# Patient Record
Sex: MALE | Race: ASIAN | NOT HISPANIC OR LATINO | Employment: OTHER | URBAN - METROPOLITAN AREA
[De-identification: names, ages, dates, MRNs, and addresses within clinical notes are randomized per-mention and may not be internally consistent; named-entity substitution may affect disease eponyms.]

---

## 2017-01-05 ENCOUNTER — TELEPHONE (OUTPATIENT)
Dept: CARDIOLOGY | Facility: MEDICAL CENTER | Age: 67
End: 2017-01-05

## 2017-01-05 NOTE — TELEPHONE ENCOUNTER
----- Message from Malinda Lewis sent at 1/5/2017  8:42 AM PST -----  Regarding: patient needs samples of Eliquis  CR/Zoe      Patient only has 2 doses of Eliquis left. He can't get refill because he doesn't have his new insurance card yet. He wants to know if he can  samples. He can be reached at [phone]

## 2017-01-06 ENCOUNTER — OFFICE VISIT (OUTPATIENT)
Dept: URGENT CARE | Facility: PHYSICIAN GROUP | Age: 67
End: 2017-01-06
Payer: COMMERCIAL

## 2017-01-06 VITALS
TEMPERATURE: 98.9 F | RESPIRATION RATE: 16 BRPM | HEIGHT: 71 IN | HEART RATE: 74 BPM | SYSTOLIC BLOOD PRESSURE: 130 MMHG | BODY MASS INDEX: 30.1 KG/M2 | WEIGHT: 215 LBS | OXYGEN SATURATION: 93 % | DIASTOLIC BLOOD PRESSURE: 84 MMHG

## 2017-01-06 DIAGNOSIS — S93.401A SPRAIN OF RIGHT ANKLE, UNSPECIFIED LIGAMENT, INITIAL ENCOUNTER: ICD-10-CM

## 2017-01-06 DIAGNOSIS — M72.2 PLANTAR FASCIITIS OF RIGHT FOOT: Primary | ICD-10-CM

## 2017-01-06 PROCEDURE — 99214 OFFICE O/P EST MOD 30 MIN: CPT | Performed by: PHYSICIAN ASSISTANT

## 2017-01-06 RX ORDER — METHYLPREDNISOLONE 4 MG/1
4 TABLET ORAL DAILY
Qty: 1 KIT | Refills: 0 | Status: SHIPPED | OUTPATIENT
Start: 2017-01-06 | End: 2019-03-16

## 2017-01-06 ASSESSMENT — ENCOUNTER SYMPTOMS
GASTROINTESTINAL NEGATIVE: 1
MYALGIAS: 1
CARDIOVASCULAR NEGATIVE: 1
EYES NEGATIVE: 1
CONSTITUTIONAL NEGATIVE: 1
RESPIRATORY NEGATIVE: 1
TINGLING: 0
DIZZINESS: 0
FALLS: 0
PSYCHIATRIC NEGATIVE: 1
BRUISES/BLEEDS EASILY: 0

## 2017-01-06 NOTE — MR AVS SNAPSHOT
"        Kip Harper   2017 10:45 AM   Office Visit   MRN: [id number]    Department:  [facility]   Dept Phone:  [phone]    Description:  Male : 1950   Provider:  Harpal Cintron PA-C           Reason for Visit     Ankle Injury stepped wrong, R ankle pain x3 days      Allergies as of 2017     Allergen Noted Reactions    No Known Drug Allergy 2012         You were diagnosed with     Plantar fasciitis of right foot   [535563]  -  Primary     Sprain of right ankle, unspecified ligament, initial encounter   [[id number]]         Vital Signs     Blood Pressure Pulse Temperature Respirations Height Weight    130/84 mmHg 74 37.2 °C (98.9 °F) 16 1.803 m (5' 10.98\") 97.523 kg (215 lb)    Body Mass Index Oxygen Saturation Smoking Status             30.00 kg/m2 93% Never Smoker          Basic Information     Date Of Birth Sex Race Ethnicity Preferred Language    1950 Male  or   Origin (Romansh,Sao Tomean,Cypriot,V[place]an, etc) English      Problem List              ICD-10-CM Priority Class Noted - Resolved    Hyperlipidemia E78.5 High  Unknown - Present    Hypertensive heart disease  High  Unknown - Present    Obesity E66.9   Unknown - Present    Paroxysmal atrial fibrillation (HCC) I48.0 High  Unknown - Present    Sleep apnea G47.30   Unknown - Present    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 R76.11   Unknown - Present    GOUT  High  3/15/2012 - Present    Vision loss of right eye H54.61   2014 - Present    Atrial fibrillation (HCC) I48.91   2014 - Present    Essential hypertension, benign I10 Medium  2014 - Present    Mitral regurgitation I34.0 Medium  2014 - Present    Chronic anticoagulation Z79.01   2014 - Present    Gout M10.9   2014 - Present    Melena K92.1   2016 - Present    Syncope R55 High  2016 - Present    Anemia D64.9 High  8/10/2016 - Present      Health Maintenance        Date Due Completion Dates    IMM DTaP/Tdap/Td " Vaccine (1 - Tdap) 4/7/1968 ---    COLONOSCOPY 4/7/1999 ---    IMM ZOSTER VACCINE 4/7/2009 ---    IMM PNEUMOCOCCAL 65+ (ADULT) LOW/MEDIUM RISK SERIES (1 of 2 - PCV13) 4/7/2014 ---    IMM INFLUENZA (1) 9/1/2016 ---            Current Immunizations     No immunizations on file.      Below and/or attached are the medications your provider expects you to take. Review all of your home medications and newly ordered medications with your provider and/or pharmacist. Follow medication instructions as directed by your provider and/or pharmacist. Please keep your medication list with you and share with your provider. Update the information when medications are discontinued, doses are changed, or new medications (including over-the-counter products) are added; and carry medication information at all times in the event of emergency situations     Allergies:  NO KNOWN DRUG ALLERGY - (reactions not documented)               Medications  Valid as of: January 06, 2017 - 11:41 AM    Generic Name Brand Name Tablet Size Instructions for use    Apixaban (Tab) ELIQUIS 5mg Take 1 Tab by mouth 2 Times a Day.        Colchicine (Tab) COLCRYS 0.6 MG TAKE 2 TABS AT SIGNS OF GOUT ATTACK, THEN 1 TAB ONE HOUR AFTER FIRST DOSE. DO NOT REPEAT EARLIER THAN 3 DAYS.        Hydrocodone-Acetaminophen (Tab) NORCO 5-325 MG 1 TAB EVERY 6 HOURS ONLY IF NEEDED FOR PAIN. MAY CAUSE DROWSINESS.        MethylPREDNISolone (Tablet Therapy Pack) MEDROL DOSEPAK 4 MG TAKE AS DIRECTED ON PACKAGE.        MethylPREDNISolone (Tablet Therapy Pack) MEDROL DOSEPAK 4 MG Take 1 Tab by mouth every day.        Metoprolol Tartrate (Tab) LOPRESSOR 25 MG Take 1 Tab by mouth 2 Times a Day.        Pantoprazole Sodium (Tablet Delayed Response) PROTONIX 40 MG Take 1 Tab by mouth 2 times a day.        Potassium Chloride Oksana CR (Tab CR) K-DUR 20 MEQ Take 2 Tabs by mouth every day.        Pravastatin Sodium (Tab) PRAVACHOL 40 MG Take 40 mg by mouth every evening.        Pravastatin  Sodium (Tab) PRAVACHOL 40 MG TAKE 1 TABLET BY MOUTH IN THE EVENING        Verapamil HCl (Tab CR) CALAN- MG Take 180 mg by mouth every evening.        .                 Medicines prescribed today were sent to:     [id number] - [place], [place] - [address]    [address] [place] [address]    Phone: [phone] Fax: [phone]    Open 24 Hours?: No      Medication refill instructions:       If your prescription bottle indicates you have medication refills left, it is not necessary to call your provider’s office. Please contact your pharmacy and they will refill your medication.    If your prescription bottle indicates you do not have any refills left, you may request refills at any time through one of the following ways: The online [employer] system (except Urgent Care), by calling your provider’s office, or by asking your pharmacy to contact your provider’s office with a refill request. Medication refills are processed only during regular business hours and may not be available until the next business day. Your provider may request additional information or to have a follow-up visit with you prior to refilling your medication.   *Please Note: Medication refills are assigned a new Rx number when refilled electronically. Your pharmacy may indicate that no refills were authorized even though a new prescription for the same medication is available at the pharmacy. Please request the medicine by name with the pharmacy before contacting your provider for a refill.        Your To Do List     Future Labs/Procedures Complete By Expires    DX-ANKLE 3+ VIEWS RIGHT  As directed 1/12/2018      Referral     A referral request has been sent to our patient care coordination department. Please allow 3-5 business days for us to process this request and contact you either by phone or mail. If you do not hear from us by the 5th business day, please call us at [phone].        Instructions    Medrol  dosepak.  Keep foot wrapped.  Work on stretches and exercises.  Icing.  Elevated above the heart.  Physical Therapy to help aid recovery.    Plantar Fasciitis  Plantar fasciitis is a painful foot condition that affects the heel. It occurs when the band of tissue that connects the toes to the heel bone (plantar fascia) becomes irritated. This can happen after exercising too much or doing other repetitive activities (overuse injury). The pain from plantar fasciitis can range from mild irritation to severe pain that makes it difficult for you to walk or move. The pain is usually worse in the morning or after you have been sitting or lying down for a while.  CAUSES  This condition may be caused by:  · Standing for long periods of time.  · Wearing shoes that do not fit.  · Doing high-impact activities, including running, aerobics, and ballet.  · Being overweight.  · Having an abnormal way of walking (gait).  · Having tight calf muscles.  · Having high arches in your feet.  · Starting a new athletic activity.  SYMPTOMS  The main symptom of this condition is heel pain. Other symptoms include:  · Pain that gets worse after activity or exercise.  · Pain that is worse in the morning or after resting.  · Pain that goes away after you walk for a few minutes.  DIAGNOSIS  This condition may be diagnosed based on your signs and symptoms. Your health care provider will also do a physical exam to check for:  · A tender area on the bottom of your foot.  · A high arch in your foot.  · Pain when you move your foot.  · Difficulty moving your foot.  You may also need to have imaging studies to confirm the diagnosis. These can include:  · X-rays.  · Ultrasound.  · MRI.  TREATMENT   Treatment for plantar fasciitis depends on the severity of the condition. Your treatment may include:  · Rest, ice, and over-the-counter pain medicines to manage your pain.  · Exercises to stretch your calves and your plantar fascia.  · A splint that holds your  foot in a stretched, upward position while you sleep (night splint).  · Physical therapy to relieve symptoms and prevent problems in the future.  · Cortisone injections to relieve severe pain.  · Extracorporeal shockwave therapy (ESWT) to stimulate damaged plantar fascia with electrical impulses. It is often used as a last resort before surgery.  · Surgery, if other treatments have not worked after 12 months.  HOME CARE INSTRUCTIONS  · Take medicines only as directed by your health care provider.  · Avoid activities that cause pain.  · Roll the bottom of your foot over a bag of ice or a bottle of cold water. Do this for 20 minutes, 3-4 times a day.  · Perform simple stretches as directed by your health care provider.  · Try wearing athletic shoes with air-sole or gel-sole cushions or soft shoe inserts.  · Wear a night splint while sleeping, if directed by your health care provider.  · Keep all follow-up appointments with your health care provider.  PREVENTION   · Do not perform exercises or activities that cause heel pain.  · Consider finding low-impact activities if you continue to have problems.  · Lose weight if you need to.  The best way to prevent plantar fasciitis is to avoid the activities that aggravate your plantar fascia.  SEEK MEDICAL CARE IF:  · Your symptoms do not go away after treatment with home care measures.  · Your pain gets worse.  · Your pain affects your ability to move or do your daily activities.     This information is not intended to replace advice given to you by your health care provider. Make sure you discuss any questions you have with your health care provider.     Document Released: 09/12/2002 Document Revised: 05/03/2016 Document Reviewed: 10/28/2015  [employer] Interactive Patient Education ©2016 Elsevier Inc.            HauteDay Access Code: [email or web address]  Expires: 2/5/2017 11:28 AM    [employer]  A secure, online tool to manage your health information     Monitor My Medss [employer] is  a secure, online tool that connects you to your personalized health information from the privacy of your home -- day or night - making it very easy for you to manage your healthcare. Once the activation process is completed, you can even access your medical information using the [employer] Education cr, which is available for free in the Apple Cr store or Google Play store.     Or[employer] Education provides the following levels of access (as shown below):   My Chart Features   [facility] Doctor [facility]  Specialists [facility]  Urgent  Care Non-[facility]  Primary Care  Doctor   Email your healthcare team securely and privately 24/7 X X X    Manage appointments: schedule your next appointment; view details of past/upcoming appointments X      Request prescription refills. X      View recent personal medical records, including lab and immunizations X X X X   View health record, including health history, allergies, medications X X X X   Read reports about your outpatient visits, procedures, consult and ER notes X X X X   See your discharge summary, which is a recap of your hospital and/or ER visit that includes your diagnosis, lab results, and care plan. X X       How to register for [employer] Education:  1. Go to  [email or web address].  2. Click on the Sign Up Now box, which takes you to the New Member Sign Up page. You will need to provide the following information:  a. Enter your Or[employer] Education Access Code exactly as it appears at the top of this page. (You will not need to use this code after you’ve completed the sign-up process. If you do not sign up before the expiration date, you must request a new code.)   b. Enter your date of birth.   c. Enter your home email address.   d. Click Submit, and follow the next screen’s instructions.  3. Create a General Fusiont ID. This will be your Orbis Education login ID and cannot be changed, so think of one that is secure and easy to remember.  4. Create a Or[employer] Education password. You can change your password at any time.  5. Enter  your Password Reset Question and Answer. This can be used at a later time if you forget your password.   6. Enter your e-mail address. This allows you to receive e-mail notifications when new information is available in [employer].  7. Click Sign Up. You can now view your health information.    For assistance activating your [employer] account, call [phone]

## 2017-01-06 NOTE — PROGRESS NOTES
Subjective:      Kip Harper is a 66 y.o. male who presents with Ankle Injury            Ankle Injury   Pertinent negatives include no tingling.     Chief Complaint   Patient presents with   • Ankle Injury     stepped wrong, R ankle pain x3 days       HPI:  Kip Harper is a 66 y.o.male who presents with right ankle pain x 3 days.  No falls.  Patient believes he stepped wrong and inverted the ankle.  Pain on hte inside of the plantar region as well ×1 month.  Went last month to ortho and was dx with plantar fascitis.  X-ray showed spur and tendonitis.  No numbness or paresthesias. Patient has been doing exercises that were demonstrated to him upon wakening, plantar flexion and extension. He did try icing last night. Was unable to tolerate ibuprofen. Did receive a Medrol Dosepak a month ago which did seem to help with symptoms. He does have history of gout involving the toes, mostly left foot. Patient denies HA, SOB, chest pain, palpitations, fever, chills, or n/v/d.      Past Medical History   Diagnosis Date   • Hyperlipidemia    • Hypertensive heart disease    • Hypertensive heart disease    • Obesity    • Paroxysmal atrial fibrillation    • Sleep apnea    • 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        Past Surgical History   Procedure Laterality Date   • Gastroscopy-endo  8/11/2016     Procedure: GASTROSCOPY-ENDO;  Surgeon: Jones Moyer M.D.;  Location: [facility];  Service:        Family History   Problem Relation Age of Onset   • Heart Disease Mother    • Diabetes Mother    • Heart Disease Father    • Lung Disease Father        Social History     Social History   • Marital Status:      Spouse Name: N/A   • Number of Children: N/A   • Years of Education: N/A     Occupational History   • Not on file.     Social History Main Topics   • Smoking status: Never Smoker    • Smokeless tobacco: Never Used   • Alcohol Use: No   • Drug Use: No   • Sexual Activity: Not on file     Other Topics  "Concern   • Not on file     Social History Narrative         Current outpatient prescriptions:   •  apixaban, 5 mg, Oral, BID, 1/6/2017  •  metoprolol, 25 mg, Oral, BID, 1/6/2017  •  pravastatin, 40 mg, Oral, Nightly, 1/6/2017 at pm  •  verapamil ER, 180 mg, Oral, Q EVENING, 1/6/2017 at pm  •  pravastatin, TAKE 1 TABLET BY MOUTH IN THE EVENING  •  MethylPREDNISolone, TAKE AS DIRECTED ON PACKAGE.  •  colchicine, TAKE 2 TABS AT SIGNS OF GOUT ATTACK, THEN 1 TAB ONE HOUR AFTER FIRST DOSE. DO NOT REPEAT EARLIER THAN 3 DAYS.  •  hydrocodone-acetaminophen, 1 TAB EVERY 6 HOURS ONLY IF NEEDED FOR PAIN. MAY CAUSE DROWSINESS.  •  potassium chloride SA, 40 mEq, Oral, DAILY  •  pantoprazole, 40 mg, Oral, BID, 10/9/2016    Allergies   Allergen Reactions   • No Known Drug Allergy              Review of Systems   Constitutional: Negative.    HENT: Negative.    Eyes: Negative.    Respiratory: Negative.    Cardiovascular: Negative.    Gastrointestinal: Negative.    Genitourinary: Negative.    Musculoskeletal: Positive for myalgias and joint pain. Negative for falls.   Skin: Negative.    Neurological: Negative for dizziness and tingling.   Endo/Heme/Allergies: Does not bruise/bleed easily.   Psychiatric/Behavioral: Negative.           Objective:     /84 mmHg  Pulse 74  Temp(Src) 37.2 °C (98.9 °F)  Resp 16  Ht 1.803 m (5' 10.98\")  Wt 97.523 kg (215 lb)  BMI 30.00 kg/m2  SpO2 93%     Physical Exam       Nursing note and vitals reviewed.    Constitutional:  Appropriately groomed, pleasant affect, well nourished, and in no acute distress.    HEENT:  Head: Atraumatic, normocephalic.    Ears:  Hearing grossly intact to voice.    Eyes:  Conjunctivae clear, sclera white, and medial canthus without exudate bilaterally.    Lungs:  Lungs with normal respiratory excursion and effort.      Right Ankle:  No edema, erythema, or ecchymosis present.  Ttp across the tarsals over the retinaculum.  Ttp over ATFL, deltoid ligaments, posterior " lat malleoulus. Point tenderness over the plantar fascia. No pain over the Achilles tendon.     FROM: Dorsiflexion, plantar flexion, inversion, eversion.      Motor Examination: Dorsi/plantar flexion 5/5 without atrophy.  Negative Washington's.      Negative Anterior/Posterior drawer.   Pain with resisted hallicus dorsiflexion.    Sensation equal bilaterally to light touch for L4, L5, and S1.      DTR 2+ for Achilles and patella bilaterally.  NVS intact, DP 2+ bilaterally.  Gait and station wnl,  antalgic using cane for ambulation.    Derm:  No rashes or lesions with good turgor pressure.     Psychiatric:  Normal judgement, mood and affect.           Assessment/Plan:     1. Plantar fasciitis of right foot  Patient presents with right plantar fasciitis diagnosed by a orthopedic clinic last month. On exam patient has point tenderness over the plantar fascia. Did discuss x-ray for further evaluation. Patient states he had an x-ray done at the other facility which did show some tendinitis and spurring. Given this, suspect calcific tendinitis. Placed patient in an Ace bandage and recommended icing and range of motion exercises. Did Rx Medrol Dosepak for steroid trial this patient does have a history of gout. Low suspicion for gout at this time, but do expect some improvement with steroidal anti-inflammatories. Did refer patient to physical therapy for further evaluation and treatment.    - REFERRAL TO PHYSICAL THERAPY Reason for Therapy: Eval/Treat/Report  - DX-ANKLE 3+ VIEWS RIGHT; Future  - MethylPREDNISolone (MEDROL DOSEPAK) 4 MG Tablet Therapy Pack; Take 1 Tab by mouth every day.  Dispense: 1 Kit; Refill: 0    2. Sprain of right ankle, unspecified ligament, initial encounter   Patient was suspected sprain to right ankle with some ATFL tenderness to palpation. Patient does have moderate posterior fibular tenderness to palpation and given this concern for ankle fracture. Did discuss x-ray with patient. X-ray capabilities  aren't available to site and did provide patient with outpatient order to be done at [place] today. Recommended rice protocol. Patient using a cane for ambulation and did recommend continuing with this to help with managing pain and ankle instability.    Patient was in agreement with this treatment plan and seemed to understand without barriers. All questions were encouraged and answered.  Reviewed signs and symptoms of when to seek emergency medical care.     Please note that this dictation was created using voice recognition software.  I have made every reasonable attempt to correct obvious errors, but I expect there are errors of sosa and possibly content that I did not discover before finalizing the note.     - REFERRAL TO PHYSICAL THERAPY Reason for Therapy: Eval/Treat/Report  - DX-ANKLE 3+ VIEWS RIGHT; Future  - MethylPREDNISolone (MEDROL DOSEPAK) 4 MG Tablet Therapy Pack; Take 1 Tab by mouth every day.  Dispense: 1 Kit; Refill: 0

## 2017-01-06 NOTE — PATIENT INSTRUCTIONS
Medrol dosepak.  Keep foot wrapped.  Work on stretches and exercises.  Icing.  Elevated above the heart.  Physical Therapy to help aid recovery.    Plantar Fasciitis  Plantar fasciitis is a painful foot condition that affects the heel. It occurs when the band of tissue that connects the toes to the heel bone (plantar fascia) becomes irritated. This can happen after exercising too much or doing other repetitive activities (overuse injury). The pain from plantar fasciitis can range from mild irritation to severe pain that makes it difficult for you to walk or move. The pain is usually worse in the morning or after you have been sitting or lying down for a while.  CAUSES  This condition may be caused by:  · Standing for long periods of time.  · Wearing shoes that do not fit.  · Doing high-impact activities, including running, aerobics, and ballet.  · Being overweight.  · Having an abnormal way of walking (gait).  · Having tight calf muscles.  · Having high arches in your feet.  · Starting a new athletic activity.  SYMPTOMS  The main symptom of this condition is heel pain. Other symptoms include:  · Pain that gets worse after activity or exercise.  · Pain that is worse in the morning or after resting.  · Pain that goes away after you walk for a few minutes.  DIAGNOSIS  This condition may be diagnosed based on your signs and symptoms. Your health care provider will also do a physical exam to check for:  · A tender area on the bottom of your foot.  · A high arch in your foot.  · Pain when you move your foot.  · Difficulty moving your foot.  You may also need to have imaging studies to confirm the diagnosis. These can include:  · X-rays.  · Ultrasound.  · MRI.  TREATMENT   Treatment for plantar fasciitis depends on the severity of the condition. Your treatment may include:  · Rest, ice, and over-the-counter pain medicines to manage your pain.  · Exercises to stretch your calves and your plantar fascia.  · A splint that  holds your foot in a stretched, upward position while you sleep (night splint).  · Physical therapy to relieve symptoms and prevent problems in the future.  · Cortisone injections to relieve severe pain.  · Extracorporeal shockwave therapy (ESWT) to stimulate damaged plantar fascia with electrical impulses. It is often used as a last resort before surgery.  · Surgery, if other treatments have not worked after 12 months.  HOME CARE INSTRUCTIONS  · Take medicines only as directed by your health care provider.  · Avoid activities that cause pain.  · Roll the bottom of your foot over a bag of ice or a bottle of cold water. Do this for 20 minutes, 3-4 times a day.  · Perform simple stretches as directed by your health care provider.  · Try wearing athletic shoes with air-sole or gel-sole cushions or soft shoe inserts.  · Wear a night splint while sleeping, if directed by your health care provider.  · Keep all follow-up appointments with your health care provider.  PREVENTION   · Do not perform exercises or activities that cause heel pain.  · Consider finding low-impact activities if you continue to have problems.  · Lose weight if you need to.  The best way to prevent plantar fasciitis is to avoid the activities that aggravate your plantar fascia.  SEEK MEDICAL CARE IF:  · Your symptoms do not go away after treatment with home care measures.  · Your pain gets worse.  · Your pain affects your ability to move or do your daily activities.     This information is not intended to replace advice given to you by your health care provider. Make sure you discuss any questions you have with your health care provider.     Document Released: 09/12/2002 Document Revised: 05/03/2016 Document Reviewed: 10/28/2015  [employer] Interactive Patient Education ©2016 [employer]

## 2017-01-11 ENCOUNTER — OFFICE VISIT (OUTPATIENT)
Dept: URGENT CARE | Facility: PHYSICIAN GROUP | Age: 67
End: 2017-01-11
Payer: COMMERCIAL

## 2017-01-11 ENCOUNTER — HOSPITAL ENCOUNTER (OUTPATIENT)
Dept: RADIOLOGY | Facility: MEDICAL CENTER | Age: 67
End: 2017-01-11
Attending: NURSE PRACTITIONER
Payer: COMMERCIAL

## 2017-01-11 VITALS
HEART RATE: 69 BPM | RESPIRATION RATE: 16 BRPM | WEIGHT: 228 LBS | DIASTOLIC BLOOD PRESSURE: 102 MMHG | TEMPERATURE: 98.5 F | SYSTOLIC BLOOD PRESSURE: 140 MMHG | OXYGEN SATURATION: 94 % | BODY MASS INDEX: 31.81 KG/M2

## 2017-01-11 DIAGNOSIS — M79.671 RIGHT FOOT PAIN: ICD-10-CM

## 2017-01-11 DIAGNOSIS — M25.571 ACUTE RIGHT ANKLE PAIN: ICD-10-CM

## 2017-01-11 PROCEDURE — 99214 OFFICE O/P EST MOD 30 MIN: CPT | Performed by: NURSE PRACTITIONER

## 2017-01-11 PROCEDURE — 73630 X-RAY EXAM OF FOOT: CPT | Mod: RT

## 2017-01-11 PROCEDURE — 73610 X-RAY EXAM OF ANKLE: CPT | Mod: RT

## 2017-01-11 RX ORDER — PREDNISONE 10 MG/1
TABLET ORAL
Qty: 15 TAB | Refills: 1 | Status: SHIPPED | OUTPATIENT
Start: 2017-01-11 | End: 2019-03-16

## 2017-01-11 ASSESSMENT — ENCOUNTER SYMPTOMS
FEVER: 0
CHILLS: 0

## 2017-01-11 NOTE — PROGRESS NOTES
Subjective:      Kip Harper is a 66 y.o. 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     Social History Main Topics   • Smoking status: Never Smoker    • Smokeless tobacco: Never Used   • Alcohol Use: No   • Drug Use: No   • Sexual Activity: Not on file     Other Topics Concern   • Not on file     Social History Narrative     Family History   Problem Relation Age of Onset   • Heart Disease Mother    • Diabetes Mother    • Heart Disease Father    • Lung Disease Father      Allergies: No known drug allergy    Current Outpatient Prescriptions on File Prior to Visit   Medication Sig Dispense Refill   • apixaban (ELIQUIS) 5mg Tab Take 1 Tab by mouth 2 Times a Day. 70 Tab 0   • metoprolol (LOPRESSOR) 25 MG Tab Take 1 Tab by mouth 2 Times a Day. 60 Tab 11   • pantoprazole (PROTONIX) 40 MG Tablet Delayed Response Take 1 Tab by mouth 2 times a day. 60 Tab 6   • pravastatin (PRAVACHOL) 40 MG tablet Take 40 mg by mouth every evening.     • verapamil ER (CALAN-SR) 180 MG Tab CR Take 180 mg by mouth every evening.     • MethylPREDNISolone (MEDROL DOSEPAK) 4 MG Tablet Therapy Pack Take 1 Tab by mouth every day. 1 Kit 0   • pravastatin (PRAVACHOL) 40 MG tablet TAKE 1 TABLET BY MOUTH IN THE EVENING 90 Tab 3   • MethylPREDNISolone (MEDROL DOSEPAK) 4 MG Tablet Therapy Pack TAKE AS DIRECTED ON PACKAGE. 21 Tab 0   • colchicine (COLCRYS) 0.6 MG Tab TAKE 2 TABS AT SIGNS OF GOUT ATTACK, THEN 1 TAB ONE HOUR AFTER FIRST DOSE. DO NOT REPEAT EARLIER THAN 3 DAYS. 30 Tab 0   • hydrocodone-acetaminophen (NORCO) 5-325 MG Tab per tablet 1 TAB  EVERY 6 HOURS ONLY IF NEEDED FOR PAIN. MAY CAUSE DROWSINESS. 28 Tab 0   • potassium chloride SA (K-DUR) 20 MEQ Tab CR Take 2 Tabs by mouth every day. 60 Tab 1     No current facility-administered medications on file prior to visit.     Past Surgical History   Procedure Laterality Date   • Gastroscopy-endo  8/11/2016     Procedure: GASTROSCOPY-ENDO;  Surgeon: Jones Moyer M.D.;  Location: ENDOSCOPY [facility];  Service:      Patient presents today for continued pain to the right foot and ankle. He was seen 5 days ago here in urgent care. He declined the x-rays that were ordered at that time. At that visit, patient stated that he believed he sprained his ankle. He also has a history of gout and does not take medication for chronic suppression. He received a prescription for a Medrol Dosepak at his last visit. He states that this was very helpful. Now that the pack is almost finished and he is taking lordosis, he reports pain has returned. He is on Eliquis and is not able to take NSAIDs.          Foot Problem  This is a new problem. The current episode started 1 to 4 weeks ago. The problem occurs constantly. The problem has been waxing and waning. Pertinent negatives include no chills or fever. Associated symptoms comments: Pain to the posterior ankle and instep of the foot . Nothing aggravates the symptoms. He has tried rest (steroids) for the symptoms. The treatment provided moderate relief.       Review of Systems   Constitutional: Negative for fever and chills.   Musculoskeletal:        Pain to the right foot and ankle.        All other systems reviewed and are negative        Objective:     /102 mmHg  Pulse 69  Temp(Src) 36.9 °C (98.5 °F)  Resp 16  Wt 103.42 kg (228 lb)  SpO2 94%     Physical Exam   Constitutional: He is oriented to person, place, and time. He appears well-developed and well-nourished. No distress.   Musculoskeletal:        Right foot: There is tenderness.         Feet:    Patient reports resolving pain with ambulation.   Neurological: He is alert and oriented to person, place, and time.   Skin: Skin is warm and dry. He is not diaphoretic.   Psychiatric: He has a normal mood and affect. His behavior is normal.   Vitals reviewed.    1/11/2017 2:33 PM    HISTORY/REASON FOR EXAM:  Atraumatic Pain/Swelling/Deformity  RIGHT foot arch pain after misstep 2 weeks ago    TECHNIQUE/EXAM DESCRIPTION AND NUMBER OF VIEWS:  3 views of the RIGHT foot.    COMPARISON:  None.    FINDINGS:  No focal soft tissue swelling.  Calcaneal enthesophytes present.  No fracture or dislocation.  No focal bony destruction.         Impression        Unremarkable RIGHT foot.     /11/2017 2:33 PM    HISTORY/REASON FOR EXAM:  Pain/Deformity Following Trauma.  Posterior RIGHT ankle pain after misstep 2 weeks ago.    TECHNIQUE/EXAM DESCRIPTION AND NUMBER OF VIEWS:  3 views of the RIGHT ankle.    COMPARISON: RIGHT foot 1/11/2017    FINDINGS:  No focal soft tissue swelling.  Mortise is congruent.  No fracture or dislocation.  Calcaneal enthesophytes present.         Impression        No fracture or dislocation of RIGHT ankle.               Assessment/Plan:     1. Right foot pain    - DX-FOOT-COMPLETE 3+ RIGHT; Future  - predniSONE (DELTASONE) 10 MG Tab; Take 1 tablet 3 times daily for 5 days  Dispense: 15 Tab; Refill: 1  -uric acid level; will call  -recommended also follow up with PCP next week; patient states he will schedule that appointment.    2. Acute right ankle pain    - predniSONE (DELTASONE) 10 MG Tab; Take 1 tablet 3 times daily for 5 days  Dispense: 15 Tab; Refill: 1  - DX-ANKLE 3+ VIEWS RIGHT; Future

## 2017-01-13 ENCOUNTER — TELEPHONE (OUTPATIENT)
Dept: URGENT CARE | Facility: PHYSICIAN GROUP | Age: 67
End: 2017-01-13

## 2017-01-13 LAB — URATE SERPL-MCNC: 9 MG/DL (ref 3.7–8.6)

## 2017-01-13 NOTE — TELEPHONE ENCOUNTER
Patient notified by phone of his uric acid level results. Results was 9.0, indicative of exacerbation of gout. Patient has been on prednisone 10 mg 3 times daily or total of 5 days. He reports significant relief with this. He is on Fernandez requests and is unable to take NSAIDs. He does have a prescription for colchicine 0.6 mg. I have advised him to start taking that once daily and also to follow up with his primary provider next week. Patient verbalized understanding and agreement with plan of care.

## 2017-02-01 ENCOUNTER — HOSPITAL ENCOUNTER (OUTPATIENT)
Dept: PHYSICAL THERAPY | Facility: REHABILITATION | Age: 67
End: 2017-02-01
Attending: PHYSICIAN ASSISTANT
Payer: COMMERCIAL

## 2017-02-01 PROCEDURE — G8979 MOBILITY GOAL STATUS: HCPCS | Mod: CH

## 2017-02-01 PROCEDURE — G8978 MOBILITY CURRENT STATUS: HCPCS | Mod: CH

## 2017-02-01 PROCEDURE — G8980 MOBILITY D/C STATUS: HCPCS | Mod: CH

## 2017-02-01 PROCEDURE — 97110 THERAPEUTIC EXERCISES: CPT

## 2017-02-01 PROCEDURE — 97161 PT EVAL LOW COMPLEX 20 MIN: CPT

## 2017-02-06 ENCOUNTER — APPOINTMENT (OUTPATIENT)
Dept: PHYSICAL THERAPY | Facility: REHABILITATION | Age: 67
End: 2017-02-06
Attending: PHYSICIAN ASSISTANT
Payer: COMMERCIAL

## 2017-02-08 ENCOUNTER — APPOINTMENT (OUTPATIENT)
Dept: PHYSICAL THERAPY | Facility: REHABILITATION | Age: 67
End: 2017-02-08
Attending: PHYSICIAN ASSISTANT
Payer: COMMERCIAL

## 2017-02-13 ENCOUNTER — APPOINTMENT (OUTPATIENT)
Dept: PHYSICAL THERAPY | Facility: REHABILITATION | Age: 67
End: 2017-02-13
Attending: PHYSICIAN ASSISTANT
Payer: COMMERCIAL

## 2017-02-15 ENCOUNTER — APPOINTMENT (OUTPATIENT)
Dept: PHYSICAL THERAPY | Facility: REHABILITATION | Age: 67
End: 2017-02-15
Attending: PHYSICIAN ASSISTANT
Payer: COMMERCIAL

## 2017-02-20 ENCOUNTER — APPOINTMENT (OUTPATIENT)
Dept: PHYSICAL THERAPY | Facility: REHABILITATION | Age: 67
End: 2017-02-20
Attending: PHYSICIAN ASSISTANT
Payer: COMMERCIAL

## 2017-02-22 ENCOUNTER — APPOINTMENT (OUTPATIENT)
Dept: PHYSICAL THERAPY | Facility: REHABILITATION | Age: 67
End: 2017-02-22
Attending: PHYSICIAN ASSISTANT
Payer: COMMERCIAL

## 2017-03-01 ENCOUNTER — APPOINTMENT (OUTPATIENT)
Dept: PHYSICAL THERAPY | Facility: REHABILITATION | Age: 67
End: 2017-03-01
Attending: PHYSICIAN ASSISTANT
Payer: COMMERCIAL

## 2017-04-24 ENCOUNTER — OFFICE VISIT (OUTPATIENT)
Dept: URGENT CARE | Facility: PHYSICIAN GROUP | Age: 67
End: 2017-04-24
Payer: COMMERCIAL

## 2017-04-24 VITALS
HEART RATE: 87 BPM | SYSTOLIC BLOOD PRESSURE: 138 MMHG | TEMPERATURE: 98.6 F | DIASTOLIC BLOOD PRESSURE: 92 MMHG | BODY MASS INDEX: 32.37 KG/M2 | OXYGEN SATURATION: 97 % | RESPIRATION RATE: 16 BRPM | WEIGHT: 232 LBS

## 2017-04-24 DIAGNOSIS — M10.9 ACUTE GOUT OF LEFT ANKLE, UNSPECIFIED CAUSE: ICD-10-CM

## 2017-04-24 PROCEDURE — 1101F PT FALLS ASSESS-DOCD LE1/YR: CPT | Mod: 8P | Performed by: FAMILY MEDICINE

## 2017-04-24 PROCEDURE — G8432 DEP SCR NOT DOC, RNG: HCPCS | Performed by: FAMILY MEDICINE

## 2017-04-24 PROCEDURE — 4040F PNEUMOC VAC/ADMIN/RCVD: CPT | Mod: 8P | Performed by: FAMILY MEDICINE

## 2017-04-24 PROCEDURE — 1036F TOBACCO NON-USER: CPT | Performed by: FAMILY MEDICINE

## 2017-04-24 PROCEDURE — 99214 OFFICE O/P EST MOD 30 MIN: CPT | Performed by: FAMILY MEDICINE

## 2017-04-24 PROCEDURE — 3017F COLORECTAL CA SCREEN DOC REV: CPT | Mod: 8P | Performed by: FAMILY MEDICINE

## 2017-04-24 PROCEDURE — G8419 CALC BMI OUT NRM PARAM NOF/U: HCPCS | Performed by: FAMILY MEDICINE

## 2017-04-24 RX ORDER — PREDNISONE 20 MG/1
40 TABLET ORAL DAILY
Qty: 10 TAB | Refills: 0 | Status: SHIPPED | OUTPATIENT
Start: 2017-04-24 | End: 2017-04-29

## 2017-04-24 RX ORDER — OXYCODONE HYDROCHLORIDE AND ACETAMINOPHEN 5; 325 MG/1; MG/1
1-2 TABLET ORAL EVERY 6 HOURS PRN
Qty: 15 TAB | Refills: 0 | Status: SHIPPED | OUTPATIENT
Start: 2017-04-24 | End: 2019-03-16

## 2017-04-24 ASSESSMENT — ENCOUNTER SYMPTOMS
WEIGHT LOSS: 0
SENSORY CHANGE: 0
FOCAL WEAKNESS: 0

## 2017-04-24 NOTE — PROGRESS NOTES
"Subjective:      Kip Harper is a 67 y.o. male who presents with Ankle Pain            Ankle Pain     2 days left ankle pain, swelling, and warmth. \"I have gout\". No trauma. No calf swelling. No fever. Pain is severe. Worse with movement. No OTC medications. Has responded to prednisone in past. No response to colchicine. NSAID contraindicated due to Eliquis.     Review of Systems   Constitutional: Negative for weight loss and malaise/fatigue.   Musculoskeletal:        No other joints affected   Skin: Negative for itching and rash.   Neurological: Negative for sensory change and focal weakness.       .  Medications, Allergies, and current problem list reviewed today in Epic     Objective:     /92 mmHg  Pulse 87  Temp(Src) 37 °C (98.6 °F)  Resp 16  Wt 105.235 kg (232 lb)  SpO2 97%     Physical Exam   Constitutional: He appears well-developed and well-nourished. No distress.   HENT:   Head: Normocephalic and atraumatic.   Musculoskeletal:   Left ankle: tender and warmth diffusely, stable. Pain reproduced with movement in all planes.      Neurological:   Speech is clear. Patient is appropriate and cooperative.     Skin: Skin is warm and dry.               Assessment/Plan:     1. Acute gout of left ankle, unspecified cause  predniSONE (DELTASONE) 20 MG Tab    oxycodone-acetaminophen (PERCOCET) 5-325 MG Tab     Differential diagnosis, natural history, supportive care, and indications for immediate follow-up discussed at length.   risk vs benefit of medication(s) discussed at length    "

## 2017-04-24 NOTE — MR AVS SNAPSHOT
Kip Harper   2017 8:30 AM   Office Visit   MRN: [id number]    Department:  [facility]   Dept Phone:  [phone]    Description:  Male : 1950   Provider:  Solomon Ramirez M.D.           Reason for Visit     Ankle Pain left ankle swollen, possible gout, x 1-2 days      Allergies as of 2017     Allergen Noted Reactions    No Known Drug Allergy 2012         You were diagnosed with     Acute gout of left ankle, unspecified cause   [[id number]]         Vital Signs     Blood Pressure Pulse Temperature Respirations Weight Oxygen Saturation    138/92 mmHg 87 37 °C (98.6 °F) 16 105.235 kg (232 lb) 97%    Smoking Status                   Never Smoker            Basic Information     Date Of Birth Sex Race Ethnicity Preferred Language    1950 Male  or   Origin (Mongolian,Welsh,Indian,Rufus, etc) English      Your appointments     2017 11:00 AM   FOLLOW UP with Ct Hess M.D.   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 R76.11   Unknown - Present    GOUT  High  3/15/2012 - Present    Vision loss of right eye H54.61   2014 - Present    Atrial fibrillation (CMS-HCC) I48.91   2014 - Present    Essential hypertension, benign I10 Medium  2014 - Present    Mitral regurgitation I34.0 Medium  2014 - Present    Chronic anticoagulation Z79.01   2014 - Present    Gout M10.9   2014 - Present    Melena K92.1   2016 - Present    Syncope R55 High  2016 - Present    Anemia  D64.9 High  8/10/2016 - Present      Health Maintenance        Date Due Completion Dates    IMM DTaP/Tdap/Td Vaccine (1 - Tdap) 4/7/1968 ---    COLONOSCOPY 4/7/1999 ---    IMM ZOSTER VACCINE 4/7/2009 ---    IMM PNEUMOCOCCAL 65+ (ADULT) LOW/MEDIUM RISK SERIES (1 of 2 - PCV13) 4/7/2014 ---            Current Immunizations     No immunizations on file.      Below and/or attached are the medications your provider expects you to take. Review all of your home medications and newly ordered medications with your provider and/or pharmacist. Follow medication instructions as directed by your provider and/or pharmacist. Please keep your medication list with you and share with your provider. Update the information when medications are discontinued, doses are changed, or new medications (including over-the-counter products) are added; and carry medication information at all times in the event of emergency situations     Allergies:  NO KNOWN DRUG ALLERGY - (reactions not documented)               Medications  Valid as of: April 24, 2017 -  8:53 AM    Generic Name Brand Name Tablet Size Instructions for use    Apixaban (Tab) ELIQUIS 5mg Take 1 Tab by mouth 2 Times a Day.        Colchicine (Tab) COLCRYS 0.6 MG TAKE 2 TABS AT SIGNS OF GOUT ATTACK, THEN 1 TAB ONE HOUR AFTER FIRST DOSE. DO NOT REPEAT EARLIER THAN 3 DAYS.        Hydrocodone-Acetaminophen (Tab) NORCO 5-325 MG 1 TAB EVERY 6 HOURS ONLY IF NEEDED FOR PAIN. MAY CAUSE DROWSINESS.        MethylPREDNISolone (Tablet Therapy Pack) MEDROL DOSEPAK 4 MG TAKE AS DIRECTED ON PACKAGE.        MethylPREDNISolone (Tablet Therapy Pack) MEDROL DOSEPAK 4 MG Take 1 Tab by mouth every day.        Metoprolol Tartrate (Tab) LOPRESSOR 25 MG Take 1 Tab by mouth 2 Times a Day.        Oxycodone-Acetaminophen (Tab) PERCOCET 5-325 MG Take 1-2 Tabs by mouth every 6 hours as needed for Severe Pain.        Pantoprazole Sodium (Tablet Delayed Response) PROTONIX 40 MG Take 1 Tab by mouth 2 times a day.         Potassium Chloride Oksana CR (Tab CR) Kdur 20 MEQ Take 2 Tabs by mouth every day.        Pravastatin Sodium (Tab) PRAVACHOL 40 MG Take 40 mg by mouth every evening.        Pravastatin Sodium (Tab) PRAVACHOL 40 MG TAKE 1 TABLET BY MOUTH IN THE EVENING        PredniSONE (Tab) DELTASONE 10 MG Take 1 tablet 3 times daily for 5 days        PredniSONE (Tab) DELTASONE 20 MG Take 2 Tabs by mouth every day for 5 days.        Verapamil HCl (Tab CR) CALAN- MG Take 180 mg by mouth every evening.        .                 Medicines prescribed today were sent to:     [id number] - [place], [place] - [address] [place] [address]    [address] [place] [address]    Phone: [phone] Fax: [phone]    Open 24 Hours?: No      Medication refill instructions:       If your prescription bottle indicates you have medication refills left, it is not necessary to call your provider’s office. Please contact your pharmacy and they will refill your medication.    If your prescription bottle indicates you do not have any refills left, you may request refills at any time through one of the following ways: The online ScaleIO system (except Urgent Care), by calling your provider’s office, or by asking your pharmacy to contact your provider’s office with a refill request. Medication refills are processed only during regular business hours and may not be available until the next business day. Your provider may request additional information or to have a follow-up visit with you prior to refilling your medication.   *Please Note: Medication refills are assigned a new Rx number when refilled electronically. Your pharmacy may indicate that no refills were authorized even though a new prescription for the same medication is available at the pharmacy. Please request the medicine by name with the pharmacy before contacting your provider for a refill.           ScaleIO Access Code: [email or web address]  Expires: 5/16/2017 10:31 AM    [employer] SHAFFER secure,  online tool to manage your health information     [employer]’s Jeeran® is a secure, online tool that connects you to your personalized health information from the privacy of your home -- day or night - making it very easy for you to manage your healthcare. Once the activation process is completed, you can even access your medical information using the Jeeran cr, which is available for free in the Apple Cr store or Google Play store.     Jeeran provides the following levels of access (as shown below):   My Chart Features   [facility] Primary [facility] Doctor [facility]  Specialists [facility]  Urgent  Care Non-[facility]  Primary Care  Doctor   Email your healthcare team securely and privately 24/7 X X X    Manage appointments: schedule your next appointment; view details of past/upcoming appointments X      Request prescription refills. X      View recent personal medical records, including lab and immunizations X X X X   View health record, including health history, allergies, medications X X X X   Read reports about your outpatient visits, procedures, consult and ER notes X X X X   See your discharge summary, which is a recap of your hospital and/or ER visit that includes your diagnosis, lab results, and care plan. X X       How to register for Jeeran:  1. Go to  [email or web address].  2. Click on the Sign Up Now box, which takes you to the New Member Sign Up page. You will need to provide the following information:  a. Enter your Jeeran Access Code exactly as it appears at the top of this page. (You will not need to use this code after you’ve completed the sign-up process. If you do not sign up before the expiration date, you must request a new code.)   b. Enter your date of birth.   c. Enter your home email address.   d. Click Submit, and follow the next screen’s instructions.  3. Create a Jeeran ID. This will be your Jeeran login ID and cannot be changed, so think of one that is secure and easy to  remember.  4. Create a Asteel password. You can change your password at any time.  5. Enter your Password Reset Question and Answer. This can be used at a later time if you forget your password.   6. Enter your e-mail address. This allows you to receive e-mail notifications when new information is available in Asteel.  7. Click Sign Up. You can now view your health information.    For assistance activating your Asteel account, call [phone]

## 2017-04-28 ENCOUNTER — OFFICE VISIT (OUTPATIENT)
Dept: CARDIOLOGY | Facility: MEDICAL CENTER | Age: 67
End: 2017-04-28
Payer: COMMERCIAL

## 2017-04-28 VITALS
HEART RATE: 72 BPM | OXYGEN SATURATION: 94 % | WEIGHT: 232 LBS | HEIGHT: 71 IN | BODY MASS INDEX: 32.48 KG/M2 | SYSTOLIC BLOOD PRESSURE: 150 MMHG | DIASTOLIC BLOOD PRESSURE: 98 MMHG

## 2017-04-28 DIAGNOSIS — I10 ESSENTIAL HYPERTENSION, BENIGN: ICD-10-CM

## 2017-04-28 DIAGNOSIS — E66.01 MORBID OBESITY DUE TO EXCESS CALORIES (HCC): ICD-10-CM

## 2017-04-28 DIAGNOSIS — Z79.01 CHRONIC ANTICOAGULATION: ICD-10-CM

## 2017-04-28 DIAGNOSIS — E78.2 MIXED HYPERLIPIDEMIA: ICD-10-CM

## 2017-04-28 DIAGNOSIS — I48.0 PAROXYSMAL ATRIAL FIBRILLATION (HCC): ICD-10-CM

## 2017-04-28 PROCEDURE — 3017F COLORECTAL CA SCREEN DOC REV: CPT | Mod: 8P | Performed by: INTERNAL MEDICINE

## 2017-04-28 PROCEDURE — 99214 OFFICE O/P EST MOD 30 MIN: CPT | Performed by: INTERNAL MEDICINE

## 2017-04-28 PROCEDURE — 4040F PNEUMOC VAC/ADMIN/RCVD: CPT | Mod: 8P | Performed by: INTERNAL MEDICINE

## 2017-04-28 PROCEDURE — 1101F PT FALLS ASSESS-DOCD LE1/YR: CPT | Mod: 8P | Performed by: INTERNAL MEDICINE

## 2017-04-28 PROCEDURE — G8419 CALC BMI OUT NRM PARAM NOF/U: HCPCS | Performed by: INTERNAL MEDICINE

## 2017-04-28 PROCEDURE — G8432 DEP SCR NOT DOC, RNG: HCPCS | Performed by: INTERNAL MEDICINE

## 2017-04-28 PROCEDURE — 1036F TOBACCO NON-USER: CPT | Performed by: INTERNAL MEDICINE

## 2017-04-28 RX ORDER — METOPROLOL TARTRATE 50 MG/1
50 TABLET, FILM COATED ORAL 2 TIMES DAILY
Qty: 60 TAB | Refills: 11 | Status: SHIPPED | OUTPATIENT
Start: 2017-04-28 | End: 2018-05-08 | Stop reason: SDUPTHER

## 2017-04-28 ASSESSMENT — ENCOUNTER SYMPTOMS
CLAUDICATION: 0
SHORTNESS OF BREATH: 0
PND: 0
NAUSEA: 0
ABDOMINAL PAIN: 0
BLOOD IN STOOL: 0
MYALGIAS: 0
FOCAL WEAKNESS: 0
ORTHOPNEA: 0
PALPITATIONS: 0
VOMITING: 0
DIZZINESS: 0
HEMOPTYSIS: 0
WEAKNESS: 0
SPEECH CHANGE: 0

## 2017-04-28 NOTE — Clinical Note
[facility]   [address], [address]  [place] [address]  Phone: [phone]  Fax: [phone]              Kip Harper  1950    Encounter Date: 4/28/2017    Ct Hess M.D.          PROGRESS NOTE:  Subjective:   Kip Harper is a 67 y.o. male who presents today for f/u AF, HTN and anticoagulation    No cardiac complaints    Admitted last summer for GI bleed  Found to have ulcer. Was taking NSAIDs then.  No bleeding issue since.    BP was high during that admission. Metoprolol 25 mg BID was added.      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      Past Surgical History   Procedure Laterality Date   • Gastroscopy-endo  8/11/2016     Procedure: GASTROSCOPY-ENDO;  Surgeon: Jones Moyer M.D.;  Location: ENDOSCOPY [facility];  Service:      Family History   Problem Relation Age of Onset   • Heart Disease Mother    • Diabetes Mother    • Heart Disease Father    • Lung Disease Father      History   Smoking status   • Never Smoker    Smokeless tobacco   • Never Used     Allergies   Allergen Reactions   • No Known Drug Allergy      Outpatient Encounter Prescriptions as of 4/28/2017   Medication Sig Dispense Refill   • metoprolol (LOPRESSOR) 50 MG Tab Take 1 Tab by mouth 2 Times a Day. 60 Tab 11   • apixaban (ELIQUIS) 5mg Tab Take 1 Tab by mouth 2 Times a Day. 60 Tab 11   • pravastatin (PRAVACHOL) 40 MG tablet Take 40 mg by mouth every evening.     • verapamil ER (CALAN-SR) 180 MG Tab CR Take 180 mg by mouth every evening.     • predniSONE (DELTASONE) 20 MG Tab Take 2 Tabs by mouth every day for 5 days. 10 Tab 0   • oxycodone-acetaminophen (PERCOCET) 5-325 MG Tab Take 1-2 Tabs by mouth every 6 hours as needed for Severe Pain. 15 Tab 0   • predniSONE  "(DELTASONE) 10 MG Tab Take 1 tablet 3 times daily for 5 days 15 Tab 1   • MethylPREDNISolone (MEDROL DOSEPAK) 4 MG Tablet Therapy Pack Take 1 Tab by mouth every day. 1 Kit 0   • pravastatin (PRAVACHOL) 40 MG tablet TAKE 1 TABLET BY MOUTH IN THE EVENING 90 Tab 3   • [DISCONTINUED] apixaban (ELIQUIS) 5mg Tab Take 1 Tab by mouth 2 Times a Day. 70 Tab 0   • MethylPREDNISolone (MEDROL DOSEPAK) 4 MG Tablet Therapy Pack TAKE AS DIRECTED ON PACKAGE. 21 Tab 0   • colchicine (COLCRYS) 0.6 MG Tab TAKE 2 TABS AT SIGNS OF GOUT ATTACK, THEN 1 TAB ONE HOUR AFTER FIRST DOSE. DO NOT REPEAT EARLIER THAN 3 DAYS. 30 Tab 0   • hydrocodone-acetaminophen (NORCO) 5-325 MG Tab per tablet 1 TAB EVERY 6 HOURS ONLY IF NEEDED FOR PAIN. MAY CAUSE DROWSINESS. 28 Tab 0   • potassium chloride SA (K-DUR) 20 MEQ Tab CR Take 2 Tabs by mouth every day. 60 Tab 1   • pantoprazole (PROTONIX) 40 MG Tablet Delayed Response Take 1 Tab by mouth 2 times a day. 60 Tab 6   • [DISCONTINUED] metoprolol (LOPRESSOR) 25 MG Tab Take 1 Tab by mouth 2 Times a Day. 60 Tab 11     No facility-administered encounter medications on file as of 4/28/2017.     Review of Systems   Constitutional: Negative for malaise/fatigue.   HENT: Negative for congestion.    Respiratory: Negative for hemoptysis and shortness of breath.    Cardiovascular: Negative for chest pain, palpitations, orthopnea, claudication, leg swelling and PND.   Gastrointestinal: Negative for nausea, vomiting, abdominal pain and blood in stool.   Musculoskeletal: Negative for myalgias.   Neurological: Negative for dizziness, speech change, focal weakness and weakness.        Objective:   /98 mmHg  Pulse 72  Ht 1.803 m (5' 10.98\")  Wt 105.235 kg (232 lb)  BMI 32.37 kg/m2  SpO2 94%    Physical Exam   Constitutional: He is oriented to person, place, and time. No distress.   Obese   HENT:   Mouth/Throat: Mucous membranes are normal.   Eyes: Conjunctivae and EOM are normal.   Neck: No JVD present. No " tracheal deviation present. No thyroid mass and no thyromegaly present.   Cardiovascular: Normal rate and intact distal pulses.  An irregularly irregular rhythm present. Exam reveals distant heart sounds.    No murmur heard.  Pulmonary/Chest: Effort normal and breath sounds normal. No respiratory distress. He exhibits no tenderness.   Abdominal: Soft. There is no tenderness.   Musculoskeletal: Normal range of motion. He exhibits no edema.   Neurological: He is alert and oriented to person, place, and time. He has normal strength. He displays no tremor.   Skin: Skin is warm and dry. He is not diaphoretic.   Psychiatric: He has a normal mood and affect. His behavior is normal.   Vitals reviewed.      Assessment:     1. Paroxysmal atrial fibrillation (CMS-formerly [facility])  apixaban (ELIQUIS) 5mg Tab   2. Chronic anticoagulation no recent bleeding issue    3. Essential hypertension, benign BP probably not wellcontrolled metoprolol (LOPRESSOR) 50 MG Tab   4. Morbid obesity due to excess calories (CMS-HCC)     5. Mixed hyperlipidemia         Medical Decision Making:  Today's Assessment / Status / Plan:     Will increase metoprolol to 50 mg BID  If HR too slow, will try switching verapamil to amlodipine or ACEI  Need weight reduction  He will have labs done for PCP. Will see those results.  RTC in 6 months or sooner as needed.  We will keep you posted about our findings and further recommendations as they become available. Please also do not hesitate to call for any questions.  Thank you kindly for allowing me to participate in the care of this patient.        No Recipients

## 2017-04-28 NOTE — PROGRESS NOTES
Subjective:   Kip Harper is a 67 y.o. male who presents today for f/u AF, HTN and anticoagulation    No cardiac complaints    Admitted last summer for GI bleed  Found to have ulcer. Was taking NSAIDs then.  No bleeding issue since.    BP was high during that admission. Metoprolol 25 mg BID was added.      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      Past Surgical History   Procedure Laterality Date   • Gastroscopy-endo  8/11/2016     Procedure: GASTROSCOPY-ENDO;  Surgeon: Jones Moyer M.D.;  Location: [facility];  Service:      Family History   Problem Relation Age of Onset   • Heart Disease Mother    • Diabetes Mother    • Heart Disease Father    • Lung Disease Father      History   Smoking status   • Never Smoker    Smokeless tobacco   • Never Used     Allergies   Allergen Reactions   • No Known Drug Allergy      Outpatient Encounter Prescriptions as of 4/28/2017   Medication Sig Dispense Refill   • metoprolol (LOPRESSOR) 50 MG Tab Take 1 Tab by mouth 2 Times a Day. 60 Tab 11   • apixaban (ELIQUIS) 5mg Tab Take 1 Tab by mouth 2 Times a Day. 60 Tab 11   • pravastatin (PRAVACHOL) 40 MG tablet Take 40 mg by mouth every evening.     • verapamil ER (CALAN-SR) 180 MG Tab CR Take 180 mg by mouth every evening.     • predniSONE (DELTASONE) 20 MG Tab Take 2 Tabs by mouth every day for 5 days. 10 Tab 0   • oxycodone-acetaminophen (PERCOCET) 5-325 MG Tab Take 1-2 Tabs by mouth every 6 hours as needed for Severe Pain. 15 Tab 0   • predniSONE (DELTASONE) 10 MG Tab Take 1 tablet 3 times daily for 5 days 15 Tab 1   • MethylPREDNISolone (MEDROL DOSEPAK) 4 MG Tablet Therapy Pack Take 1 Tab by mouth every day. 1 Kit 0   • pravastatin (PRAVACHOL) 40 MG tablet TAKE 1 TABLET BY MOUTH IN THE EVENING 90 Tab 3   •  "[DISCONTINUED] apixaban (ELIQUIS) 5mg Tab Take 1 Tab by mouth 2 Times a Day. 70 Tab 0   • MethylPREDNISolone (MEDROL DOSEPAK) 4 MG Tablet Therapy Pack TAKE AS DIRECTED ON PACKAGE. 21 Tab 0   • colchicine (COLCRYS) 0.6 MG Tab TAKE 2 TABS AT SIGNS OF GOUT ATTACK, THEN 1 TAB ONE HOUR AFTER FIRST DOSE. DO NOT REPEAT EARLIER THAN 3 DAYS. 30 Tab 0   • hydrocodone-acetaminophen (NORCO) 5-325 MG Tab per tablet 1 TAB EVERY 6 HOURS ONLY IF NEEDED FOR PAIN. MAY CAUSE DROWSINESS. 28 Tab 0   • potassium chloride SA (K-DUR) 20 MEQ Tab CR Take 2 Tabs by mouth every day. 60 Tab 1   • pantoprazole (PROTONIX) 40 MG Tablet Delayed Response Take 1 Tab by mouth 2 times a day. 60 Tab 6   • [DISCONTINUED] metoprolol (LOPRESSOR) 25 MG Tab Take 1 Tab by mouth 2 Times a Day. 60 Tab 11     No facility-administered encounter medications on file as of 4/28/2017.     Review of Systems   Constitutional: Negative for malaise/fatigue.   HENT: Negative for congestion.    Respiratory: Negative for hemoptysis and shortness of breath.    Cardiovascular: Negative for chest pain, palpitations, orthopnea, claudication, leg swelling and PND.   Gastrointestinal: Negative for nausea, vomiting, abdominal pain and blood in stool.   Musculoskeletal: Negative for myalgias.   Neurological: Negative for dizziness, speech change, focal weakness and weakness.        Objective:   /98 mmHg  Pulse 72  Ht 1.803 m (5' 10.98\")  Wt 105.235 kg (232 lb)  BMI 32.37 kg/m2  SpO2 94%    Physical Exam   Constitutional: He is oriented to person, place, and time. No distress.   Obese   HENT:   Mouth/Throat: Mucous membranes are normal.   Eyes: Conjunctivae and EOM are normal.   Neck: No JVD present. No tracheal deviation present. No thyroid mass and no thyromegaly present.   Cardiovascular: Normal rate and intact distal pulses.  An irregularly irregular rhythm present. Exam reveals distant heart sounds.    No murmur heard.  Pulmonary/Chest: Effort normal and breath " sounds normal. No respiratory distress. He exhibits no tenderness.   Abdominal: Soft. There is no tenderness.   Musculoskeletal: Normal range of motion. He exhibits no edema.   Neurological: He is alert and oriented to person, place, and time. He has normal strength. He displays no tremor.   Skin: Skin is warm and dry. He is not diaphoretic.   Psychiatric: He has a normal mood and affect. His behavior is normal.   Vitals reviewed.      Assessment:     1. Paroxysmal atrial fibrillation (CMS-[facility])  apixaban (ELIQUIS) 5mg Tab   2. Chronic anticoagulation no recent bleeding issue    3. Essential hypertension, benign BP probably not wellcontrolled metoprolol (LOPRESSOR) 50 MG Tab   4. Morbid obesity due to excess calories (CMS-[facility])     5. Mixed hyperlipidemia         Medical Decision Making:  Today's Assessment / Status / Plan:     Will increase metoprolol to 50 mg BID  If HR too slow, will try switching verapamil to amlodipine or ACEI  Need weight reduction  He will have labs done for PCP. Will see those results.  RTC in 6 months or sooner as needed.  We will keep you posted about our findings and further recommendations as they become available. Please also do not hesitate to call for any questions.  Thank you kindly for allowing me to participate in the care of this patient.

## 2017-04-28 NOTE — MR AVS SNAPSHOT
"        Kip Harper   2017 11:00 AM   Office Visit   MRN: [id number]    Department:  [facility]   Dept Phone:  [phone]    Description:  Male : 1950   Provider:  Ct Hess M.D.           Reason for Visit     Follow-Up           Allergies as of 2017     Allergen Noted Reactions    No Known Drug Allergy 2012         You were diagnosed with     Paroxysmal atrial fibrillation (CMS-HCC)   [041520]       Chronic anticoagulation   [724670]       Essential hypertension, benign   [401.1.ICD-9-CM]       Morbid obesity due to excess calories (CMS-HCC)   [2419746]       Mixed hyperlipidemia   [272.2.ICD-9-CM]         Vital Signs     Blood Pressure Pulse Height Weight Body Mass Index Oxygen Saturation    150/98 mmHg 72 1.803 m (5' 10.98\") 105.235 kg (232 lb) 32.37 kg/m2 94%    Smoking Status                   Never Smoker            Basic Information     Date Of Birth Sex Race Ethnicity Preferred Language    1950 Male  or   Origin (Equ[place]an,Faroese,Ugandan,Rufus, etc) English      Your appointments     Oct 13, 2017  8:20 AM   FOLLOW UP with Ct Hess M.D.   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 R76.11   Unknown - Present    GOUT  High  3/15/2012 - Present    Vision loss of right eye H54.61   2014 - Present    Atrial fibrillation (CMS-HCC) I48.91   2014 - Present    Essential hypertension, benign I10 Medium  2014 - Present    Mitral regurgitation I34.0 Medium  2014 - Present   " Chronic anticoagulation Z79.01   7/31/2014 - Present    Gout M10.9   11/14/2014 - Present    Melena K92.1   8/9/2016 - Present    Syncope R55 High  8/9/2016 - Present    Anemia D64.9 High  8/10/2016 - Present      Health Maintenance        Date Due Completion Dates    IMM DTaP/Tdap/Td Vaccine (1 - Tdap) 4/7/1968 ---    COLONOSCOPY 4/7/1999 ---    IMM ZOSTER VACCINE 4/7/2009 ---    IMM PNEUMOCOCCAL 65+ (ADULT) LOW/MEDIUM RISK SERIES (1 of 2 - PCV13) 4/7/2014 ---            Current Immunizations     No immunizations on file.      Below and/or attached are the medications your provider expects you to take. Review all of your home medications and newly ordered medications with your provider and/or pharmacist. Follow medication instructions as directed by your provider and/or pharmacist. Please keep your medication list with you and share with your provider. Update the information when medications are discontinued, doses are changed, or new medications (including over-the-counter products) are added; and carry medication information at all times in the event of emergency situations     Allergies:  NO KNOWN DRUG ALLERGY - (reactions not documented)               Medications  Valid as of: April 28, 2017 - 11:24 AM    Generic Name Brand Name Tablet Size Instructions for use    Apixaban (Tab) ELIQUIS 5mg Take 1 Tab by mouth 2 Times a Day.        Colchicine (Tab) COLCRYS 0.6 MG TAKE 2 TABS AT SIGNS OF GOUT ATTACK, THEN 1 TAB ONE HOUR AFTER FIRST DOSE. DO NOT REPEAT EARLIER THAN 3 DAYS.        Hydrocodone-Acetaminophen (Tab) NORCO 5-325 MG 1 TAB EVERY 6 HOURS ONLY IF NEEDED FOR PAIN. MAY CAUSE DROWSINESS.        MethylPREDNISolone (Tablet Therapy Pack) MEDROL DOSEPAK 4 MG TAKE AS DIRECTED ON PACKAGE.        MethylPREDNISolone (Tablet Therapy Pack) MEDROL DOSEPAK 4 MG Take 1 Tab by mouth every day.        Metoprolol Tartrate (Tab) LOPRESSOR 50 MG Take 1 Tab by mouth 2 Times a Day.        Oxycodone-Acetaminophen (Tab) PERCOCET  5-325 MG Take 1-2 Tabs by mouth every 6 hours as needed for Severe Pain.        Pantoprazole Sodium (Tablet Delayed Response) PROTONIX 40 MG Take 1 Tab by mouth 2 times a day.        Potassium Chloride Oksana CR (Tab CR) Kdur 20 MEQ Take 2 Tabs by mouth every day.        Pravastatin Sodium (Tab) PRAVACHOL 40 MG Take 40 mg by mouth every evening.        Pravastatin Sodium (Tab) PRAVACHOL 40 MG TAKE 1 TABLET BY MOUTH IN THE EVENING        PredniSONE (Tab) DELTASONE 10 MG Take 1 tablet 3 times daily for 5 days        PredniSONE (Tab) DELTASONE 20 MG Take 2 Tabs by mouth every day for 5 days.        Verapamil HCl (Tab CR) CALAN- MG Take 180 mg by mouth every evening.        .                 Medicines prescribed today were sent to:     [id number] - [place], [place] - [address]    [address] [place] [address]    Phone: [phone] Fax: [phone]    Open 24 Hours?: No      Medication refill instructions:       If your prescription bottle indicates you have medication refills left, it is not necessary to call your provider’s office. Please contact your pharmacy and they will refill your medication.    If your prescription bottle indicates you do not have any refills left, you may request refills at any time through one of the following ways: The online [employer] system (except Urgent Care), by calling your provider’s office, or by asking your pharmacy to contact your provider’s office with a refill request. Medication refills are processed only during regular business hours and may not be available until the next business day. Your provider may request additional information or to have a follow-up visit with you prior to refilling your medication.   *Please Note: Medication refills are assigned a new Rx number when refilled electronically. Your pharmacy may indicate that no refills were authorized even though a new prescription for the same medication is available at the pharmacy. Please request the  medicine by name with the pharmacy before contacting your provider for a refill.           TransferWise Access Code: [email or web address]  Expires: 5/16/2017 10:31 AM    TransferWise  A secure, online tool to manage your health information     [employer]’s TransferWise® is a secure, online tool that connects you to your personalized health information from the privacy of your home -- day or night - making it very easy for you to manage your healthcare. Once the activation process is completed, you can even access your medical information using the TransferWise cr, which is available for free in the Apple Cr store or Google Play store.     TransferWise provides the following levels of access (as shown below):   My Chart Features   [facility] Doctor [facility]  Specialists [facility]  Urgent  Care Non-[facility]  Primary Care  Doctor   Email your healthcare team securely and privately 24/7 X X X    Manage appointments: schedule your next appointment; view details of past/upcoming appointments X      Request prescription refills. X      View recent personal medical records, including lab and immunizations X X X X   View health record, including health history, allergies, medications X X X X   Read reports about your outpatient visits, procedures, consult and ER notes X X X X   See your discharge summary, which is a recap of your hospital and/or ER visit that includes your diagnosis, lab results, and care plan. X X       How to register for TransferWise:  1. Go to  [email or web address].  2. Click on the Sign Up Now box, which takes you to the New Member Sign Up page. You will need to provide the following information:  a. Enter your TransferWise Access Code exactly as it appears at the top of this page. (You will not need to use this code after you’ve completed the sign-up process. If you do not sign up before the expiration date, you must request a new code.)   b. Enter your date of birth.   c. Enter your home email address.   d. Click Submit, and  follow the next screen’s instructions.  3. Create a [employer] ID. This will be your [employer] login ID and cannot be changed, so think of one that is secure and easy to remember.  4. Create a [employer] password. You can change your password at any time.  5. Enter your Password Reset Question and Answer. This can be used at a later time if you forget your password.   6. Enter your e-mail address. This allows you to receive e-mail notifications when new information is available in [employer].  7. Click Sign Up. You can now view your health information.    For assistance activating your [employer] account, call [phone]

## 2017-05-23 ENCOUNTER — OFFICE VISIT (OUTPATIENT)
Dept: URGENT CARE | Facility: PHYSICIAN GROUP | Age: 67
End: 2017-05-23
Payer: COMMERCIAL

## 2017-05-23 VITALS
OXYGEN SATURATION: 95 % | HEIGHT: 71 IN | DIASTOLIC BLOOD PRESSURE: 84 MMHG | RESPIRATION RATE: 16 BRPM | TEMPERATURE: 99.2 F | BODY MASS INDEX: 31.5 KG/M2 | HEART RATE: 74 BPM | WEIGHT: 225 LBS | SYSTOLIC BLOOD PRESSURE: 142 MMHG

## 2017-05-23 DIAGNOSIS — M10.9 ACUTE GOUT OF LEFT ANKLE, UNSPECIFIED CAUSE: ICD-10-CM

## 2017-05-23 DIAGNOSIS — Z86.79 PERSONAL HISTORY OF ATRIAL FIBRILLATION: ICD-10-CM

## 2017-05-23 PROCEDURE — 4040F PNEUMOC VAC/ADMIN/RCVD: CPT | Mod: 8P | Performed by: PHYSICIAN ASSISTANT

## 2017-05-23 PROCEDURE — 1036F TOBACCO NON-USER: CPT | Performed by: PHYSICIAN ASSISTANT

## 2017-05-23 PROCEDURE — 3017F COLORECTAL CA SCREEN DOC REV: CPT | Mod: 8P | Performed by: PHYSICIAN ASSISTANT

## 2017-05-23 PROCEDURE — G8432 DEP SCR NOT DOC, RNG: HCPCS | Performed by: PHYSICIAN ASSISTANT

## 2017-05-23 PROCEDURE — 99214 OFFICE O/P EST MOD 30 MIN: CPT | Performed by: PHYSICIAN ASSISTANT

## 2017-05-23 PROCEDURE — G8419 CALC BMI OUT NRM PARAM NOF/U: HCPCS | Performed by: PHYSICIAN ASSISTANT

## 2017-05-23 PROCEDURE — 1101F PT FALLS ASSESS-DOCD LE1/YR: CPT | Mod: 8P | Performed by: PHYSICIAN ASSISTANT

## 2017-05-23 RX ORDER — OXYCODONE HYDROCHLORIDE AND ACETAMINOPHEN 5; 325 MG/1; MG/1
1 TABLET ORAL EVERY 6 HOURS PRN
Qty: 12 TAB | Refills: 0 | Status: SHIPPED | OUTPATIENT
Start: 2017-05-23 | End: 2017-05-26

## 2017-05-23 RX ORDER — METHYLPREDNISOLONE 4 MG/1
TABLET ORAL
Qty: 1 KIT | Refills: 0 | Status: SHIPPED | OUTPATIENT
Start: 2017-05-23 | End: 2019-03-16

## 2017-05-23 ASSESSMENT — ENCOUNTER SYMPTOMS
SORE THROAT: 0
LOSS OF SENSATION: 0
LOSS OF MOTION: 0
COUGH: 0
NECK PAIN: 0
FALLS: 0
WHEEZING: 0
FEVER: 0
BACK PAIN: 0
MUSCLE WEAKNESS: 0
NUMBNESS: 0
SHORTNESS OF BREATH: 0
HEADACHES: 0
TINGLING: 0
INABILITY TO BEAR WEIGHT: 0
CHILLS: 0

## 2017-05-23 NOTE — PROGRESS NOTES
"Subjective:      Kip Harper is a 67 y.o. male who presents with Ankle Pain          Pt is 68 y/o male who presents with left ankle pain since this morning- he reports history of same recently this year with his flares of gout- he has not responded well with colchicine and indomethacin is contraindicated with Eliquis- he has done well with pain meds and steroids. He was recently started on Allopurinol a few weeks ago- however since this morning stopped.   Ankle Pain   The incident occurred 12 to 24 hours ago. The incident occurred at home. There was no injury mechanism. The pain is present in the left ankle. The pain is at a severity of 7/10. The pain is moderate. The pain has been worsening since onset. Pertinent negatives include no inability to bear weight, loss of motion, loss of sensation, muscle weakness, numbness or tingling. He reports no foreign bodies present. The symptoms are aggravated by movement and palpation.   Denies any injury, trauma, calf pain or swelling. Denies any fevers.     Review of Systems   Constitutional: Negative for fever, chills and malaise/fatigue.   HENT: Negative for sore throat.    Respiratory: Negative for cough, shortness of breath and wheezing.    Cardiovascular: Negative for chest pain and leg swelling.   Musculoskeletal: Positive for joint pain. Negative for back pain, falls and neck pain.   Skin: Negative for itching and rash.   Neurological: Negative for tingling, numbness and headaches.          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  MEDS:   Current outpatient prescriptions:   •  oxycodone-acetaminophen (PERCOCET) 5-325 MG Tab, Take 1 Tab by " mouth every 6 hours as needed for Severe Pain for up to 3 days., Disp: 12 Tab, Rfl: 0  •  MethylPREDNISolone (MEDROL DOSEPAK) 4 MG Tablet Therapy Pack, UAD, Disp: 1 Kit, Rfl: 0  •  metoprolol (LOPRESSOR) 50 MG Tab, Take 1 Tab by mouth 2 Times a Day., Disp: 60 Tab, Rfl: 11  •  apixaban (ELIQUIS) 5mg Tab, Take 1 Tab by mouth 2 Times a Day., Disp: 60 Tab, Rfl: 11  •  oxycodone-acetaminophen (PERCOCET) 5-325 MG Tab, Take 1-2 Tabs by mouth every 6 hours as needed for Severe Pain., Disp: 15 Tab, Rfl: 0  •  predniSONE (DELTASONE) 10 MG Tab, Take 1 tablet 3 times daily for 5 days, Disp: 15 Tab, Rfl: 1  •  MethylPREDNISolone (MEDROL DOSEPAK) 4 MG Tablet Therapy Pack, Take 1 Tab by mouth every day., Disp: 1 Kit, Rfl: 0  •  pravastatin (PRAVACHOL) 40 MG tablet, TAKE 1 TABLET BY MOUTH IN THE EVENING, Disp: 90 Tab, Rfl: 3  •  MethylPREDNISolone (MEDROL DOSEPAK) 4 MG Tablet Therapy Pack, TAKE AS DIRECTED ON PACKAGE., Disp: 21 Tab, Rfl: 0  •  colchicine (COLCRYS) 0.6 MG Tab, TAKE 2 TABS AT SIGNS OF GOUT ATTACK, THEN 1 TAB ONE HOUR AFTER FIRST DOSE. DO NOT REPEAT EARLIER THAN 3 DAYS., Disp: 30 Tab, Rfl: 0  •  hydrocodone-acetaminophen (NORCO) 5-325 MG Tab per tablet, 1 TAB EVERY 6 HOURS ONLY IF NEEDED FOR PAIN. MAY CAUSE DROWSINESS., Disp: 28 Tab, Rfl: 0  •  potassium chloride SA (K-DUR) 20 MEQ Tab CR, Take 2 Tabs by mouth every day., Disp: 60 Tab, Rfl: 1  •  pantoprazole (PROTONIX) 40 MG Tablet Delayed Response, Take 1 Tab by mouth 2 times a day., Disp: 60 Tab, Rfl: 6  •  pravastatin (PRAVACHOL) 40 MG tablet, Take 40 mg by mouth every evening., Disp: , Rfl:   •  verapamil ER (CALAN-SR) 180 MG Tab CR, Take 180 mg by mouth every evening., Disp: , Rfl:   ALLERGIES:   Allergies   Allergen Reactions   • No Known Drug Allergy      SURGHX:   Past Surgical History   Procedure Laterality Date   • Gastroscopy-endo  8/11/2016     Procedure: GASTROSCOPY-ENDO;  Surgeon: oJnes Moyer M.D.;  [facility];  Service:       SOCHX:  reports that he has never smoked. He has never used smokeless tobacco. He reports that he does not drink alcohol or use illicit drugs.  FH: Family history was reviewed, no pertinent findings to report    Physical Exam   Constitutional: He is oriented to person, place, and time. He appears well-developed and well-nourished.   HENT:   Head: Normocephalic and atraumatic.   Eyes: EOM are normal. Pupils are equal, round, and reactive to light.   Neck: Normal range of motion. Neck supple.   Cardiovascular:   Irregularly Irregular.    Pulmonary/Chest: Effort normal. No respiratory distress.   Musculoskeletal:        Left ankle: He exhibits swelling. Tenderness.        Feet:    Left lateral ankle with edema, mild erythema, and significant diffuse tenderness- with light palpation. Pt. Is ambulatory, and with FROM.    Neurological: He is alert and oriented to person, place, and time.   Skin: Skin is warm. No rash noted.   Psychiatric: He has a normal mood and affect. His behavior is normal.   Vitals reviewed.              Assessment/Plan:     1. Acute gout of left ankle, unspecified cause  - oxycodone-acetaminophen (PERCOCET) 5-325 MG Tab; Take 1 Tab by mouth every 6 hours as needed for Severe Pain for up to 3 days.  Dispense: 12 Tab; Refill: 0  - MethylPREDNISolone (MEDROL DOSEPAK) 4 MG Tablet Therapy Pack; UAD  Dispense: 1 Kit; Refill: 0    2. Personal history of atrial fibrillation    At this time patient has not been successful with Colchicine, and has done well with steroids and pain meds. Finally indomethacin is contraindicated with Eliquis.   [place]  was reviewed by myself-  Document  does not reveal any concerning patterns. Pt. was advised to avoid the operation of heavy machine along with driving while on such medications. Finally pt. was advised to use medication only as prescribed.     Discussed with the patient the long term risk of steroids- also to F/U with PCP to discuss further meds- or dose changes  of allopurinol along with diet changes.   Finally pt. Reports normal sugars- although encouraged the patient to discuss this further with PCP- as I cannot see an updated lab.   Patient given precautionary s/sx that mandate immediate follow up and evaluation in the ED. Advised of risks of not doing so.    DDX, Supportive care, and indications for immediate follow-up discussed with patient.    Instructed to return to clinic or nearest emergency department if we are not available for any change in condition, further concerns, or worsening of symptoms.    The patient demonstrated a good understanding and agreed with the treatment plan.

## 2017-05-23 NOTE — MR AVS SNAPSHOT
"        Kip O Leroy   2017 8:30 AM   Office Visit   MRN: [id number]    Department:  [facility]   Dept Phone:  [phone]    Description:  Male : 1950   Provider:  Vijay Hill PA-C           Reason for Visit     Ankle Pain swelling,pain and hot area in the ankle, hx of gout       Allergies as of 2017     Allergen Noted Reactions    No Known Drug Allergy 2012         You were diagnosed with     Acute gout of left ankle, unspecified cause   [[id number]]         Vital Signs     Blood Pressure Pulse Temperature Respirations Height Weight    142/84 mmHg 74 37.3 °C (99.2 °F) 16 1.803 m (5' 11\") 102.059 kg (225 lb)    Body Mass Index Oxygen Saturation Smoking Status             31.39 kg/m2 95% Never Smoker          Basic Information     Date Of Birth Sex Race Ethnicity Preferred Language    1950 Male  or   Origin (Tajik,Taiwanese,Nicaraguan,Rufus, etc) English      Your appointments     Oct 13, 2017  8:20 AM   FOLLOW UP with Ct Hess M.D.   [facility] (--)    [address], [address]  [facility] [id number] [phone]              Problem List              ICD-10-CM Priority Class Noted - Resolved    Hyperlipidemia E78.5 High  Unknown - Present    Hypertensive heart disease  High  Unknown - Present    Obesity E66.9   Unknown - Present    Paroxysmal atrial fibrillation (CMS-HCC) I48.0 High  Unknown - Present    Sleep apnea G47.30   Unknown - Present    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 R76.11   Unknown - Present    GOUT  High  3/15/2012 - Present    Vision loss of right eye H54.61   2014 - Present    Atrial fibrillation (CMS-HCC) I48.91   2014 - Present    Essential hypertension, benign I10 Medium  2014 - Present    Mitral regurgitation I34.0 Medium  2014 - Present    Chronic anticoagulation Z79.01   2014 - Present    Gout M10.9   2014 - Present    Melena K92.1   2016 - " Present    Syncope R55 [facility]  8/9/2016 - Present    Anemia D64.9 High  8/10/2016 - Present      Health Maintenance        Date Due Completion Dates    IMM DTaP/Tdap/Td Vaccine (1 - Tdap) 4/7/1968 ---    COLONOSCOPY 4/7/1999 ---    IMM ZOSTER VACCINE 4/7/2009 ---    IMM PNEUMOCOCCAL 65+ (ADULT) LOW/MEDIUM RISK SERIES (1 of 2 - PCV13) 4/7/2014 ---            Current Immunizations     No immunizations on file.      Below and/or attached are the medications your provider expects you to take. Review all of your home medications and newly ordered medications with your provider and/or pharmacist. Follow medication instructions as directed by your provider and/or pharmacist. Please keep your medication list with you and share with your provider. Update the information when medications are discontinued, doses are changed, or new medications (including over-the-counter products) are added; and carry medication information at all times in the event of emergency situations     Allergies:  NO KNOWN DRUG ALLERGY - (reactions not documented)               Medications  Valid as of: May 23, 2017 -  8:55 AM    Generic Name Brand Name Tablet Size Instructions for use    Apixaban (Tab) ELIQUIS 5mg Take 1 Tab by mouth 2 Times a Day.        Colchicine (Tab) COLCRYS 0.6 MG TAKE 2 TABS AT SIGNS OF GOUT ATTACK, THEN 1 TAB ONE HOUR AFTER FIRST DOSE. DO NOT REPEAT EARLIER THAN 3 DAYS.        Hydrocodone-Acetaminophen (Tab) NORCO 5-325 MG 1 TAB EVERY 6 HOURS ONLY IF NEEDED FOR PAIN. MAY CAUSE DROWSINESS.        MethylPREDNISolone (Tablet Therapy Pack) MEDROL DOSEPAK 4 MG TAKE AS DIRECTED ON PACKAGE.        MethylPREDNISolone (Tablet Therapy Pack) MEDROL DOSEPAK 4 MG Take 1 Tab by mouth every day.        MethylPREDNISolone (Tablet Therapy Pack) MEDROL DOSEPAK 4 MG UAD        Metoprolol Tartrate (Tab) LOPRESSOR 50 MG Take 1 Tab by mouth 2 Times a Day.        Oxycodone-Acetaminophen (Tab) PERCOCET 5-325 MG Take 1-2 Tabs by mouth every 6 hours as  needed for Severe Pain.        Oxycodone-Acetaminophen (Tab) PERCOCET 5-325 MG Take 1 Tab by mouth every 6 hours as needed for Severe Pain for up to 3 days.        Pantoprazole Sodium (Tablet Delayed Response) PROTONIX 40 MG Take 1 Tab by mouth 2 times a day.        Potassium Chloride Oksana CR (Tab CR) Kdur 20 MEQ Take 2 Tabs by mouth every day.        Pravastatin Sodium (Tab) PRAVACHOL 40 MG Take 40 mg by mouth every evening.        Pravastatin Sodium (Tab) PRAVACHOL 40 MG TAKE 1 TABLET BY MOUTH IN THE EVENING        PredniSONE (Tab) DELTASONE 10 MG Take 1 tablet 3 times daily for 5 days        Verapamil HCl (Tab CR) CALAN- MG Take 180 mg by mouth every evening.        .                 Medicines prescribed today were sent to:     [facility]/PHARMACY #[id number] - [place], [place] - [address]    [address] [place] [address]    Phone: [phone] Fax: [phone]    Open 24 Hours?: No      Medication refill instructions:       If your prescription bottle indicates you have medication refills left, it is not necessary to call your provider’s office. Please contact your pharmacy and they will refill your medication.    If your prescription bottle indicates you do not have any refills left, you may request refills at any time through one of the following ways: The online [employer] system (except Urgent Care), by calling your provider’s office, or by asking your pharmacy to contact your provider’s office with a refill request. Medication refills are processed only during regular business hours and may not be available until the next business day. Your provider may request additional information or to have a follow-up visit with you prior to refilling your medication.   *Please Note: Medication refills are assigned a new Rx number when refilled electronically. Your pharmacy may indicate that no refills were authorized even though a new prescription for the same medication is available at the pharmacy. Please request the  medicine by name with the pharmacy before contacting your provider for a refill.           MyChart Access Code: Activation code not generated  Current MyChart Status: Active

## 2017-08-02 ENCOUNTER — OFFICE VISIT (OUTPATIENT)
Dept: URGENT CARE | Facility: PHYSICIAN GROUP | Age: 67
End: 2017-08-02
Payer: COMMERCIAL

## 2017-08-02 VITALS
SYSTOLIC BLOOD PRESSURE: 150 MMHG | HEIGHT: 71 IN | DIASTOLIC BLOOD PRESSURE: 96 MMHG | RESPIRATION RATE: 18 BRPM | TEMPERATURE: 97.3 F | HEART RATE: 75 BPM | WEIGHT: 220 LBS | OXYGEN SATURATION: 96 % | BODY MASS INDEX: 30.8 KG/M2

## 2017-08-02 DIAGNOSIS — M10.9 ACUTE GOUT INVOLVING TOE OF RIGHT FOOT, UNSPECIFIED CAUSE: ICD-10-CM

## 2017-08-02 PROCEDURE — 99214 OFFICE O/P EST MOD 30 MIN: CPT | Performed by: NURSE PRACTITIONER

## 2017-08-02 RX ORDER — OXYCODONE HYDROCHLORIDE AND ACETAMINOPHEN 5; 325 MG/1; MG/1
1-2 TABLET ORAL EVERY 6 HOURS PRN
Qty: 10 TAB | Refills: 0 | Status: SHIPPED | OUTPATIENT
Start: 2017-08-02 | End: 2019-03-16

## 2017-08-02 RX ORDER — METHYLPREDNISOLONE 4 MG/1
4 TABLET ORAL DAILY
Qty: 1 KIT | Refills: 0 | Status: SHIPPED | OUTPATIENT
Start: 2017-08-02 | End: 2019-03-16

## 2017-08-02 ASSESSMENT — ENCOUNTER SYMPTOMS: FEVER: 0

## 2017-08-02 NOTE — PROGRESS NOTES
"Subjective:      Kip Harper is a 67 y.o. male who presents with Foot Pain            Foot Swelling  This is a new problem. Episode onset: 3 days ago he noticed pain and swelling to right foot around big toe. Over the past few days the pain has gotten worse and it is difficult for him to walk. He does have a strong history of gout. The problem occurs constantly. The problem has been gradually worsening. Pertinent negatives include no fever. Associated symptoms comments: Redness, warmth and pain to right great toe. The symptoms are aggravated by walking and standing. He has tried nothing (Pt states that the medrol paks work best for him) for the symptoms.       Review of Systems   Constitutional: Negative for fever.   Musculoskeletal: Positive for joint pain (right, great toe).   All other systems reviewed and are negative.    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       Past Surgical History   Procedure Laterality Date   • Gastroscopy-endo  8/11/2016     Procedure: GASTROSCOPY-ENDO;  Surgeon: Jones Moyer M.D.;  Location: ENDOSCOPY [facility];  Service:       Social History     Social History   • Marital Status:      Spouse Name: N/A   • Number of Children: N/A   • Years of Education: N/A     Occupational History   • Not on file.     Social History Main Topics   • Smoking status: Never Smoker    • Smokeless tobacco: Never Used   • Alcohol Use: No   • Drug Use: No   • Sexual Activity: Not on file     Other Topics Concern   • Not on file     Social History Narrative          Objective:     /96 mmHg  Pulse 75  Temp(Src) 36.3 °C (97.3 °F)  Resp 18  Ht 1.803 m (5' 10.98\")  Wt 99.791 kg (220 lb)  BMI 30.70 kg/m2  SpO2 96%     Physical Exam   Constitutional: He is oriented to person, place, and " time. Vital signs are normal. He appears well-developed and well-nourished.   HENT:   Head: Normocephalic and atraumatic.   Eyes: EOM are normal. Pupils are equal, round, and reactive to light.   Neck: Normal range of motion.   Cardiovascular: Normal rate and regular rhythm.    Pulmonary/Chest: Effort normal.   Musculoskeletal:        Right foot: There is tenderness and swelling. There is no deformity.        Feet:    Neurological: He is alert and oriented to person, place, and time.   Skin: Skin is warm and dry.   Psychiatric: He has a normal mood and affect. His speech is normal and behavior is normal. Thought content normal.   Vitals reviewed.              Assessment/Plan:     1. Acute gout involving toe of right foot, unspecified cause  - MethylPREDNISolone (MEDROL DOSEPAK) 4 MG Tablet Therapy Pack; Take 1 Tab by mouth every day.  Dispense: 1 Kit; Refill: 0  - oxycodone-acetaminophen (PERCOCET) 5-325 MG Tab; Take 1-2 Tabs by mouth every 6 hours as needed.  Dispense: 10 Tab; Refill: 0    Pt states he knows he gets gout frequently and has tried some dietary changes but does not think they are working so he stopped that  He has had gout 3 times this year so far  I advised him he should follow up with his primary and figure out a better plan to control his gout because frequent treatments, such as the one I am providing today, should not be used this often. He V/U  May use Ibuprofen PRN pain  Reinforced dietary restrictions/triggers. Pt does not seem interested  Supportive care, differential diagnoses, and indications for immediate follow-up discussed with patient.    Pathogenesis of diagnosis discussed including typical length and natural progression.      Instructed to return to  or nearest emergency department if symptoms fail to improve, for any change in condition, further concerns, or new concerning symptoms.  Patient states understanding of the plan of care and discharge instructions.

## 2017-08-02 NOTE — MR AVS SNAPSHOT
"        Kipblair Harper   2017 4:00 PM   Office Visit   MRN: [id number]    Department:  [facility]   Dept Phone:  [phone]    Description:  Male : 1950   Provider:  KG Acosta           Reason for Visit     Foot Pain right foot pain x2-3 days; hx gout      Allergies as of 2017     Allergen Noted Reactions    No Known Drug Allergy 2012         You were diagnosed with     Acute gout involving toe of right foot, unspecified cause   [[id number]]         Vital Signs     Blood Pressure Pulse Temperature Respirations Height Weight    150/96 mmHg 75 36.3 °C (97.3 °F) 18 1.803 m (5' 10.98\") 99.791 kg (220 lb)    Body Mass Index Oxygen Saturation Smoking Status             30.70 kg/m2 96% Never Smoker          Basic Information     Date Of Birth Sex Race Ethnicity Preferred Language    1950 Male  or   Origin (Stateless,Indian,[place] citizen,Rufus, etc) English      Your appointments     Oct 13, 2017  8:20 AM   FOLLOW UP with Ct Hess M.D.   [facility] (--)    [address], [address]  [facility] [address] [phone]              Problem List              ICD-10-CM Priority Class Noted - Resolved    Hyperlipidemia E78.5 High  Unknown - Present    Hypertensive heart disease  High  Unknown - Present    Obesity E66.9   Unknown - Present    Paroxysmal atrial fibrillation (CMS-HCC) I48.0 High  Unknown - Present    Sleep apnea G47.30   Unknown - Present    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 R76.11   Unknown - Present    GOUT  High  3/15/2012 - Present    Vision loss of right eye H54.61   2014 - Present    Atrial fibrillation (CMS-HCC) I48.91   2014 - Present    Essential hypertension, benign I10 Medium  2014 - Present    Mitral regurgitation I34.0 Medium  2014 - Present    Chronic anticoagulation Z79.01   2014 - Present    Gout M10.9   2014 - Present    Melena K92.1   2016 - " Present    Syncope R55 [facility]  8/9/2016 - Present    Anemia D64.9 High  8/10/2016 - Present      Health Maintenance        Date Due Completion Dates    IMM DTaP/Tdap/Td Vaccine (1 - Tdap) 4/7/1968 ---    COLONOSCOPY 4/7/1999 ---    IMM ZOSTER VACCINE 4/7/2009 ---    IMM PNEUMOCOCCAL 65+ (ADULT) LOW/MEDIUM RISK SERIES (1 of 2 - PCV13) 4/7/2014 ---    IMM INFLUENZA (1) 9/1/2017 ---            Current Immunizations     No immunizations on file.      Below and/or attached are the medications your provider expects you to take. Review all of your home medications and newly ordered medications with your provider and/or pharmacist. Follow medication instructions as directed by your provider and/or pharmacist. Please keep your medication list with you and share with your provider. Update the information when medications are discontinued, doses are changed, or new medications (including over-the-counter products) are added; and carry medication information at all times in the event of emergency situations     Allergies:  NO KNOWN DRUG ALLERGY - (reactions not documented)               Medications  Valid as of: August 02, 2017 -  4:26 PM    Generic Name Brand Name Tablet Size Instructions for use    Apixaban (Tab) ELIQUIS 5mg Take 1 Tab by mouth 2 Times a Day.        Colchicine (Tab) COLCRYS 0.6 MG TAKE 2 TABS AT SIGNS OF GOUT ATTACK, THEN 1 TAB ONE HOUR AFTER FIRST DOSE. DO NOT REPEAT EARLIER THAN 3 DAYS.        Hydrocodone-Acetaminophen (Tab) NORCO 5-325 MG 1 TAB EVERY 6 HOURS ONLY IF NEEDED FOR PAIN. MAY CAUSE DROWSINESS.        MethylPREDNISolone (Tablet Therapy Pack) MEDROL DOSEPAK 4 MG TAKE AS DIRECTED ON PACKAGE.        MethylPREDNISolone (Tablet Therapy Pack) MEDROL DOSEPAK 4 MG Take 1 Tab by mouth every day.        MethylPREDNISolone (Tablet Therapy Pack) MEDROL DOSEPAK 4 MG UAD        MethylPREDNISolone (Tablet Therapy Pack) MEDROL DOSEPAK 4 MG Take 1 Tab by mouth every day.        Metoprolol Tartrate (Tab) LOPRESSOR  50 MG Take 1 Tab by mouth 2 Times a Day.        Oxycodone-Acetaminophen (Tab) PERCOCET 5-325 MG Take 1-2 Tabs by mouth every 6 hours as needed for Severe Pain.        Oxycodone-Acetaminophen (Tab) PERCOCET 5-325 MG Take 1-2 Tabs by mouth every 6 hours as needed.        Pantoprazole Sodium (Tablet Delayed Response) PROTONIX 40 MG Take 1 Tab by mouth 2 times a day.        Potassium Chloride Oksana CR (Tab CR) Kdur 20 MEQ Take 2 Tabs by mouth every day.        Pravastatin Sodium (Tab) PRAVACHOL 40 MG Take 40 mg by mouth every evening.        Pravastatin Sodium (Tab) PRAVACHOL 40 MG TAKE 1 TABLET BY MOUTH IN THE EVENING        PredniSONE (Tab) DELTASONE 10 MG Take 1 tablet 3 times daily for 5 days        Verapamil HCl (Tab CR) CALAN- MG Take 180 mg by mouth every evening.        .                 Medicines prescribed today were sent to:     [facility]/PHARMACY #[id number] - [place], [place] - [address]    [address] [place] [address]    Phone: [phone] Fax: [phone]    Open 24 Hours?: No      Medication refill instructions:       If your prescription bottle indicates you have medication refills left, it is not necessary to call your provider’s office. Please contact your pharmacy and they will refill your medication.    If your prescription bottle indicates you do not have any refills left, you may request refills at any time through one of the following ways: The online [employer] system (except Urgent Care), by calling your provider’s office, or by asking your pharmacy to contact your provider’s office with a refill request. Medication refills are processed only during regular business hours and may not be available until the next business day. Your provider may request additional information or to have a follow-up visit with you prior to refilling your medication.   *Please Note: Medication refills are assigned a new Rx number when refilled electronically. Your pharmacy may indicate that no refills were authorized  even though a new prescription for the same medication is available at the pharmacy. Please request the medicine by name with the pharmacy before contacting your provider for a refill.           MyChart Access Code: Activation code not generated  Current [employer] Status: Active

## 2017-08-10 ENCOUNTER — OFFICE VISIT (OUTPATIENT)
Dept: URGENT CARE | Facility: PHYSICIAN GROUP | Age: 67
End: 2017-08-10
Payer: COMMERCIAL

## 2017-08-10 VITALS
WEIGHT: 234 LBS | DIASTOLIC BLOOD PRESSURE: 82 MMHG | HEART RATE: 79 BPM | RESPIRATION RATE: 16 BRPM | OXYGEN SATURATION: 96 % | BODY MASS INDEX: 32.76 KG/M2 | TEMPERATURE: 98.2 F | SYSTOLIC BLOOD PRESSURE: 138 MMHG | HEIGHT: 71 IN

## 2017-08-10 DIAGNOSIS — M10.9 ACUTE GOUT INVOLVING TOE OF RIGHT FOOT, UNSPECIFIED CAUSE: ICD-10-CM

## 2017-08-10 PROCEDURE — 99214 OFFICE O/P EST MOD 30 MIN: CPT | Performed by: FAMILY MEDICINE

## 2017-08-10 RX ORDER — INDOMETHACIN 50 MG/1
50 CAPSULE ORAL 3 TIMES DAILY
Qty: 90 CAP | Refills: 0 | Status: SHIPPED | OUTPATIENT
Start: 2017-08-10 | End: 2019-03-16

## 2017-08-10 ASSESSMENT — ENCOUNTER SYMPTOMS
NUMBNESS: 0
WEAKNESS: 0
FEVER: 0
CHILLS: 0

## 2017-08-10 NOTE — PATIENT INSTRUCTIONS
Gout  Gout is an inflammatory arthritis caused by a buildup of uric acid crystals in the joints. Uric acid is a chemical that is normally present in the blood. When the level of uric acid in the blood is too high it can form crystals that deposit in your joints and tissues. This causes joint redness, soreness, and swelling (inflammation). Repeat attacks are common. Over time, uric acid crystals can form into masses (tophi) near a joint, destroying bone and causing disfigurement. Gout is treatable and often preventable.  CAUSES   The disease begins with elevated levels of uric acid in the blood. Uric acid is produced by your body when it breaks down a naturally found substance called purines. Certain foods you eat, such as meats and fish, contain high amounts of purines. Causes of an elevated uric acid level include:  · Being passed down from parent to child (heredity).  · Diseases that cause increased uric acid production (such as obesity, psoriasis, and certain cancers).  · Excessive alcohol use.  · Diet, especially diets rich in meat and seafood.  · Medicines, including certain cancer-fighting medicines (chemotherapy), water pills (diuretics), and aspirin.  · Chronic kidney disease. The kidneys are no longer able to remove uric acid well.  · Problems with metabolism.  Conditions strongly associated with gout include:  · Obesity.  · High blood pressure.  · High cholesterol.  · Diabetes.  Not everyone with elevated uric acid levels gets gout. It is not understood why some people get gout and others do not. Surgery, joint injury, and eating too much of certain foods are some of the factors that can lead to gout attacks.  SYMPTOMS   · An attack of gout comes on quickly. It causes intense pain with redness, swelling, and warmth in a joint.  · Fever can occur.  · Often, only one joint is involved. Certain joints are more commonly involved:  ¨ Base of the big toe.  ¨ Knee.  ¨ Ankle.  ¨ Wrist.  ¨ Finger.  Without  treatment, an attack usually goes away in a few days to weeks. Between attacks, you usually will not have symptoms, which is different from many other forms of arthritis.  DIAGNOSIS   Your caregiver will suspect gout based on your symptoms and exam. In some cases, tests may be recommended. The tests may include:  · Blood tests.  · Urine tests.  · X-rays.  · Joint fluid exam. This exam requires a needle to remove fluid from the joint (arthrocentesis). Using a microscope, gout is confirmed when uric acid crystals are seen in the joint fluid.  TREATMENT   There are two phases to gout treatment: treating the sudden onset (acute) attack and preventing attacks (prophylaxis).  · Treatment of an Acute Attack.  ¨ Medicines are used. These include anti-inflammatory medicines or steroid medicines.  ¨ An injection of steroid medicine into the affected joint is sometimes necessary.  ¨ The painful joint is rested. Movement can worsen the arthritis.  ¨ You may use warm or cold treatments on painful joints, depending which works best for you.  · Treatment to Prevent Attacks.  ¨ If you suffer from frequent gout attacks, your caregiver may advise preventive medicine. These medicines are started after the acute attack subsides. These medicines either help your kidneys eliminate uric acid from your body or decrease your uric acid production. You may need to stay on these medicines for a very long time.  ¨ The early phase of treatment with preventive medicine can be associated with an increase in acute gout attacks. For this reason, during the first few months of treatment, your caregiver may also advise you to take medicines usually used for acute gout treatment. Be sure you understand your caregiver's directions. Your caregiver may make several adjustments to your medicine dose before these medicines are effective.  ¨ Discuss dietary treatment with your caregiver or dietitian. Alcohol and drinks high in sugar and fructose and foods  such as meat, poultry, and seafood can increase uric acid levels. Your caregiver or dietitian can advise you on drinks and foods that should be limited.  HOME CARE INSTRUCTIONS   · Do not take aspirin to relieve pain. This raises uric acid levels.  · Only take over-the-counter or prescription medicines for pain, discomfort, or fever as directed by your caregiver.  · Rest the joint as much as possible. When in bed, keep sheets and blankets off painful areas.  · Keep the affected joint raised (elevated).  · Apply warm or cold treatments to painful joints. Use of warm or cold treatments depends on which works best for you.  · Use crutches if the painful joint is in your leg.  · Drink enough fluids to keep your urine clear or pale yellow. This helps your body get rid of uric acid. Limit alcohol, sugary drinks, and fructose drinks.  · Follow your dietary instructions. Pay careful attention to the amount of protein you eat. Your daily diet should emphasize fruits, vegetables, whole grains, and fat-free or low-fat milk products. Discuss the use of coffee, vitamin C, and cherries with your caregiver or dietitian. These may be helpful in lowering uric acid levels.  · Maintain a healthy body weight.  SEEK MEDICAL CARE IF:   · You develop diarrhea, vomiting, or any side effects from medicines.  · You do not feel better in 24 hours, or you are getting worse.  SEEK IMMEDIATE MEDICAL CARE IF:   · Your joint becomes suddenly more tender, and you have chills or a fever.  MAKE SURE YOU:   · Understand these instructions.  · Will watch your condition.  · Will get help right away if you are not doing well or get worse.     This information is not intended to replace advice given to you by your health care provider. Make sure you discuss any questions you have with your health care provider.     Document Released: 12/15/2001 Document Revised: 01/08/2016 Document Reviewed: 07/31/2013  [employer] Interactive Patient Education ©2016  [employer]

## 2017-08-10 NOTE — PROGRESS NOTES
"Subjective:      Kip Harper is a 67 y.o. male who presents with Foot Swelling            Foot Swelling  This is a new problem. The current episode started in the past 7 days. The problem occurs constantly. The problem has been rapidly worsening. Pertinent negatives include no chills, fever, numbness or weakness.       Review of Systems   Constitutional: Negative for fever and chills.   Neurological: Negative for weakness and numbness.     Allergies   Allergen Reactions   • No Known Drug Allergy          Objective:     /82 mmHg  Pulse 79  Temp(Src) 36.8 °C (98.2 °F)  Resp 16  Ht 1.803 m (5' 10.98\")  Wt 106.142 kg (234 lb)  BMI 32.65 kg/m2  SpO2 96%     Physical Exam   Constitutional: He is oriented to person, place, and time. He appears well-developed and well-nourished. No distress.   HENT:   Head: Normocephalic and atraumatic.   Eyes: Conjunctivae and EOM are normal. Pupils are equal, round, and reactive to light.   Cardiovascular: Normal rate and regular rhythm.    No murmur heard.  Pulmonary/Chest: Effort normal and breath sounds normal. No respiratory distress.   Abdominal: Soft. He exhibits no distension. There is no tenderness.   Musculoskeletal:        Right foot: There is decreased range of motion, tenderness, bony tenderness and swelling (with erythema, no flucutance).   Neurological: He is alert and oriented to person, place, and time. He has normal reflexes. No sensory deficit.   Skin: Skin is warm and dry.   Psychiatric: He has a normal mood and affect.               Assessment/Plan:     1. Acute gout involving toe of right foot, unspecified cause  Differential diagnosis, natural history, supportive care, and indications for immediate follow-up discussed.   - indomethacin (INDOCIN) 50 MG Cap; Take 1 Cap by mouth 3 times a day.  Dispense: 90 Cap; Refill: 0        "

## 2017-08-10 NOTE — MR AVS SNAPSHOT
"        Kip ALE Leroy   8/10/2017 10:00 AM   Office Visit   MRN: [id number]    Department:  [facility]   Dept Phone:  [phone]    Description:  Male : 1950   Provider:  Sai Winston M.D.           Reason for Visit     Foot Swelling redness x 3 days      Allergies as of 8/10/2017     Allergen Noted Reactions    No Known Drug Allergy 2012         You were diagnosed with     Acute gout involving toe of right foot, unspecified cause   [[id number]]         Vital Signs     Blood Pressure Pulse Temperature Respirations Height Weight    138/82 mmHg 79 36.8 °C (98.2 °F) 16 1.803 m (5' 10.98\") 106.142 kg (234 lb)    Body Mass Index Oxygen Saturation Smoking Status             32.65 kg/m2 96% Never Smoker          Basic Information     Date Of Birth Sex Race Ethnicity Preferred Language    1950 Male  or   Origin (Sami,Serbian,Taiwanese,Rufus, etc) English      Your appointments     Oct 13, 2017  8:20 AM   FOLLOW UP with Ct Hess M.D.   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 R76.11   Unknown - Present    GOUT  High  3/15/2012 - Present    Vision loss of right eye H54.61   2014 - Present    Atrial fibrillation (CMS-HCC) I48.91   2014 - Present    Essential hypertension, benign I10 Medium  2014 - Present    Mitral regurgitation I34.0 Medium  2014 - Present    Chronic anticoagulation Z79.01   2014 - Present    Gout M10.9   2014 - Present    Melena K92.1   2016 - Present    Syncope " R55 [facility]  8/9/2016 - Present    Anemia D64.9 High  8/10/2016 - Present      Health Maintenance        Date Due Completion Dates    IMM DTaP/Tdap/Td Vaccine (1 - Tdap) 4/7/1968 ---    COLONOSCOPY 4/7/1999 ---    IMM ZOSTER VACCINE 4/7/2009 ---    IMM PNEUMOCOCCAL 65+ (ADULT) LOW/MEDIUM RISK SERIES (1 of 2 - PCV13) 4/7/2014 ---    IMM INFLUENZA (1) 9/1/2017 ---            Current Immunizations     No immunizations on file.      Below and/or attached are the medications your provider expects you to take. Review all of your home medications and newly ordered medications with your provider and/or pharmacist. Follow medication instructions as directed by your provider and/or pharmacist. Please keep your medication list with you and share with your provider. Update the information when medications are discontinued, doses are changed, or new medications (including over-the-counter products) are added; and carry medication information at all times in the event of emergency situations     Allergies:  NO KNOWN DRUG ALLERGY - (reactions not documented)               Medications  Valid as of: August 10, 2017 - 10:12 AM    Generic Name Brand Name Tablet Size Instructions for use    Apixaban (Tab) ELIQUIS 5mg Take 1 Tab by mouth 2 Times a Day.        Colchicine (Tab) COLCRYS 0.6 MG TAKE 2 TABS AT SIGNS OF GOUT ATTACK, THEN 1 TAB ONE HOUR AFTER FIRST DOSE. DO NOT REPEAT EARLIER THAN 3 DAYS.        Hydrocodone-Acetaminophen (Tab) NORCO 5-325 MG 1 TAB EVERY 6 HOURS ONLY IF NEEDED FOR PAIN. MAY CAUSE DROWSINESS.        Indomethacin (Cap) INDOCIN 50 MG Take 1 Cap by mouth 3 times a day.        MethylPREDNISolone (Tablet Therapy Pack) MEDROL DOSEPAK 4 MG TAKE AS DIRECTED ON PACKAGE.        MethylPREDNISolone (Tablet Therapy Pack) MEDROL DOSEPAK 4 MG Take 1 Tab by mouth every day.        MethylPREDNISolone (Tablet Therapy Pack) MEDROL DOSEPAK 4 MG UAD        MethylPREDNISolone (Tablet Therapy Pack) MEDROL DOSEPAK 4 MG Take 1 Tab by mouth  every day.        Metoprolol Tartrate (Tab) LOPRESSOR 50 MG Take 1 Tab by mouth 2 Times a Day.        Oxycodone-Acetaminophen (Tab) PERCOCET 5-325 MG Take 1-2 Tabs by mouth every 6 hours as needed for Severe Pain.        Oxycodone-Acetaminophen (Tab) PERCOCET 5-325 MG Take 1-2 Tabs by mouth every 6 hours as needed.        Pantoprazole Sodium (Tablet Delayed Response) PROTONIX 40 MG Take 1 Tab by mouth 2 times a day.        Potassium Chloride Oksana CR (Tab CR) Kdur 20 MEQ Take 2 Tabs by mouth every day.        Pravastatin Sodium (Tab) PRAVACHOL 40 MG Take 40 mg by mouth every evening.        Pravastatin Sodium (Tab) PRAVACHOL 40 MG TAKE 1 TABLET BY MOUTH IN THE EVENING        PredniSONE (Tab) DELTASONE 10 MG Take 1 tablet 3 times daily for 5 days        Verapamil HCl (Tab CR) CALAN- MG Take 180 mg by mouth every evening.        .                 Medicines prescribed today were sent to:     [facility]/PHARM[facility]Y #[id number] - [place], [place] - [address] [facility].    [address] [place] [place] [address]    Phone: [phone] Fax: [phone]    Open 24 Hours?: No      Medication refill instructions:       If your prescription bottle indicates you have medication refills left, it is not necessary to call your provider’s office. Please contact your pharmacy and they will refill your medication.    If your prescription bottle indicates you do not have any refills left, you may request refills at any time through one of the following ways: The online [employer] system (except Urgent Care), by calling your provider’s office, or by asking your pharmacy to contact your provider’s office with a refill request. Medication refills are processed only during regular business hours and may not be available until the next business day. Your provider may request additional information or to have a follow-up visit with you prior to refilling your medication.   *Please Note: Medication refills are assigned a new Rx number when refilled electronically. Your  pharmacy may indicate that no refills were authorized even though a new prescription for the same medication is available at the pharmacy. Please request the medicine by name with the pharmacy before contacting your provider for a refill.        Instructions    Gout  Gout is an inflammatory arthritis caused by a buildup of uric acid crystals in the joints. Uric acid is a chemical that is normally present in the blood. When the level of uric acid in the blood is too high it can form crystals that deposit in your joints and tissues. This causes joint redness, soreness, and swelling (inflammation). Repeat attacks are common. Over time, uric acid crystals can form into masses (tophi) near a joint, destroying bone and causing disfigurement. Gout is treatable and often preventable.  CAUSES   The disease begins with elevated levels of uric acid in the blood. Uric acid is produced by your body when it breaks down a naturally found substance called purines. Certain foods you eat, such as meats and fish, contain high amounts of purines. Causes of an elevated uric acid level include:  · Being passed down from parent to child (heredity).  · Diseases that cause increased uric acid production (such as obesity, psoriasis, and certain cancers).  · Excessive alcohol use.  · Diet, especially diets rich in meat and seafood.  · Medicines, including certain cancer-fighting medicines (chemotherapy), water pills (diuretics), and aspirin.  · Chronic kidney disease. The kidneys are no longer able to remove uric acid well.  · Problems with metabolism.  Conditions strongly associated with gout include:  · Obesity.  · High blood pressure.  · High cholesterol.  · Diabetes.  Not everyone with elevated uric acid levels gets gout. It is not understood why some people get gout and others do not. Surgery, joint injury, and eating too much of certain foods are some of the factors that can lead to gout attacks.  SYMPTOMS   · An attack of gout comes on  quickly. It causes intense pain with redness, swelling, and warmth in a joint.  · Fever can occur.  · Often, only one joint is involved. Certain joints are more commonly involved:  ¨ Base of the big toe.  ¨ Knee.  ¨ Ankle.  ¨ Wrist.  ¨ Finger.  Without treatment, an attack usually goes away in a few days to weeks. Between attacks, you usually will not have symptoms, which is different from many other forms of arthritis.  DIAGNOSIS   Your caregiver will suspect gout based on your symptoms and exam. In some cases, tests may be recommended. The tests may include:  · Blood tests.  · Urine tests.  · X-rays.  · Joint fluid exam. This exam requires a needle to remove fluid from the joint (arthrocentesis). Using a microscope, gout is confirmed when uric acid crystals are seen in the joint fluid.  TREATMENT   There are two phases to gout treatment: treating the sudden onset (acute) attack and preventing attacks (prophylaxis).  · Treatment of an Acute Attack.  ¨ Medicines are used. These include anti-inflammatory medicines or steroid medicines.  ¨ An injection of steroid medicine into the affected joint is sometimes necessary.  ¨ The painful joint is rested. Movement can worsen the arthritis.  ¨ You may use warm or cold treatments on painful joints, depending which works best for you.  · Treatment to Prevent Attacks.  ¨ If you suffer from frequent gout attacks, your caregiver may advise preventive medicine. These medicines are started after the acute attack subsides. These medicines either help your kidneys eliminate uric acid from your body or decrease your uric acid production. You may need to stay on these medicines for a very long time.  ¨ The early phase of treatment with preventive medicine can be associated with an increase in acute gout attacks. For this reason, during the first few months of treatment, your caregiver may also advise you to take medicines usually used for acute gout treatment. Be sure you understand  your caregiver's directions. Your caregiver may make several adjustments to your medicine dose before these medicines are effective.  ¨ Discuss dietary treatment with your caregiver or dietitian. Alcohol and drinks high in sugar and fructose and foods such as meat, poultry, and seafood can increase uric acid levels. Your caregiver or dietitian can advise you on drinks and foods that should be limited.  HOME CARE INSTRUCTIONS   · Do not take aspirin to relieve pain. This raises uric acid levels.  · Only take over-the-counter or prescription medicines for pain, discomfort, or fever as directed by your caregiver.  · Rest the joint as much as possible. When in bed, keep sheets and blankets off painful areas.  · Keep the affected joint raised (elevated).  · Apply warm or cold treatments to painful joints. Use of warm or cold treatments depends on which works best for you.  · Use crutches if the painful joint is in your leg.  · Drink enough fluids to keep your urine clear or pale yellow. This helps your body get rid of uric acid. Limit alcohol, sugary drinks, and fructose drinks.  · Follow your dietary instructions. Pay careful attention to the amount of protein you eat. Your daily diet should emphasize fruits, vegetables, whole grains, and fat-free or low-fat milk products. Discuss the use of coffee, vitamin C, and cherries with your caregiver or dietitian. These may be helpful in lowering uric acid levels.  · Maintain a healthy body weight.  SEEK MEDICAL CARE IF:   · You develop diarrhea, vomiting, or any side effects from medicines.  · You do not feel better in 24 hours, or you are getting worse.  SEEK IMMEDIATE MEDICAL CARE IF:   · Your joint becomes suddenly more tender, and you have chills or a fever.  MAKE SURE YOU:   · Understand these instructions.  · Will watch your condition.  · Will get help right away if you are not doing well or get worse.     This information is not intended to replace advice given to you  by your health care provider. Make sure you discuss any questions you have with your health care provider.     Document Released: 12/15/2001 Document Revised: 01/08/2016 Document Reviewed: 07/31/2013  [employer] Interactive Patient Education ©2016 "[employer]            MyChart Access Code: Activation code not generated  Current [facility] Status: Active

## 2017-08-21 ENCOUNTER — TELEPHONE (OUTPATIENT)
Dept: CARDIOLOGY | Facility: MEDICAL CENTER | Age: 67
End: 2017-08-21

## 2017-08-21 DIAGNOSIS — I48.0 PAROXYSMAL ATRIAL FIBRILLATION (HCC): ICD-10-CM

## 2017-08-21 NOTE — TELEPHONE ENCOUNTER
----- Message from Ting Stockton sent at 8/21/2017 11:23 AM PDT -----  Regarding: samples of eliquis  Contact: [phone]  SONIA/nadiya    Pt calling for samples of apixaban (ELIQUIS) 5mg, to get him thru until insurance pays again on Sept. 2nd.  Please call pt at

## 2017-10-13 ENCOUNTER — OFFICE VISIT (OUTPATIENT)
Dept: CARDIOLOGY | Facility: MEDICAL CENTER | Age: 67
End: 2017-10-13
Payer: COMMERCIAL

## 2017-10-13 VITALS
HEIGHT: 71 IN | HEART RATE: 56 BPM | RESPIRATION RATE: 14 BRPM | WEIGHT: 240 LBS | BODY MASS INDEX: 33.6 KG/M2 | DIASTOLIC BLOOD PRESSURE: 80 MMHG | OXYGEN SATURATION: 96 % | SYSTOLIC BLOOD PRESSURE: 140 MMHG

## 2017-10-13 DIAGNOSIS — E66.01 MORBID OBESITY DUE TO EXCESS CALORIES (HCC): ICD-10-CM

## 2017-10-13 DIAGNOSIS — I10 ESSENTIAL HYPERTENSION: ICD-10-CM

## 2017-10-13 DIAGNOSIS — Z79.01 CHRONIC ANTICOAGULATION: ICD-10-CM

## 2017-10-13 DIAGNOSIS — I34.0 NON-RHEUMATIC MITRAL REGURGITATION: ICD-10-CM

## 2017-10-13 DIAGNOSIS — E78.2 MIXED HYPERLIPIDEMIA: ICD-10-CM

## 2017-10-13 DIAGNOSIS — I48.0 PAROXYSMAL ATRIAL FIBRILLATION (HCC): ICD-10-CM

## 2017-10-13 PROCEDURE — 99214 OFFICE O/P EST MOD 30 MIN: CPT | Performed by: INTERNAL MEDICINE

## 2017-10-13 RX ORDER — ALLOPURINOL 100 MG/1
100 TABLET ORAL
Refills: 11 | COMMUNITY
Start: 2017-10-07 | End: 2021-01-20 | Stop reason: SDUPTHER

## 2017-10-13 RX ORDER — FUROSEMIDE 20 MG/1
20 TABLET ORAL DAILY
Qty: 30 TAB | Refills: 5 | Status: SHIPPED | OUTPATIENT
Start: 2017-10-13 | End: 2019-03-16

## 2017-10-13 ASSESSMENT — ENCOUNTER SYMPTOMS
NERVOUS/ANXIOUS: 0
BLURRED VISION: 0
SHORTNESS OF BREATH: 1
COUGH: 0
NAUSEA: 0
CHILLS: 0
ABDOMINAL PAIN: 0
WHEEZING: 0
LOSS OF CONSCIOUSNESS: 0
FEVER: 0
MYALGIAS: 0
VOMITING: 0
BRUISES/BLEEDS EASILY: 0
SPEECH CHANGE: 0
EYE DISCHARGE: 0
PALPITATIONS: 0
BLOOD IN STOOL: 0
HEMOPTYSIS: 0
EYE PAIN: 0
DEPRESSION: 0

## 2017-10-13 NOTE — PROGRESS NOTES
Subjective:   Kip Harper is a 67 y.o. male who presents today for f/u AF, HTN, anticoagulation and mitral regurgitation    Lately has been getting dyspnea on moderate exertion.  No CP or palpitations.  His sister also thinks that he is retaining fluid.  No bleeding from apixaban.  Some issue with gout    Last ECHO 2017.  Last CMP and lipds in 4/2017 in the range    Past Medical History:   Diagnosis Date   • Hyperlipidemia    • Hypertensive heart disease    • Hypertensive heart disease    • Obesity    • Paroxysmal atrial fibrillation (CMS-HCC)    • Sleep apnea    • 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      Past Surgical History:   Procedure Laterality Date   • GASTROSCOPY-ENDO  8/11/2016    Procedure: GASTROSCOPY-ENDO;  Surgeon: Jones Moyer M.D.;  Location: ENDOSCOPY [facility];  Service:      Family History   Problem Relation Age of Onset   • Heart Disease Mother    • Diabetes Mother    • Heart Disease Father    • Lung Disease Father      History   Smoking Status   • Never Smoker   Smokeless Tobacco   • Never Used     Allergies   Allergen Reactions   • No Known Drug Allergy      Outpatient Encounter Prescriptions as of 10/13/2017   Medication Sig Dispense Refill   • allopurinol (ZYLOPRIM) 100 MG Tab Take 100 mg by mouth every day.  11   • verapamil ER (CALAN-SR) 180 MG Tab CR TAKE 1 TABLET BY MOUTH EVERY DAY 90 Tab 3   • apixaban (ELIQUIS) 5mg Tab Take 1 Tab by mouth 2 Times a Day. 60 Tab 0   • metoprolol (LOPRESSOR) 50 MG Tab Take 1 Tab by mouth 2 Times a Day. 60 Tab 11   • pravastatin (PRAVACHOL) 40 MG tablet Take 40 mg by mouth every evening.     • indomethacin (INDOCIN) 50 MG Cap Take 1 Cap by mouth 3 times a day. (Patient not taking: Reported on 10/13/2017) 90 Cap 0   • MethylPREDNISolone (MEDROL DOSEPAK) 4 MG Tablet Therapy Pack Take 1 Tab by mouth every day. (Patient not taking: Reported on 8/10/2017) 1 Kit 0   • oxycodone-acetaminophen (PERCOCET) 5-325 MG Tab Take 1-2 Tabs by mouth every 6 hours  as needed. (Patient not taking: Reported on 8/10/2017) 10 Tab 0   • MethylPREDNISolone (MEDROL DOSEPAK) 4 MG Tablet Therapy Pack UAD (Patient not taking: Reported on 10/13/2017) 1 Kit 0   • oxycodone-acetaminophen (PERCOCET) 5-325 MG Tab Take 1-2 Tabs by mouth every 6 hours as needed for Severe Pain. (Patient not taking: Reported on 10/13/2017) 15 Tab 0   • predniSONE (DELTASONE) 10 MG Tab Take 1 tablet 3 times daily for 5 days (Patient not taking: Reported on 10/13/2017) 15 Tab 1   • MethylPREDNISolone (MEDROL DOSEPAK) 4 MG Tablet Therapy Pack Take 1 Tab by mouth every day. (Patient not taking: Reported on 10/13/2017) 1 Kit 0   • pravastatin (PRAVACHOL) 40 MG tablet TAKE 1 TABLET BY MOUTH IN THE EVENING (Patient not taking: Reported on 10/13/2017) 90 Tab 3   • MethylPREDNISolone (MEDROL DOSEPAK) 4 MG Tablet Therapy Pack TAKE AS DIRECTED ON PACKAGE. (Patient not taking: Reported on 10/13/2017) 21 Tab 0   • colchicine (COLCRYS) 0.6 MG Tab TAKE 2 TABS AT SIGNS OF GOUT ATTACK, THEN 1 TAB ONE HOUR AFTER FIRST DOSE. DO NOT REPEAT EARLIER THAN 3 DAYS. (Patient not taking: Reported on 10/13/2017) 30 Tab 0   • hydrocodone-acetaminophen (NORCO) 5-325 MG Tab per tablet 1 TAB EVERY 6 HOURS ONLY IF NEEDED FOR PAIN. MAY CAUSE DROWSINESS. (Patient not taking: Reported on 10/13/2017) 28 Tab 0   • potassium chloride SA (K-DUR) 20 MEQ Tab CR Take 2 Tabs by mouth every day. (Patient not taking: Reported on 10/13/2017) 60 Tab 1   • pantoprazole (PROTONIX) 40 MG Tablet Delayed Response Take 1 Tab by mouth 2 times a day. (Patient not taking: Reported on 10/13/2017) 60 Tab 6   • verapamil ER (CALAN-SR) 180 MG Tab CR Take 180 mg by mouth every evening.       No facility-administered encounter medications on file as of 10/13/2017.      Review of Systems   Constitutional: Negative for chills and fever.   HENT: Negative for congestion.    Eyes: Negative for blurred vision, pain and discharge.   Respiratory: Positive for shortness of breath.  "Negative for cough, hemoptysis and wheezing.    Cardiovascular: Positive for leg swelling. Negative for chest pain and palpitations.   Gastrointestinal: Negative for abdominal pain, blood in stool, nausea and vomiting.   Genitourinary: Positive for frequency.   Musculoskeletal: Positive for joint pain. Negative for myalgias.   Skin: Negative for itching and rash.   Neurological: Negative for speech change and loss of consciousness.   Endo/Heme/Allergies: Does not bruise/bleed easily.   Psychiatric/Behavioral: Negative for depression. The patient is not nervous/anxious.    All other systems reviewed and are negative.       Objective:   /80   Pulse (!) 56   Resp 14   Ht 1.803 m (5' 11\")   Wt 108.9 kg (240 lb)   SpO2 96%   BMI 33.47 kg/m²     Physical Exam   Constitutional: He is oriented to person, place, and time. No distress.   Obese   HENT:   Mouth/Throat: Mucous membranes are normal.   Eyes: Conjunctivae and EOM are normal.   Neck: No JVD present. No tracheal deviation present. No thyroid mass and no thyromegaly present.   Cardiovascular: Normal rate and intact distal pulses.  An irregularly irregular rhythm present. Exam reveals distant heart sounds.    Murmur heard.   Systolic murmur is present with a grade of 2/6   Pulmonary/Chest: Effort normal and breath sounds normal. No respiratory distress. He exhibits no tenderness.   Abdominal: Soft. There is no tenderness.   Musculoskeletal: He exhibits edema.   Trace both legs  Swollen joints in his right hand   Neurological: He is alert and oriented to person, place, and time. He has normal strength. He displays no tremor.   Skin: Skin is warm and dry. He is not diaphoretic.   Psychiatric: He has a normal mood and affect. His behavior is normal.   Vitals reviewed.      Assessment:     1. Paroxysmal atrial fibrillation (CMS-[facility])  ECHOCARDIOGRAM COMP W/O CONT   2. Essential hypertension  COMP METABOLIC PANEL   3. Chronic anticoagulation     4. Morbid obesity " due to excess calories (CMS-HCC)     5. Mixed hyperlipidemia  COMP METABOLIC PANEL    LIPID PROFILE   6. Non-rheumatic mitral regurgitation  ECHOCARDIOGRAM COMP W/O CONT   7.      Dyspnea on exertion    Medical Decision Making:  Today's Assessment / Status / Plan:     With recent dyspnea on exertion, will reassess MR and LV function with ECHO.  Some of the edema could be from NSAIDs  Will also obtain BNP, CMP and lipids.  Will add furosemide 20 mg/d. Advised to avoid salty food.  Will continue current medications and have the patient return for a followup in 3 months.   Will be happy to see the patient sooner as needed. Thank you for allowing me to participate in the caring of this patient.

## 2017-10-13 NOTE — LETTER
[facility]   [address], [address]  [place] [address]  Phone: [phone]  Fax: [phone]              Kip Harper  1950    Encounter Date: 10/13/2017    Ct Hess M.D.          PROGRESS NOTE:  Subjective:   Kip Harper is a 67 y.o. 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      Past Surgical History:   Procedure Laterality Date   • GASTROSCOPY-ENDO  8/11/2016    Procedure: GASTROSCOPY-ENDO;  Surgeon: Jones Moyer M.D.;  Location: [facility];  Service:      Family History   Problem Relation Age of Onset   • Heart Disease Mother    • Diabetes Mother    • Heart Disease Father    • Lung Disease Father      History   Smoking Status   • Never Smoker   Smokeless Tobacco   • Never Used     Allergies   Allergen Reactions   • No Known Drug Allergy      Outpatient Encounter Prescriptions as of 10/13/2017   Medication Sig Dispense Refill   • allopurinol (ZYLOPRIM) 100 MG Tab Take 100 mg by mouth every day.  11   • verapamil ER (CALAN-SR) 180 MG Tab CR TAKE 1 TABLET BY MOUTH EVERY DAY 90 Tab 3   • apixaban (ELIQUIS) 5mg Tab Take 1 Tab by mouth 2 Times a Day. 60 Tab 0   • metoprolol (LOPRESSOR) 50 MG Tab Take 1 Tab by mouth 2 Times a Day. 60 Tab 11   • pravastatin (PRAVACHOL) 40 MG tablet Take 40 mg by mouth every evening.     • indomethacin (INDOCIN) 50 MG Cap Take 1 Cap by mouth 3 times a day. (Patient not taking: Reported on 10/13/2017) 90 Cap 0   • MethylPREDNISolone (MEDROL DOSEPAK) 4 MG Tablet Therapy Pack Take 1 Tab by mouth every day. (Patient not taking: Reported on 8/10/2017) 1 Kit 0   • oxycodone-acetaminophen (PERCOCET) 5-325 MG Tab Take 1-2 Tabs by mouth every 6 hours as needed. (Patient not taking:  Reported on 8/10/2017) 10 Tab 0   • MethylPREDNISolone (MEDROL DOSEPAK) 4 MG Tablet Therapy Pack UAD (Patient not taking: Reported on 10/13/2017) 1 Kit 0   • oxycodone-acetaminophen (PERCOCET) 5-325 MG Tab Take 1-2 Tabs by mouth every 6 hours as needed for Severe Pain. (Patient not taking: Reported on 10/13/2017) 15 Tab 0   • predniSONE (DELTASONE) 10 MG Tab Take 1 tablet 3 times daily for 5 days (Patient not taking: Reported on 10/13/2017) 15 Tab 1   • MethylPREDNISolone (MEDROL DOSEPAK) 4 MG Tablet Therapy Pack Take 1 Tab by mouth every day. (Patient not taking: Reported on 10/13/2017) 1 Kit 0   • pravastatin (PRAVACHOL) 40 MG tablet TAKE 1 TABLET BY MOUTH IN THE EVENING (Patient not taking: Reported on 10/13/2017) 90 Tab 3   • MethylPREDNISolone (MEDROL DOSEPAK) 4 MG Tablet Therapy Pack TAKE AS DIRECTED ON PACKAGE. (Patient not taking: Reported on 10/13/2017) 21 Tab 0   • colchicine (COLCRYS) 0.6 MG Tab TAKE 2 TABS AT SIGNS OF GOUT ATTACK, THEN 1 TAB ONE HOUR AFTER FIRST DOSE. DO NOT REPEAT EARLIER THAN 3 DAYS. (Patient not taking: Reported on 10/13/2017) 30 Tab 0   • hydrocodone-acetaminophen (NORCO) 5-325 MG Tab per tablet 1 TAB EVERY 6 HOURS ONLY IF NEEDED FOR PAIN. MAY CAUSE DROWSINESS. (Patient not taking: Reported on 10/13/2017) 28 Tab 0   • potassium chloride SA (K-DUR) 20 MEQ Tab CR Take 2 Tabs by mouth every day. (Patient not taking: Reported on 10/13/2017) 60 Tab 1   • pantoprazole (PROTONIX) 40 MG Tablet Delayed Response Take 1 Tab by mouth 2 times a day. (Patient not taking: Reported on 10/13/2017) 60 Tab 6   • verapamil ER (CALAN-SR) 180 MG Tab CR Take 180 mg by mouth every evening.       No facility-administered encounter medications on file as of 10/13/2017.      Review of Systems   Constitutional: Negative for chills and fever.   HENT: Negative for congestion.    Eyes: Negative for blurred vision, pain and discharge.   Respiratory: Positive for shortness of breath. Negative for cough, hemoptysis  "and wheezing.    Cardiovascular: Positive for leg swelling. Negative for chest pain and palpitations.   Gastrointestinal: Negative for abdominal pain, blood in stool, nausea and vomiting.   Genitourinary: Positive for frequency.   Musculoskeletal: Positive for joint pain. Negative for myalgias.   Skin: Negative for itching and rash.   Neurological: Negative for speech change and loss of consciousness.   Endo/Heme/Allergies: Does not bruise/bleed easily.   Psychiatric/Behavioral: Negative for depression. The patient is not nervous/anxious.    All other systems reviewed and are negative.       Objective:   /80   Pulse (!) 56   Resp 14   Ht 1.803 m (5' 11\")   Wt 108.9 kg (240 lb)   SpO2 96%   BMI 33.47 kg/m²      Physical Exam   Constitutional: He is oriented to person, place, and time. No distress.   Obese   HENT:   Mouth/Throat: Mucous membranes are normal.   Eyes: Conjunctivae and EOM are normal.   Neck: No JVD present. No tracheal deviation present. No thyroid mass and no thyromegaly present.   Cardiovascular: Normal rate and intact distal pulses.  An irregularly irregular rhythm present. Exam reveals distant heart sounds.    Murmur heard.   Systolic murmur is present with a grade of 2/6   Pulmonary/Chest: Effort normal and breath sounds normal. No respiratory distress. He exhibits no tenderness.   Abdominal: Soft. There is no tenderness.   Musculoskeletal: Normal range of motion. He exhibits no edema.   Neurological: He is alert and oriented to person, place, and time. He has normal strength. He displays no tremor.   Skin: Skin is warm and dry. He is not diaphoretic.   Psychiatric: He has a normal mood and affect. His behavior is normal.   Vitals reviewed.      Assessment:     1. Paroxysmal atrial fibrillation (CMS-[facility])  ECHOCARDIOGRAM COMP W/O CONT   2. Essential hypertension  COMP METABOLIC PANEL   3. Chronic anticoagulation     4. Morbid obesity due to excess calories (CMS-[facility])     5. Mixed " hyperlipidemia  COMP METABOLIC PANEL    LIPID PROFILE   6. Non-rheumatic mitral regurgitation  ECHOCARDIOGRAM COMP W/O CONT   7.      Dyspnea on exertion    Medical Decision Making:  Today's Assessment / Status / Plan:            No Recipients

## 2017-11-03 LAB
ALBUMIN SERPL-MCNC: 4.2 G/DL (ref 3.6–4.8)
ALBUMIN/GLOB SERPL: 1.4 {RATIO} (ref 1.2–2.2)
ALP SERPL-CCNC: 56 IU/L (ref 39–117)
ALT SERPL-CCNC: 13 IU/L (ref 0–44)
AST SERPL-CCNC: 21 IU/L (ref 0–40)
BILIRUB SERPL-MCNC: 0.5 MG/DL (ref 0–1.2)
BNP SERPL-MCNC: 197.7 PG/ML (ref 0–100)
BUN SERPL-MCNC: 11 MG/DL (ref 8–27)
BUN/CREAT SERPL: 14 (ref 10–24)
CALCIUM SERPL-MCNC: 9.5 MG/DL (ref 8.6–10.2)
CHLORIDE SERPL-SCNC: 99 MMOL/L (ref 96–106)
CHOLEST SERPL-MCNC: 160 MG/DL (ref 100–199)
CO2 SERPL-SCNC: 27 MMOL/L (ref 18–29)
COMMENT 011824: NORMAL
CREAT SERPL-MCNC: 0.78 MG/DL (ref 0.76–1.27)
GFR SERPLBLD CREATININE-BSD FMLA CKD-EPI: 107 ML/MIN/1.73
GFR SERPLBLD CREATININE-BSD FMLA CKD-EPI: 93 ML/MIN/1.73
GLOBULIN SER CALC-MCNC: 3 G/DL (ref 1.5–4.5)
GLUCOSE SERPL-MCNC: 109 MG/DL (ref 65–99)
HDLC SERPL-MCNC: 47 MG/DL
LDLC SERPL CALC-MCNC: 88 MG/DL (ref 0–99)
POTASSIUM SERPL-SCNC: 4.4 MMOL/L (ref 3.5–5.2)
PROT SERPL-MCNC: 7.2 G/DL (ref 6–8.5)
SODIUM SERPL-SCNC: 141 MMOL/L (ref 134–144)
TRIGL SERPL-MCNC: 124 MG/DL (ref 0–149)
VLDLC SERPL CALC-MCNC: 25 MG/DL (ref 5–40)

## 2017-11-06 ENCOUNTER — TELEPHONE (OUTPATIENT)
Dept: CARDIOLOGY | Facility: MEDICAL CENTER | Age: 67
End: 2017-11-06

## 2017-11-06 NOTE — TELEPHONE ENCOUNTER
L/m notifying pt of lab results. Educated him to please call office to discuss any questions.     ----- Message -----  From: Ct Hess M.D.  Sent: 11/6/2017  10:20 AM  To: Jailene Duque R.N.    Labs all NL except glucose 109    ----- Message -----  From: Jailene Duque R.N.  Sent: 11/3/2017  10:55 AM  To: Ct Hess M.D.    FV 12/20.  To SONIA TAM.

## 2017-12-05 ENCOUNTER — APPOINTMENT (OUTPATIENT)
Dept: CARDIOLOGY | Facility: MEDICAL CENTER | Age: 67
End: 2017-12-05
Attending: INTERNAL MEDICINE
Payer: COMMERCIAL

## 2017-12-08 ENCOUNTER — TELEPHONE (OUTPATIENT)
Dept: CARDIOLOGY | Facility: MEDICAL CENTER | Age: 67
End: 2017-12-08

## 2017-12-20 ENCOUNTER — OFFICE VISIT (OUTPATIENT)
Dept: CARDIOLOGY | Facility: MEDICAL CENTER | Age: 67
End: 2017-12-20
Payer: COMMERCIAL

## 2017-12-20 VITALS
HEIGHT: 71 IN | HEART RATE: 72 BPM | WEIGHT: 242 LBS | DIASTOLIC BLOOD PRESSURE: 80 MMHG | SYSTOLIC BLOOD PRESSURE: 140 MMHG | BODY MASS INDEX: 33.88 KG/M2 | OXYGEN SATURATION: 95 %

## 2017-12-20 DIAGNOSIS — I34.0 NON-RHEUMATIC MITRAL REGURGITATION: ICD-10-CM

## 2017-12-20 DIAGNOSIS — I48.0 PAROXYSMAL ATRIAL FIBRILLATION (HCC): ICD-10-CM

## 2017-12-20 DIAGNOSIS — E78.2 MIXED HYPERLIPIDEMIA: ICD-10-CM

## 2017-12-20 DIAGNOSIS — Z79.01 CHRONIC ANTICOAGULATION: ICD-10-CM

## 2017-12-20 PROCEDURE — 99214 OFFICE O/P EST MOD 30 MIN: CPT | Performed by: INTERNAL MEDICINE

## 2017-12-20 ASSESSMENT — ENCOUNTER SYMPTOMS
FEVER: 0
ABDOMINAL PAIN: 0
COUGH: 0
WHEEZING: 0
BLURRED VISION: 0
BRUISES/BLEEDS EASILY: 0
SPEECH CHANGE: 0
NAUSEA: 0
PALPITATIONS: 0
DEPRESSION: 0
MYALGIAS: 0
NERVOUS/ANXIOUS: 0
EYE PAIN: 0
VOMITING: 0
HEMOPTYSIS: 0
CHILLS: 0
EYE DISCHARGE: 0
LOSS OF CONSCIOUSNESS: 0

## 2017-12-20 NOTE — LETTER
[facility]   [address], [address]  [place] [address]  Phone: [phone]  Fax: [phone]              Kip Harper  1950    Encounter Date: 12/20/2017    Ct Hess M.D.          PROGRESS NOTE:  No notes on file      No Recipients

## 2017-12-20 NOTE — PROGRESS NOTES
Subjective:   Kip Harper is a 67 y.o. male who presents today for f/u PAF, anticoagulation, HTN and MR    The patient is doing well from cardiac standpoint.   No edema on furosemide 20 mg/d. Would like to try without it.  Less dyspnic after avoiding eating too late  He denies any chest pain, palpitation or other heart failure type symptoms.  Denies any side effect from medications.    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      Past Surgical History:   Procedure Laterality Date   • GASTROSCOPY-ENDO  8/11/2016    Procedure: GASTROSCOPY-ENDO;  Surgeon: Jones Moyer M.D.;  Location: ENDOSCOPY [facility];  Service:      Family History   Problem Relation Age of Onset   • Heart Disease Mother    • Diabetes Mother    • Heart Disease Father    • Lung Disease Father      History   Smoking Status   • Never Smoker   Smokeless Tobacco   • Never Used     Allergies   Allergen Reactions   • No Known Drug Allergy      Outpatient Encounter Prescriptions as of 12/20/2017   Medication Sig Dispense Refill   • apixaban (ELIQUIS) 5mg Tab Take 1 Tab by mouth 2 Times a Day. 60 Tab 11   • allopurinol (ZYLOPRIM) 100 MG Tab Take 100 mg by mouth every day.  11   • furosemide (LASIX) 20 MG Tab Take 1 Tab by mouth every day. 30 Tab 5   • verapamil ER (CALAN-SR) 180 MG Tab CR TAKE 1 TABLET BY MOUTH EVERY DAY 90 Tab 3   • metoprolol (LOPRESSOR) 50 MG Tab Take 1 Tab by mouth 2 Times a Day. 60 Tab 11   • pravastatin (PRAVACHOL) 40 MG tablet Take 40 mg by mouth every evening.     • pravastatin (PRAVACHOL) 40 MG tablet TAKE 1 TABLET BY MOUTH IN THE EVENING 90 Tab 3   • [DISCONTINUED] apixaban (ELIQUIS) 5mg Tab Take 1 Tab by mouth 2 Times a Day. 60 Tab 0   • indomethacin (INDOCIN) 50 MG Cap  Take 1 Cap by mouth 3 times a day. (Patient not taking: Reported on 10/13/2017) 90 Cap 0   • MethylPREDNISolone (MEDROL DOSEPAK) 4 MG Tablet Therapy Pack Take 1 Tab by mouth every day. (Patient not taking: Reported on 8/10/2017) 1 Kit 0   • oxycodone-acetaminophen (PERCOCET) 5-325 MG Tab Take 1-2 Tabs by mouth every 6 hours as needed. (Patient not taking: Reported on 8/10/2017) 10 Tab 0   • MethylPREDNISolone (MEDROL DOSEPAK) 4 MG Tablet Therapy Pack UAD (Patient not taking: Reported on 10/13/2017) 1 Kit 0   • oxycodone-acetaminophen (PERCOCET) 5-325 MG Tab Take 1-2 Tabs by mouth every 6 hours as needed for Severe Pain. (Patient not taking: Reported on 10/13/2017) 15 Tab 0   • predniSONE (DELTASONE) 10 MG Tab Take 1 tablet 3 times daily for 5 days (Patient not taking: Reported on 10/13/2017) 15 Tab 1   • MethylPREDNISolone (MEDROL DOSEPAK) 4 MG Tablet Therapy Pack Take 1 Tab by mouth every day. (Patient not taking: Reported on 10/13/2017) 1 Kit 0   • MethylPREDNISolone (MEDROL DOSEPAK) 4 MG Tablet Therapy Pack TAKE AS DIRECTED ON PACKAGE. (Patient not taking: Reported on 10/13/2017) 21 Tab 0   • colchicine (COLCRYS) 0.6 MG Tab TAKE 2 TABS AT SIGNS OF GOUT ATTACK, THEN 1 TAB ONE HOUR AFTER FIRST DOSE. DO NOT REPEAT EARLIER THAN 3 DAYS. (Patient not taking: Reported on 10/13/2017) 30 Tab 0   • hydrocodone-acetaminophen (NORCO) 5-325 MG Tab per tablet 1 TAB EVERY 6 HOURS ONLY IF NEEDED FOR PAIN. MAY CAUSE DROWSINESS. (Patient not taking: Reported on 10/13/2017) 28 Tab 0   • potassium chloride SA (K-DUR) 20 MEQ Tab CR Take 2 Tabs by mouth every day. (Patient not taking: Reported on 10/13/2017) 60 Tab 1   • pantoprazole (PROTONIX) 40 MG Tablet Delayed Response Take 1 Tab by mouth 2 times a day. (Patient not taking: Reported on 10/13/2017) 60 Tab 6   • verapamil ER (CALAN-SR) 180 MG Tab CR Take 180 mg by mouth every evening.       No facility-administered encounter medications on file as of 12/20/2017.      Review of  "Systems   Constitutional: Negative for chills and fever.   HENT: Negative for congestion.    Eyes: Negative for blurred vision, pain and discharge.   Respiratory: Negative for cough, hemoptysis and wheezing.    Cardiovascular: Negative for chest pain and palpitations.   Gastrointestinal: Negative for abdominal pain, nausea and vomiting.   Musculoskeletal: Negative for joint pain and myalgias.   Skin: Negative for itching and rash.   Neurological: Negative for speech change and loss of consciousness.   Endo/Heme/Allergies: Does not bruise/bleed easily.   Psychiatric/Behavioral: Negative for depression. The patient is not nervous/anxious.    All other systems reviewed and are negative.       Objective:   /80   Pulse 72   Ht 1.803 m (5' 11\")   Wt 109.8 kg (242 lb)   SpO2 95%   BMI 33.75 kg/m²     Physical Exam   Constitutional: He is oriented to person, place, and time. No distress.   HENT:   Mouth/Throat: Mucous membranes are normal.   Eyes: Conjunctivae and EOM are normal.   Neck: No JVD present. No tracheal deviation present. No thyroid mass and no thyromegaly present.   Cardiovascular: Normal rate, regular rhythm and intact distal pulses.  Exam reveals distant heart sounds.    No murmur heard.  Pulmonary/Chest: Effort normal. No respiratory distress. He has rhonchi. He has no rales. He exhibits no tenderness.   Abdominal: Soft. There is no tenderness.   Musculoskeletal: Normal range of motion. He exhibits no edema.   Neurological: He is alert and oriented to person, place, and time. He has normal strength. He displays no tremor.   Skin: Skin is warm and dry. He is not diaphoretic.   Psychiatric: He has a normal mood and affect. His behavior is normal.   Vitals reviewed.    CMP NL,   LDL 88,     Assessment:     1. Paroxysmal atrial fibrillation (CMS-HCC)      apixaban (ELIQUIS) 5mg Tab   2. Non-rheumatic mitral regurgitation mild, NL EF    3. Chronic anticoagulation     4. Mixed " hyperlipidemia         Medical Decision Making:  Today's Assessment / Status / Plan:     The patient's above cardiovascular conditions are stable. Some of his dyspnea is likely due to his body habitus.  He is to try to lose some weight. May try taking furosemide every pother day.  Will continue current medications and have the patient return for a followup in 6 months.   Will be happy to see the patient sooner as needed.   Thank you for allowing me to participate in the caring of this patient.

## 2018-06-27 ENCOUNTER — OFFICE VISIT (OUTPATIENT)
Dept: CARDIOLOGY | Facility: MEDICAL CENTER | Age: 68
End: 2018-06-27
Payer: COMMERCIAL

## 2018-06-27 VITALS
OXYGEN SATURATION: 98 % | DIASTOLIC BLOOD PRESSURE: 80 MMHG | HEIGHT: 71 IN | WEIGHT: 241 LBS | BODY MASS INDEX: 33.74 KG/M2 | HEART RATE: 74 BPM | SYSTOLIC BLOOD PRESSURE: 140 MMHG

## 2018-06-27 DIAGNOSIS — I34.0 NON-RHEUMATIC MITRAL REGURGITATION: ICD-10-CM

## 2018-06-27 DIAGNOSIS — Z79.01 CHRONIC ANTICOAGULATION: ICD-10-CM

## 2018-06-27 DIAGNOSIS — E78.2 MIXED HYPERLIPIDEMIA: ICD-10-CM

## 2018-06-27 DIAGNOSIS — I10 ESSENTIAL HYPERTENSION, BENIGN: ICD-10-CM

## 2018-06-27 DIAGNOSIS — I48.19 PERSISTENT ATRIAL FIBRILLATION (HCC): ICD-10-CM

## 2018-06-27 PROCEDURE — 99214 OFFICE O/P EST MOD 30 MIN: CPT | Performed by: INTERNAL MEDICINE

## 2018-06-27 ASSESSMENT — ENCOUNTER SYMPTOMS
BRUISES/BLEEDS EASILY: 0
NERVOUS/ANXIOUS: 0
PALPITATIONS: 0
EYE PAIN: 0
SPEECH CHANGE: 0
DEPRESSION: 0
EYE DISCHARGE: 0
LOSS OF CONSCIOUSNESS: 0
VOMITING: 0
COUGH: 0
HEMOPTYSIS: 0
MYALGIAS: 0
WHEEZING: 0
ABDOMINAL PAIN: 0
NAUSEA: 0
BLURRED VISION: 0

## 2018-06-27 NOTE — LETTER
[facility]   [address], [address]  [place] [address]  Phone: [phone]  Fax: [phone]              Kip Harper  1950    Encounter Date: 6/27/2018    Ct Hess M.D.          PROGRESS NOTE:  No notes on file      No Recipients

## 2018-06-27 NOTE — PROGRESS NOTES
Chief Complaint   Patient presents with   • Atrial Fibrillation     follow up   HTN  Anticoagulation    Subjective:   Kip Harper is a 68 y.o. male who presents today for f/u above issues    The patient is doing well from cardiac standpoint.   He denies any chest pain, palpitation or heart failure type symptoms.  Denies any side effect from medications.  No bleeding issue on apixaban.  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     Social History Main Topics   • Smoking status: Never Smoker   • Smokeless tobacco: Never Used   • Alcohol use No   • Drug use: No   • Sexual activity: Not on file     Other Topics Concern   • Not on file     Social History Narrative   • No narrative on file     Allergies   Allergen Reactions   • No Known Drug Allergy      Outpatient Encounter Prescriptions as of 6/27/2018   Medication Sig Dispense Refill   • metoprolol (LOPRESSOR) 50 MG Tab TAKE 1 TAB BY MOUTH 2 TIMES A DAY. 180 Tab 3   • apixaban (ELIQUIS) 5mg Tab Take 1 Tab by mouth 2 Times a Day. 60 Tab 11   • pravastatin (PRAVACHOL) 40 MG tablet TAKE 1 TABLET BY MOUTH IN THE EVENING 90 Tab 3   • allopurinol (ZYLOPRIM) 100 MG Tab Take 100 mg by mouth every day.  11   • verapamil ER (CALAN-SR)  180 MG Tab CR Take 180 mg by mouth every evening.     • ELIQUIS 5 MG Tab TAKE 1 TAB BY MOUTH 2 TIMES A DAY. 180 Tab 3   • furosemide (LASIX) 20 MG Tab Take 1 Tab by mouth every day. (Patient not taking: Reported on 6/27/2018) 30 Tab 5   • verapamil ER (CALAN-SR) 180 MG Tab CR TAKE 1 TABLET BY MOUTH EVERY DAY 90 Tab 3   • indomethacin (INDOCIN) 50 MG Cap Take 1 Cap by mouth 3 times a day. (Patient not taking: Reported on 10/13/2017) 90 Cap 0   • MethylPREDNISolone (MEDROL DOSEPAK) 4 MG Tablet Therapy Pack Take 1 Tab by mouth every day. (Patient not taking: Reported on 8/10/2017) 1 Kit 0   • oxycodone-acetaminophen (PERCOCET) 5-325 MG Tab Take 1-2 Tabs by mouth every 6 hours as needed. (Patient not taking: Reported on 8/10/2017) 10 Tab 0   • MethylPREDNISolone (MEDROL DOSEPAK) 4 MG Tablet Therapy Pack UAD (Patient not taking: Reported on 10/13/2017) 1 Kit 0   • oxycodone-acetaminophen (PERCOCET) 5-325 MG Tab Take 1-2 Tabs by mouth every 6 hours as needed for Severe Pain. (Patient not taking: Reported on 10/13/2017) 15 Tab 0   • predniSONE (DELTASONE) 10 MG Tab Take 1 tablet 3 times daily for 5 days (Patient not taking: Reported on 10/13/2017) 15 Tab 1   • MethylPREDNISolone (MEDROL DOSEPAK) 4 MG Tablet Therapy Pack Take 1 Tab by mouth every day. (Patient not taking: Reported on 10/13/2017) 1 Kit 0   • MethylPREDNISolone (MEDROL DOSEPAK) 4 MG Tablet Therapy Pack TAKE AS DIRECTED ON PACKAGE. (Patient not taking: Reported on 10/13/2017) 21 Tab 0   • colchicine (COLCRYS) 0.6 MG Tab TAKE 2 TABS AT SIGNS OF GOUT ATTACK, THEN 1 TAB ONE HOUR AFTER FIRST DOSE. DO NOT REPEAT EARLIER THAN 3 DAYS. (Patient not taking: Reported on 10/13/2017) 30 Tab 0   • hydrocodone-acetaminophen (NORCO) 5-325 MG Tab per tablet 1 TAB EVERY 6 HOURS ONLY IF NEEDED FOR PAIN. MAY CAUSE DROWSINESS. (Patient not taking: Reported on 10/13/2017) 28 Tab 0   • potassium chloride SA (K-DUR) 20 MEQ Tab CR Take 2 Tabs by mouth every day. (Patient not taking:  "Reported on 10/13/2017) 60 Tab 1   • pantoprazole (PROTONIX) 40 MG Tablet Delayed Response Take 1 Tab by mouth 2 times a day. (Patient not taking: Reported on 10/13/2017) 60 Tab 6   • pravastatin (PRAVACHOL) 40 MG tablet Take 40 mg by mouth every evening.       No facility-administered encounter medications on file as of 6/27/2018.      Review of Systems   HENT: Negative for congestion.    Eyes: Negative for blurred vision, pain and discharge.   Respiratory: Negative for cough, hemoptysis and wheezing.    Cardiovascular: Negative for chest pain and palpitations.   Gastrointestinal: Negative for abdominal pain, nausea and vomiting.   Musculoskeletal: Negative for joint pain and myalgias.   Skin: Negative for itching and rash.   Neurological: Negative for speech change and loss of consciousness.   Endo/Heme/Allergies: Does not bruise/bleed easily.   Psychiatric/Behavioral: Negative for depression. The patient is not nervous/anxious.    All other systems reviewed and are negative.       Objective:   /80   Pulse 74   Ht 1.803 m (5' 11\")   Wt 109.3 kg (241 lb)   SpO2 98%   BMI 33.61 kg/m²     Physical Exam   Constitutional: He is oriented to person, place, and time.   Obese   Neck: No JVD present.   Cardiovascular: Normal rate.  An irregularly irregular rhythm present. Exam reveals distant heart sounds. Exam reveals no gallop.    No murmur heard.  Pulmonary/Chest: Effort normal and breath sounds normal. No respiratory distress. He has no wheezes. He has no rales.   Abdominal: Soft. He exhibits no distension. There is no tenderness.   Musculoskeletal: He exhibits no edema.   Neurological: He is alert and oriented to person, place, and time.   Skin: Skin is warm and dry. No erythema.   Psychiatric: He has a normal mood and affect. His behavior is normal.       Assessment:     1. Essential hypertension, benign  COMP METABOLIC PANEL   2. Chronic anticoagulation     3. Non-rheumatic mitral regurgitation     4. " Mixed hyperlipidemia  LIPID PROFILE    COMP METABOLIC PANEL   5. Persistent atrial fibrillation (HCC)         Medical Decision Making:  Today's Assessment / Status / Plan:     The patient's above cardiovascular conditions are stable.   Advised to lose some weight.  Will continue current medications and have the patient return for a followup in 6 months with labs. Will be happy to see the patient sooner as needed.   Thank you for allowing me to participate in the caring of this patient.

## 2018-11-30 LAB
ALBUMIN SERPL-MCNC: 4.4 G/DL (ref 3.6–4.8)
ALBUMIN/GLOB SERPL: 1.6 {RATIO} (ref 1.2–2.2)
ALP SERPL-CCNC: 53 IU/L (ref 39–117)
ALT SERPL-CCNC: 16 IU/L (ref 0–44)
AST SERPL-CCNC: 21 IU/L (ref 0–40)
BILIRUB SERPL-MCNC: 0.6 MG/DL (ref 0–1.2)
BUN SERPL-MCNC: 10 MG/DL (ref 8–27)
BUN/CREAT SERPL: 12 (ref 10–24)
CALCIUM SERPL-MCNC: 9.1 MG/DL (ref 8.6–10.2)
CHLORIDE SERPL-SCNC: 103 MMOL/L (ref 96–106)
CHOLEST SERPL-MCNC: 154 MG/DL (ref 100–199)
CO2 SERPL-SCNC: 24 MMOL/L (ref 20–29)
CREAT SERPL-MCNC: 0.82 MG/DL (ref 0.76–1.27)
GLOBULIN SER CALC-MCNC: 2.8 G/DL (ref 1.5–4.5)
GLUCOSE SERPL-MCNC: 101 MG/DL (ref 65–99)
HDLC SERPL-MCNC: 48 MG/DL
IF AFRICAN AMERICAN  100797: 104 ML/MIN/1.73
IF NON AFRICAN AMER 100791: 90 ML/MIN/1.73
LABORATORY COMMENT REPORT: NORMAL
LDLC SERPL CALC-MCNC: 85 MG/DL (ref 0–99)
POTASSIUM SERPL-SCNC: 4.3 MMOL/L (ref 3.5–5.2)
PROT SERPL-MCNC: 7.2 G/DL (ref 6–8.5)
SODIUM SERPL-SCNC: 140 MMOL/L (ref 134–144)
TRIGL SERPL-MCNC: 105 MG/DL (ref 0–149)
VLDLC SERPL CALC-MCNC: 21 MG/DL (ref 5–40)

## 2018-12-05 ENCOUNTER — OFFICE VISIT (OUTPATIENT)
Dept: CARDIOLOGY | Facility: MEDICAL CENTER | Age: 68
End: 2018-12-05
Payer: COMMERCIAL

## 2018-12-05 VITALS
BODY MASS INDEX: 33.74 KG/M2 | HEIGHT: 71 IN | HEART RATE: 66 BPM | SYSTOLIC BLOOD PRESSURE: 140 MMHG | DIASTOLIC BLOOD PRESSURE: 88 MMHG | OXYGEN SATURATION: 96 % | WEIGHT: 241 LBS

## 2018-12-05 DIAGNOSIS — I34.0 NON-RHEUMATIC MITRAL REGURGITATION: ICD-10-CM

## 2018-12-05 DIAGNOSIS — E78.2 MIXED HYPERLIPIDEMIA: ICD-10-CM

## 2018-12-05 DIAGNOSIS — I48.19 PERSISTENT ATRIAL FIBRILLATION (HCC): ICD-10-CM

## 2018-12-05 DIAGNOSIS — I10 ESSENTIAL HYPERTENSION: ICD-10-CM

## 2018-12-05 DIAGNOSIS — Z79.01 CHRONIC ANTICOAGULATION: ICD-10-CM

## 2018-12-05 PROCEDURE — 99214 OFFICE O/P EST MOD 30 MIN: CPT | Performed by: INTERNAL MEDICINE

## 2018-12-05 ASSESSMENT — ENCOUNTER SYMPTOMS
DIZZINESS: 0
BRUISES/BLEEDS EASILY: 0
HEMOPTYSIS: 0
NECK PAIN: 0
FOCAL WEAKNESS: 0
INSOMNIA: 0
DOUBLE VISION: 0
PALPITATIONS: 0
HEADACHES: 0
SENSORY CHANGE: 0
BLURRED VISION: 0
NAUSEA: 0
VOMITING: 0
SHORTNESS OF BREATH: 0
BLOOD IN STOOL: 0
BACK PAIN: 0
NERVOUS/ANXIOUS: 0
LOSS OF CONSCIOUSNESS: 0

## 2018-12-05 NOTE — LETTER
[facility]   [address], [facility] [address]  [place] [address]  Phone: [phone]  Fax: [phone]              Kip Harper  1950    Encounter Date: 12/5/2018    Ct Hess M.D.          PROGRESS NOTE:  Chief Complaint   Patient presents with   • Hypertension     follow up   Atrial fibrillation   Chronic anticoagulation  Dyslipidemia    Subjective:   Kip Harper is a 68 y.o. male who presents today for follow-up of above issues    The patient is doing well from cardiac standpoint.   He denies any chest pain, palpitation or heart failure type symptoms.  Denies any side effect from medications especially bleeding.  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     Social History Main Topics   • Smoking status: Never Smoker   • Smokeless tobacco: Never Used   • Alcohol use No   • Drug use: No   • Sexual activity: Not on file     Other Topics Concern   • Not on file     Social History Narrative   • No narrative on file     Allergies   Allergen Reactions   • No Known Drug Allergy      Outpatient Encounter Prescriptions as of 12/5/2018   Medication Sig Dispense  Refill   • apixaban (ELIQUIS) 5mg Tab Take 1 Tab by mouth 2 Times a Day. 60 Tab 11   • pravastatin (PRAVACHOL) 40 MG tablet TAKE 1 TABLET BY MOUTH IN THE EVENING 90 Tab 3   • verapamil ER (CALAN-SR) 180 MG Tab CR TAKE 1 TABLET BY MOUTH EVERY DAY 90 Tab 3   • metoprolol (LOPRESSOR) 50 MG Tab TAKE 1 TAB BY MOUTH 2 TIMES A DAY. 180 Tab 3   • allopurinol (ZYLOPRIM) 100 MG Tab Take 100 mg by mouth every day.  11   • ELIQUIS 5 MG Tab TAKE 1 TAB BY MOUTH 2 TIMES A DAY. (Patient not taking: Reported on 12/5/2018) 180 Tab 3   • [DISCONTINUED] apixaban (ELIQUIS) 5mg Tab Take 1 Tab by mouth 2 Times a Day. 60 Tab 11   • furosemide (LASIX) 20 MG Tab Take 1 Tab by mouth every day. (Patient not taking: Reported on 6/27/2018) 30 Tab 5   • indomethacin (INDOCIN) 50 MG Cap Take 1 Cap by mouth 3 times a day. (Patient not taking: Reported on 10/13/2017) 90 Cap 0   • MethylPREDNISolone (MEDROL DOSEPAK) 4 MG Tablet Therapy Pack Take 1 Tab by mouth every day. (Patient not taking: Reported on 8/10/2017) 1 Kit 0   • oxycodone-acetaminophen (PERCOCET) 5-325 MG Tab Take 1-2 Tabs by mouth every 6 hours as needed. (Patient not taking: Reported on 8/10/2017) 10 Tab 0   • MethylPREDNISolone (MEDROL DOSEPAK) 4 MG Tablet Therapy Pack UAD (Patient not taking: Reported on 10/13/2017) 1 Kit 0   • oxycodone-acetaminophen (PERCOCET) 5-325 MG Tab Take 1-2 Tabs by mouth every 6 hours as needed for Severe Pain. (Patient not taking: Reported on 10/13/2017) 15 Tab 0   • predniSONE (DELTASONE) 10 MG Tab Take 1 tablet 3 times daily for 5 days (Patient not taking: Reported on 10/13/2017) 15 Tab 1   • MethylPREDNISolone (MEDROL DOSEPAK) 4 MG Tablet Therapy Pack Take 1 Tab by mouth every day. (Patient not taking: Reported on 10/13/2017) 1 Kit 0   • MethylPREDNISolone (MEDROL DOSEPAK) 4 MG Tablet Therapy Pack TAKE AS DIRECTED ON PACKAGE. (Patient not taking: Reported on 10/13/2017) 21 Tab 0   • colchicine (COLCRYS) 0.6 MG Tab TAKE 2 TABS AT SIGNS OF GOUT ATTACK,  "THEN 1 TAB ONE HOUR AFTER FIRST DOSE. DO NOT REPEAT EARLIER THAN 3 DAYS. (Patient not taking: Reported on 10/13/2017) 30 Tab 0   • hydrocodone-acetaminophen (NORCO) 5-325 MG Tab per tablet 1 TAB EVERY 6 HOURS ONLY IF NEEDED FOR PAIN. MAY CAUSE DROWSINESS. (Patient not taking: Reported on 10/13/2017) 28 Tab 0   • potassium chloride SA (K-DUR) 20 MEQ Tab CR Take 2 Tabs by mouth every day. (Patient not taking: Reported on 10/13/2017) 60 Tab 1   • pantoprazole (PROTONIX) 40 MG Tablet Delayed Response Take 1 Tab by mouth 2 times a day. (Patient not taking: Reported on 10/13/2017) 60 Tab 6   • pravastatin (PRAVACHOL) 40 MG tablet Take 40 mg by mouth every evening.     • verapamil ER (CALAN-SR) 180 MG Tab CR Take 180 mg by mouth every evening.       No facility-administered encounter medications on file as of 12/5/2018.      Review of Systems   Constitutional: Negative for malaise/fatigue.        Unable to lose weight   HENT: Negative for nosebleeds.    Eyes: Negative for blurred vision and double vision.   Respiratory: Negative for hemoptysis and shortness of breath.    Cardiovascular: Negative for chest pain, palpitations and leg swelling.   Gastrointestinal: Negative for blood in stool, nausea and vomiting.   Musculoskeletal: Negative for back pain and neck pain.   Neurological: Negative for dizziness, sensory change, focal weakness, loss of consciousness and headaches.   Endo/Heme/Allergies: Does not bruise/bleed easily.   Psychiatric/Behavioral: The patient is not nervous/anxious and does not have insomnia.         Objective:   /88 (BP Location: Left arm)   Pulse 66   Ht 1.803 m (5' 11\")   Wt 109.3 kg (241 lb)   SpO2 96%   BMI 33.61 kg/m²      Physical Exam   Constitutional: He is oriented to person, place, and time. No distress.   Obese   HENT:   Head: Normocephalic and atraumatic.   Eyes: EOM are normal.   Neck: No JVD present. No thyromegaly present.   Cardiovascular: Normal rate.  An irregularly " irregular rhythm present. Exam reveals distant heart sounds. Exam reveals no gallop.    No murmur heard.  Pulmonary/Chest: Effort normal. No respiratory distress. He has no wheezes. He has no rales.   Abdominal: Soft. He exhibits no distension. There is no tenderness.   Musculoskeletal: He exhibits no edema or deformity.   Neurological: He is alert and oriented to person, place, and time. No cranial nerve deficit.   Skin: Skin is warm. No erythema.   Psychiatric: He has a normal mood and affect. His behavior is normal.     CMP last week was normal  Lipid last week LDL 85    Assessment:     1. Persistent atrial fibrillation (HCC)     2. Chronic anticoagulation     3. Mixed hyperlipidemia     4. Essential hypertension     5. Non-rheumatic mitral regurgitation         Medical Decision Making:  Today's Assessment / Status / Plan:     The patient's above cardiovascular conditions are stable.  Her blood pressure is slightly elevated today but he believes it is usually in the normal range at home.  He was advised to lose some weight and continue close monitoring of his blood pressure .  We will continue current medications and have the patient return for a followup in 6 months. Will be happy to see the patient sooner as needed. Thank you for allowing me to participate in the caring of this patient.      No Recipients

## 2018-12-05 NOTE — PROGRESS NOTES
Chief Complaint   Patient presents with   • Hypertension     follow up   Atrial fibrillation   Chronic anticoagulation  Dyslipidemia    Subjective:   Kip Harper is a 68 y.o. male who presents today for follow-up of above issues    The patient is doing well from cardiac standpoint.   He denies any chest pain, palpitation or heart failure type symptoms.  Denies any side effect from medications especially bleeding.  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     Social History Main Topics   • Smoking status: Never Smoker   • Smokeless tobacco: Never Used   • Alcohol use No   • Drug use: No   • Sexual activity: Not on file     Other Topics Concern   • Not on file     Social History Narrative   • No narrative on file     Allergies   Allergen Reactions   • No Known Drug Allergy      Outpatient Encounter Prescriptions as of 12/5/2018   Medication Sig Dispense Refill   • apixaban (ELIQUIS) 5mg Tab Take 1 Tab by mouth 2 Times a Day. 60 Tab 11   • pravastatin (PRAVACHOL) 40 MG tablet TAKE 1 TABLET BY MOUTH IN THE EVENING 90 Tab 3   • verapamil ER (CALAN-SR) 180 MG Tab CR TAKE 1 TABLET BY MOUTH EVERY DAY 90 Tab 3   • metoprolol  (LOPRESSOR) 50 MG Tab TAKE 1 TAB BY MOUTH 2 TIMES A DAY. 180 Tab 3   • allopurinol (ZYLOPRIM) 100 MG Tab Take 100 mg by mouth every day.  11   • ELIQUIS 5 MG Tab TAKE 1 TAB BY MOUTH 2 TIMES A DAY. (Patient not taking: Reported on 12/5/2018) 180 Tab 3   • [DISCONTINUED] apixaban (ELIQUIS) 5mg Tab Take 1 Tab by mouth 2 Times a Day. 60 Tab 11   • furosemide (LASIX) 20 MG Tab Take 1 Tab by mouth every day. (Patient not taking: Reported on 6/27/2018) 30 Tab 5   • indomethacin (INDOCIN) 50 MG Cap Take 1 Cap by mouth 3 times a day. (Patient not taking: Reported on 10/13/2017) 90 Cap 0   • MethylPREDNISolone (MEDROL DOSEPAK) 4 MG Tablet Therapy Pack Take 1 Tab by mouth every day. (Patient not taking: Reported on 8/10/2017) 1 Kit 0   • oxycodone-acetaminophen (PERCOCET) 5-325 MG Tab Take 1-2 Tabs by mouth every 6 hours as needed. (Patient not taking: Reported on 8/10/2017) 10 Tab 0   • MethylPREDNISolone (MEDROL DOSEPAK) 4 MG Tablet Therapy Pack UAD (Patient not taking: Reported on 10/13/2017) 1 Kit 0   • oxycodone-acetaminophen (PERCOCET) 5-325 MG Tab Take 1-2 Tabs by mouth every 6 hours as needed for Severe Pain. (Patient not taking: Reported on 10/13/2017) 15 Tab 0   • predniSONE (DELTASONE) 10 MG Tab Take 1 tablet 3 times daily for 5 days (Patient not taking: Reported on 10/13/2017) 15 Tab 1   • MethylPREDNISolone (MEDROL DOSEPAK) 4 MG Tablet Therapy Pack Take 1 Tab by mouth every day. (Patient not taking: Reported on 10/13/2017) 1 Kit 0   • MethylPREDNISolone (MEDROL DOSEPAK) 4 MG Tablet Therapy Pack TAKE AS DIRECTED ON PACKAGE. (Patient not taking: Reported on 10/13/2017) 21 Tab 0   • colchicine (COLCRYS) 0.6 MG Tab TAKE 2 TABS AT SIGNS OF GOUT ATTACK, THEN 1 TAB ONE HOUR AFTER FIRST DOSE. DO NOT REPEAT EARLIER THAN 3 DAYS. (Patient not taking: Reported on 10/13/2017) 30 Tab 0   • hydrocodone-acetaminophen (NORCO) 5-325 MG Tab per tablet 1 TAB EVERY 6 HOURS ONLY IF NEEDED FOR PAIN. MAY CAUSE DROWSINESS. (Patient not  "taking: Reported on 10/13/2017) 28 Tab 0   • potassium chloride SA (K-DUR) 20 MEQ Tab CR Take 2 Tabs by mouth every day. (Patient not taking: Reported on 10/13/2017) 60 Tab 1   • pantoprazole (PROTONIX) 40 MG Tablet Delayed Response Take 1 Tab by mouth 2 times a day. (Patient not taking: Reported on 10/13/2017) 60 Tab 6   • pravastatin (PRAVACHOL) 40 MG tablet Take 40 mg by mouth every evening.     • verapamil ER (CALAN-SR) 180 MG Tab CR Take 180 mg by mouth every evening.       No facility-administered encounter medications on file as of 12/5/2018.      Review of Systems   Constitutional: Negative for malaise/fatigue.        Unable to lose weight   HENT: Negative for nosebleeds.    Eyes: Negative for blurred vision and double vision.   Respiratory: Negative for hemoptysis and shortness of breath.    Cardiovascular: Negative for chest pain, palpitations and leg swelling.   Gastrointestinal: Negative for blood in stool, nausea and vomiting.   Musculoskeletal: Negative for back pain and neck pain.        Some stiffness and joint pain in his hands   Neurological: Negative for dizziness, sensory change, focal weakness, loss of consciousness and headaches.   Endo/Heme/Allergies: Does not bruise/bleed easily.   Psychiatric/Behavioral: The patient is not nervous/anxious and does not have insomnia.         Objective:   /88 (BP Location: Left arm)   Pulse 66   Ht 1.803 m (5' 11\")   Wt 109.3 kg (241 lb)   SpO2 96%   BMI 33.61 kg/m²     Physical Exam   Constitutional: He is oriented to person, place, and time. No distress.   Obese   HENT:   Head: Normocephalic and atraumatic.   Eyes: EOM are normal.   Neck: No JVD present. No thyromegaly present.   Cardiovascular: Normal rate.  An irregularly irregular rhythm present. Exam reveals distant heart sounds. Exam reveals no gallop.    No murmur heard.  Pulmonary/Chest: Effort normal. No respiratory distress. He has no wheezes. He has no rales.   Abdominal: Soft. He " exhibits no distension. There is no tenderness.   Musculoskeletal: He exhibits no edema or deformity.   Neurological: He is alert and oriented to person, place, and time. No cranial nerve deficit.   Skin: Skin is warm. No erythema.   Psychiatric: He has a normal mood and affect. His behavior is normal.     CMP last week was normal  Lipid last week LDL 85    Assessment:     1. Persistent atrial fibrillation (HCC)     2. Chronic anticoagulation     3. Mixed hyperlipidemia     4. Essential hypertension     5. Non-rheumatic mitral regurgitation         Medical Decision Making:  Today's Assessment / Status / Plan:     The patient's above cardiovascular conditions are stable.  Her blood pressure is slightly elevated today but he believes it is usually in the normal range at home.  He was advised to lose some weight and continue close monitoring of his blood pressure .  We will continue current medications and have the patient return for a followup in 6 months. Will be happy to see the patient sooner as needed. Thank you for allowing me to participate in the caring of this patient.

## 2019-03-16 ENCOUNTER — OFFICE VISIT (OUTPATIENT)
Dept: URGENT CARE | Facility: CLINIC | Age: 69
End: 2019-03-16
Payer: COMMERCIAL

## 2019-03-16 VITALS
TEMPERATURE: 98.3 F | RESPIRATION RATE: 14 BRPM | DIASTOLIC BLOOD PRESSURE: 90 MMHG | WEIGHT: 241 LBS | OXYGEN SATURATION: 97 % | HEIGHT: 71 IN | BODY MASS INDEX: 33.74 KG/M2 | HEART RATE: 96 BPM | SYSTOLIC BLOOD PRESSURE: 134 MMHG

## 2019-03-16 DIAGNOSIS — Z23 NEED FOR VACCINATION: ICD-10-CM

## 2019-03-16 DIAGNOSIS — S61.209A AVULSION OF FINGERTIP, INITIAL ENCOUNTER: Primary | ICD-10-CM

## 2019-03-16 DIAGNOSIS — S61.412A LACERATION OF MULTIPLE SITES OF LEFT HAND AND FINGERS, INITIAL ENCOUNTER: ICD-10-CM

## 2019-03-16 DIAGNOSIS — S61.219A LACERATION OF MULTIPLE SITES OF LEFT HAND AND FINGERS, INITIAL ENCOUNTER: ICD-10-CM

## 2019-03-16 PROCEDURE — 12001 RPR S/N/AX/GEN/TRNK 2.5CM/<: CPT | Performed by: PHYSICIAN ASSISTANT

## 2019-03-16 PROCEDURE — 90715 TDAP VACCINE 7 YRS/> IM: CPT | Performed by: PHYSICIAN ASSISTANT

## 2019-03-16 PROCEDURE — 90471 IMMUNIZATION ADMIN: CPT | Performed by: PHYSICIAN ASSISTANT

## 2019-03-16 ASSESSMENT — ENCOUNTER SYMPTOMS
TINGLING: 0
CONSTIPATION: 0
FEVER: 0
WEAKNESS: 0
FALLS: 0
SHORTNESS OF BREATH: 0
VOMITING: 0
DIARRHEA: 0
NAUSEA: 0
COUGH: 0
ABDOMINAL PAIN: 0
CHILLS: 0

## 2019-03-17 NOTE — PROGRESS NOTES
Subjective:   Kip Harper is a 68 y.o. male who presents for Other ((R) hand middle and index finger lacerations. )        Laceration    The incident occurred 3 to 6 hours ago. Injury mechanism: saw. The pain is moderate. The pain has been constant since onset. He reports no foreign bodies present. His tetanus status is out of date.     Pt presents to clinic with avulsion of fingertip of the L 3nd finger and small lac to the tip of the 2nd finger.  He is currently anticoagulated for A. Fib.     Review of Systems   Constitutional: Negative for chills, fever and malaise/fatigue.   Respiratory: Negative for cough and shortness of breath.    Gastrointestinal: Negative for abdominal pain, constipation, diarrhea, nausea and vomiting.   Musculoskeletal: Negative for falls and joint pain.   Neurological: Negative for tingling and weakness.   All other systems reviewed and are negative.      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  MEDS:   Current Outpatient Prescriptions:   •  apixaban (ELIQUIS) 5mg Tab, Take 1 Tab by mouth 2 Times a Day., Disp: 60 Tab, Rfl: 11  •  pravastatin (PRAVACHOL) 40 MG tablet, TAKE 1 TABLET BY MOUTH IN THE EVENING, Disp: 90 Tab, Rfl: 3  •  verapamil ER (CALAN-SR) 180 MG Tab CR, TAKE 1 TABLET BY MOUTH EVERY DAY, Disp: 90 Tab, Rfl: 3  •  metoprolol (LOPRESSOR) 50 MG Tab, TAKE 1 TAB BY MOUTH 2 TIMES A DAY., Disp: 180 Tab, Rfl: 3  •  allopurinol (ZYLOPRIM) 100 MG Tab, Take 100 mg by mouth every day., Disp: , Rfl: 11  •  colchicine (COLCRYS) 0.6 MG Tab, TAKE 2 TABS AT SIGNS OF GOUT ATTACK, THEN 1 TAB ONE HOUR AFTER FIRST DOSE. DO NOT REPEAT EARLIER THAN 3 DAYS. (Patient not taking: Reported on 10/13/2017), Disp: 30 Tab, Rfl: 0  •  potassium chloride SA (K-DUR) 20 MEQ Tab CR, Take 2 Tabs by mouth every day. (Patient not taking: Reported  "on 10/13/2017), Disp: 60 Tab, Rfl: 1  •  pravastatin (PRAVACHOL) 40 MG tablet, Take 40 mg by mouth every evening., Disp: , Rfl:   ALLERGIES:   Allergies   Allergen Reactions   • No Known Drug Allergy      SURGHX:   Past Surgical History:   Procedure Laterality Date   • GASTROSCOPY-ENDO  8/11/2016    Procedure: GASTROSCOPY-ENDO;  Surgeon: Jones Moyer M.D.;  Location: ENDOSCOPY [facility];  Service:      SOCHX:  reports that he has never smoked. He has never used smokeless tobacco. He reports that he does not drink alcohol or use drugs.  Family History   Problem Relation Age of Onset   • Heart Disease Mother    • Diabetes Mother    • Heart Disease Father    • Lung Disease Father         Objective:   /90   Pulse 96   Temp 36.8 °C (98.3 °F)   Resp 14   Ht 1.803 m (5' 11\")   Wt 109.3 kg (241 lb)   SpO2 97%   BMI 33.61 kg/m²     Physical Exam   Constitutional: He is oriented to person, place, and time. He appears well-developed and well-nourished. No distress.   HENT:   Head: Normocephalic and atraumatic.   Nose: Nose normal.   Eyes: Pupils are equal, round, and reactive to light. Conjunctivae are normal.   Neck: Normal range of motion. Neck supple. No tracheal deviation present.   Cardiovascular: Normal rate and regular rhythm.    Pulmonary/Chest: Effort normal and breath sounds normal.   Musculoskeletal:        Hands:  Neurological: He is alert and oriented to person, place, and time.   Skin: Skin is warm and dry. Capillary refill takes less than 2 seconds.   Psychiatric: He has a normal mood and affect. His behavior is normal.   Vitals reviewed.        Assessment/Plan:     1. Avulsion of fingertip, initial encounter     2. Laceration of multiple sites of left hand and fingers, initial encounter     3. Need for vaccination  Tdap Vaccine =>8YO IM     Procedure Note: The area was prepped and draped in the usual sterile fashion. Local anesthesia was achieved using 2.0 cc of  Lidocaine 1% without " epinephrine. The avulsion wound was copiously irrigated. Area was cauterized. Estimated blood loss was less than 0.5 mL.     A dressing was applied to the area and anticipatory guidance, as well as standard post-procedure care, was explained. Return precautions are given. The patient tolerated the procedure well without complications. Wound care reviewed.     Tylenol for pain, pt on chronic anticoagulation.    Follow-up with primary care provider.  If symptoms worsen or persist patient can return to clinic immediately for reevaluation.  Red flags and STRICT emergency room precautions discussed.  Patient and wife verbalized understanding of information.

## 2019-03-17 NOTE — PATIENT INSTRUCTIONS
Deep Skin Avulsion  Introduction  A deep skin avulsion is a type of open wound. It often results from a severe injury (trauma) that tears away all layers of the skin or an entire body part. The areas of the body that are most often affected by a deep skin avulsion include the face, lips, ears, nose, and fingers.  A deep skin avulsion may make structures below the skin become visible. You may be able to see muscle, bone, nerves, and blood vessels. A deep skin avulsion can also damage important structures beneath the skin. These include tendons, ligaments, nerves, or blood vessels.  What are the causes?  Injuries that often cause a deep skin avulsion include:  · Being crushed.  · Falling against a jagged surface.  · Animal bites.  · Gunshot wounds.  · Severe burns.  · Injuries that involve being dragged, such as bicycle or motorcycle accidents.  What are the signs or symptoms?  Symptoms of a deep skin avulsion include:  · Pain.  · Numbness.  · Swelling.  · A misshapen body part.  · Bleeding, which may be heavy.  · Fluid leaking from the wound.  How is this diagnosed?  This condition may be diagnosed with a medical history and physical exam. You may also have X-rays done.  How is this treated?  The treatment that is chosen for a deep skin avulsion depends on how large and deep the wound is and where it is located. Treatment for all types of avulsions usually starts with:  · Controlling the bleeding.  · Washing out the wound with a germ-free (sterile) salt-water solution.  · Removing dead tissue from the wound.  A wound may be closed or left open to heal. This depends on the size and location of the wound and whether it is likely to become infected. Wounds are usually covered or closed if they expose blood vessels, nerves, bone, or cartilage.  · Wounds that are small and clean may be closed with stitches (sutures).  · Wounds that cannot be closed with sutures may be covered with a piece of skin (graft) or a skin flap.  Skin may be taken from on or near the wound, from another part of the body, or from a donor.  · Wounds may be left open if they are hard to close or they may become infected. These wounds heal over time from the bottom up.  You may also receive medicine. This may include:  · Antibiotics.  · A tetanus shot.  · Rabies vaccine.  Follow these instructions at home:  Medicines  · Take or apply over-the-counter and prescription medicines only as told by your health care provider.  · If you were prescribed an antibiotic, take or apply it as told by your health care provider. Do not stop taking the antibiotic even if your condition improves.  · You may get anti-itch medicine while your wound is healing. Use it only as told by your health care provider.  Wound care  · There are many ways to close and cover a wound. For example, a wound can be covered with sutures, skin glue, or adhesive strips. Follow instructions from your health care provider about:  ¨ How to take care of your wound.  ¨ When and how you should change your bandage (dressing).  ¨ When you should remove your dressing.  ¨ Removing whatever was used to close your wound.  · Keep the dressing dry as told by your health care provider. Do not take baths, swim, use a hot tub, or do anything that would put your wound underwater until your health care provider approves.  · Clean the wound each day or as told by your health care provider.  ¨ Wash the wound with mild soap and water.  ¨ Rinse the wound with water to remove all soap.  ¨ Pat the wound dry with a clean towel. Do not rub it.  · Do not scratch or pick at the wound.  · Check your wound every day for signs of infection. Watch for:  ¨ Redness, swelling, or pain.  ¨ Fluid, blood, or pus.  General instructions  · Raise (elevate) the injured area above the level of your heart while you are sitting or lying down.  · Keep all follow-up visits as told by your health care provider. This is important.  Contact a health  care provider if:  · You received a tetanus shot and you have swelling, severe pain, redness, or bleeding at the injection site.  · You have a fever.  · Your pain is not controlled with medicine.  · You have increased redness, swelling, or pain at the site of your wound.  · You have fluid, blood, or pus coming from your wound.  · You notice a bad smell coming from your wound or your dressing.  · A wound that was closed breaks open.  · You notice something coming out of the wound, such as wood or glass.  · You notice a change in the color of your skin near your wound.  · You develop a new rash.  · You need to change the dressing frequently due to fluid, blood, or pus draining from the wound.  Get help right away if:  · Your pain suddenly increases and is severe.  · You develop severe swelling around the wound.  · You develop numbness around the wound.  · You have nausea and vomiting that does not go away after 24 hours.  · You feel light-headed, weak, or faint.  · You develop chest pain.  · You have trouble breathing.  · Your wound is on your hand or foot and you cannot properly move a finger or toe.  · The wound is on your hand or foot and you notice that your fingers or toes look pale or bluish.  · You have a red streak going away from your wound.  This information is not intended to replace advice given to you by your health care provider. Make sure you discuss any questions you have with your health care provider.  Document Released: 02/13/2008 Document Revised: 05/25/2017 Document Reviewed: 12/23/2015  © 2017 [employer]

## 2019-06-05 ENCOUNTER — OFFICE VISIT (OUTPATIENT)
Dept: CARDIOLOGY | Facility: MEDICAL CENTER | Age: 69
End: 2019-06-05
Payer: COMMERCIAL

## 2019-06-05 VITALS
BODY MASS INDEX: 34.86 KG/M2 | OXYGEN SATURATION: 96 % | SYSTOLIC BLOOD PRESSURE: 154 MMHG | DIASTOLIC BLOOD PRESSURE: 106 MMHG | HEIGHT: 71 IN | HEART RATE: 84 BPM | WEIGHT: 249 LBS

## 2019-06-05 DIAGNOSIS — E78.2 MIXED HYPERLIPIDEMIA: ICD-10-CM

## 2019-06-05 DIAGNOSIS — Z79.01 CHRONIC ANTICOAGULATION: ICD-10-CM

## 2019-06-05 DIAGNOSIS — I48.19 PERSISTENT ATRIAL FIBRILLATION (HCC): ICD-10-CM

## 2019-06-05 DIAGNOSIS — I10 ESSENTIAL HYPERTENSION: ICD-10-CM

## 2019-06-05 DIAGNOSIS — N52.2 DRUG-INDUCED ERECTILE DYSFUNCTION: ICD-10-CM

## 2019-06-05 PROCEDURE — 99214 OFFICE O/P EST MOD 30 MIN: CPT | Performed by: INTERNAL MEDICINE

## 2019-06-05 RX ORDER — SILDENAFIL 25 MG/1
25 TABLET, FILM COATED ORAL PRN
Qty: 5 TAB | Refills: 3 | Status: SHIPPED | OUTPATIENT
Start: 2019-06-05 | End: 2021-04-08

## 2019-06-05 RX ORDER — LOSARTAN POTASSIUM AND HYDROCHLOROTHIAZIDE 12.5; 1 MG/1; MG/1
1 TABLET ORAL DAILY
Qty: 30 TAB | Refills: 5 | Status: SHIPPED | OUTPATIENT
Start: 2019-06-05 | End: 2019-12-16

## 2019-06-05 RX ORDER — VERAPAMIL HYDROCHLORIDE 240 MG/1
240 TABLET, FILM COATED, EXTENDED RELEASE ORAL DAILY
Qty: 30 TAB | Refills: 5 | Status: SHIPPED | OUTPATIENT
Start: 2019-06-05 | End: 2019-12-16

## 2019-06-05 ASSESSMENT — ENCOUNTER SYMPTOMS
BRUISES/BLEEDS EASILY: 0
DIZZINESS: 0
BLOOD IN STOOL: 0
SHORTNESS OF BREATH: 0
PALPITATIONS: 0
INSOMNIA: 0

## 2019-06-05 NOTE — PROGRESS NOTES
Chief Complaint   Patient presents with   • Atrial Fibrillation     follow up   Anticoagulation  HTN  Dyslipidemia    Subjective:   Kip Harper is a 69 y.o. male who presents today for follow-up of above issues    The patient is doing well from cardiac standpoint.   He denies any chest pain, palpitation or heart failure type symptoms.  Denies any side effect from medications.  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     Social History Main Topics   • Smoking status: Never Smoker   • Smokeless tobacco: Never Used   • Alcohol use No   • Drug use: No   • Sexual activity: Not on file     Other Topics Concern   • Not on file     Social History Narrative   • No narrative on file     Allergies   Allergen Reactions   • No Known Drug Allergy      Outpatient Encounter Prescriptions as of 6/5/2019   Medication Sig Dispense Refill   • apixaban (ELIQUIS) 5mg Tab Take 1 Tab by mouth 2 Times a Day. 60 Tab 11   • pravastatin (PRAVACHOL) 40 MG tablet TAKE 1 TABLET BY MOUTH IN THE EVENING 90 Tab 3   • verapamil ER (CALAN-SR) 180 MG Tab CR TAKE 1 TABLET BY MOUTH EVERY DAY 90 Tab 3   • metoprolol (LOPRESSOR)  "50 MG Tab TAKE 1 TAB BY MOUTH 2 TIMES A DAY. 180 Tab 3   • allopurinol (ZYLOPRIM) 100 MG Tab Take 100 mg by mouth every day.  11   • colchicine (COLCRYS) 0.6 MG Tab TAKE 2 TABS AT SIGNS OF GOUT ATTACK, THEN 1 TAB ONE HOUR AFTER FIRST DOSE. DO NOT REPEAT EARLIER THAN 3 DAYS. (Patient not taking: Reported on 10/13/2017) 30 Tab 0   • potassium chloride SA (K-DUR) 20 MEQ Tab CR Take 2 Tabs by mouth every day. (Patient not taking: Reported on 10/13/2017) 60 Tab 1   • pravastatin (PRAVACHOL) 40 MG tablet Take 40 mg by mouth every evening.       No facility-administered encounter medications on file as of 6/5/2019.      Review of Systems   Constitutional: Negative for malaise/fatigue.   HENT: Negative for nosebleeds.    Respiratory: Negative for shortness of breath.    Cardiovascular: Negative for chest pain, palpitations and leg swelling.   Gastrointestinal: Negative for blood in stool.   Genitourinary: Negative for hematuria.        Nocturia, thinks he drinks too much water  C/o ED, requesting sildenafil   Neurological: Negative for dizziness.   Endo/Heme/Allergies: Does not bruise/bleed easily.   Psychiatric/Behavioral: The patient does not have insomnia.         Objective:   /106 (BP Location: Left arm, Patient Position: Sitting)   Pulse 84   Ht 1.803 m (5' 11\")   Wt 112.9 kg (249 lb)   SpO2 96%   BMI 34.73 kg/m²     Physical Exam   Constitutional: He is oriented to person, place, and time. No distress.   Eyes: Left eye exhibits no discharge.   Pulmonary/Chest: Effort normal. No respiratory distress.   Musculoskeletal: He exhibits no edema.   Neurological: He is alert and oriented to person, place, and time.   Skin: Skin is warm. No erythema.   Psychiatric: He has a normal mood and affect.       Assessment:     1. Persistent atrial fibrillation (HCC)     2. Chronic anticoagulation     3. Essential hypertension     4. Mixed hyperlipidemia         Medical Decision Making:  Today's Assessment / Status / Plan: "     He is doing relatively well but blood pressure is somewhat elevated today.  He also quite overweight with likely contributing to its hypertension.  With his complaint of erectile dysfunction we decided to discontinue his metoprolol.  I started him on losartan hydrochlorothiazide 100-12.5 mg daily and increase his verapamil SR to 240 mg daily.  He was advised to monitor his blood pressure at home and to contact us if it mostly runs out of range.  We also gave him prescription of sildenafil 25 mg on as-needed basis.  He was again advised to try to lose weight with a goal of 15 to 20 pounds the next 6 months.  We will see him back in about 6 months for follow-up with chemistry and lipid profile prior to next visit.  Thank you for allowing us to participate in care of this patient.

## 2019-06-05 NOTE — LETTER
[facility]   [address]  [place] [address]  Phone: [phone]  Fax: [phone]              Kip Harper  1950    Encounter Date: 6/5/2019    Ct Hess M.D.          PROGRESS NOTE:  Chief Complaint   Patient presents with   • Atrial Fibrillation     follow up   Anticoagulation  HTN  Dyslipidemia    Subjective:   Kip Harper is a 69 y.o. male who presents today for follow-up of above issues    The patient is doing well from cardiac standpoint.   He denies any chest pain, palpitation or heart failure type symptoms.  Denies any side effect from medications.  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;  Service:      Family History   Problem Relation Age of Onset   • Heart Disease Mother    • Diabetes Mother    • Heart Disease Father    • Lung Disease Father      Social History     Social History   • Marital status:      Spouse name: N/A   • Number of children: N/A   • Years of education: N/A     Occupational History   • Not on file.     Social History Main Topics   • Smoking status: Never Smoker   • Smokeless tobacco: Never Used   • Alcohol use No   • Drug use: No   • Sexual activity: Not on file     Other Topics Concern   • Not on file     Social History Narrative   • No narrative on file     Allergies   Allergen Reactions   • No Known Drug Allergy      Outpatient Encounter Prescriptions as of 6/5/2019   Medication Sig Dispense Refill   •  "apixaban (ELIQUIS) 5mg Tab Take 1 Tab by mouth 2 Times a Day. 60 Tab 11   • pravastatin (PRAVACHOL) 40 MG tablet TAKE 1 TABLET BY MOUTH IN THE EVENING 90 Tab 3   • verapamil ER (CALAN-SR) 180 MG Tab CR TAKE 1 TABLET BY MOUTH EVERY DAY 90 Tab 3   • metoprolol (LOPRESSOR) 50 MG Tab TAKE 1 TAB BY MOUTH 2 TIMES A DAY. 180 Tab 3   • allopurinol (ZYLOPRIM) 100 MG Tab Take 100 mg by mouth every day.  11   • colchicine (COLCRYS) 0.6 MG Tab TAKE 2 TABS AT SIGNS OF GOUT ATTACK, THEN 1 TAB ONE HOUR AFTER FIRST DOSE. DO NOT REPEAT EARLIER THAN 3 DAYS. (Patient not taking: Reported on 10/13/2017) 30 Tab 0   • potassium chloride SA (K-DUR) 20 MEQ Tab CR Take 2 Tabs by mouth every day. (Patient not taking: Reported on 10/13/2017) 60 Tab 1   • pravastatin (PRAVACHOL) 40 MG tablet Take 40 mg by mouth every evening.       No facility-administered encounter medications on file as of 6/5/2019.      Review of Systems   Constitutional: Negative for malaise/fatigue.   HENT: Negative for nosebleeds.    Respiratory: Negative for shortness of breath.    Cardiovascular: Negative for chest pain, palpitations and leg swelling.   Gastrointestinal: Negative for blood in stool.   Genitourinary: Negative for hematuria.        Nocturia, thinks he drinks too much water  C/o ED, requesting sildenafil   Neurological: Negative for dizziness.   Endo/Heme/Allergies: Does not bruise/bleed easily.   Psychiatric/Behavioral: The patient does not have insomnia.         Objective:   /106 (BP Location: Left arm, Patient Position: Sitting)   Pulse 84   Ht 1.803 m (5' 11\")   Wt 112.9 kg (249 lb)   SpO2 96%   BMI 34.73 kg/m²      Physical Exam   Constitutional: He is oriented to person, place, and time. No distress.   Eyes: Left eye exhibits no discharge.   Pulmonary/Chest: Effort normal. No respiratory distress.   Musculoskeletal: He exhibits no edema.   Neurological: He is alert and oriented to person, place, and time.   Skin: Skin is warm. No " erythema.   Psychiatric: He has a normal mood and affect.       Assessment:     1. Persistent atrial fibrillation (HCC)     2. Chronic anticoagulation     3. Essential hypertension     4. Mixed hyperlipidemia         Medical Decision Making:  Today's Assessment / Status / Plan:     He is doing relatively well but blood pressure is somewhat elevated today.  He also quite overweight with likely contributing to its hypertension.  With his complaint of erectile dysfunction we decided to discontinue his metoprolol.  I started him on losartan hydrochlorothiazide 100-12.5 mg daily and increase his verapamil SR to 240 mg daily.  He was advised to monitor his blood pressure at home and to contact us if it mostly runs out of range.  We also gave him prescription of sildenafil 25 mg on as-needed basis.  He was again advised to try to lose weight with a goal of 15 to 20 pounds the next 6 months.  We will see him back in about 6 months for follow-up with chemistry and lipid profile prior to next visit.  Thank you for allowing us to participate in care of this patient.      No Recipients

## 2019-08-21 ENCOUNTER — HOSPITAL ENCOUNTER (OUTPATIENT)
Dept: LAB | Facility: MEDICAL CENTER | Age: 69
End: 2019-08-21
Attending: FAMILY MEDICINE
Payer: COMMERCIAL

## 2019-08-21 ENCOUNTER — TELEPHONE (OUTPATIENT)
Dept: CARDIOLOGY | Facility: MEDICAL CENTER | Age: 69
End: 2019-08-21

## 2019-08-21 LAB
ALBUMIN SERPL BCP-MCNC: 4.4 G/DL (ref 3.2–4.9)
ALBUMIN/GLOB SERPL: 1.4 G/DL
ALP SERPL-CCNC: 44 U/L (ref 30–99)
ALT SERPL-CCNC: 14 U/L (ref 2–50)
ANION GAP SERPL CALC-SCNC: 10 MMOL/L (ref 0–11.9)
APPEARANCE UR: CLEAR
AST SERPL-CCNC: 23 U/L (ref 12–45)
BASOPHILS # BLD AUTO: 1.1 % (ref 0–1.8)
BASOPHILS # BLD: 0.07 K/UL (ref 0–0.12)
BILIRUB SERPL-MCNC: 0.7 MG/DL (ref 0.1–1.5)
BILIRUB UR QL STRIP.AUTO: NEGATIVE
BUN SERPL-MCNC: 18 MG/DL (ref 8–22)
CALCIUM SERPL-MCNC: 9.8 MG/DL (ref 8.5–10.5)
CHLORIDE SERPL-SCNC: 101 MMOL/L (ref 96–112)
CHOLEST SERPL-MCNC: 179 MG/DL (ref 100–199)
CO2 SERPL-SCNC: 25 MMOL/L (ref 20–33)
COLOR UR: YELLOW
CREAT SERPL-MCNC: 0.94 MG/DL (ref 0.5–1.4)
EOSINOPHIL # BLD AUTO: 0.12 K/UL (ref 0–0.51)
EOSINOPHIL NFR BLD: 1.9 % (ref 0–6.9)
ERYTHROCYTE [DISTWIDTH] IN BLOOD BY AUTOMATED COUNT: 47.3 FL (ref 35.9–50)
EST. AVERAGE GLUCOSE BLD GHB EST-MCNC: 140 MG/DL
GLOBULIN SER CALC-MCNC: 3.2 G/DL (ref 1.9–3.5)
GLUCOSE SERPL-MCNC: 94 MG/DL (ref 65–99)
GLUCOSE UR STRIP.AUTO-MCNC: NEGATIVE MG/DL
HBA1C MFR BLD: 6.5 % (ref 0–5.6)
HCT VFR BLD AUTO: 49.7 % (ref 42–52)
HDLC SERPL-MCNC: 46 MG/DL
HGB BLD-MCNC: 16.2 G/DL (ref 14–18)
IMM GRANULOCYTES # BLD AUTO: 0.01 K/UL (ref 0–0.11)
IMM GRANULOCYTES NFR BLD AUTO: 0.2 % (ref 0–0.9)
KETONES UR STRIP.AUTO-MCNC: NEGATIVE MG/DL
LDLC SERPL CALC-MCNC: 104 MG/DL
LEUKOCYTE ESTERASE UR QL STRIP.AUTO: NEGATIVE
LYMPHOCYTES # BLD AUTO: 1.93 K/UL (ref 1–4.8)
LYMPHOCYTES NFR BLD: 30.6 % (ref 22–41)
MCH RBC QN AUTO: 31.4 PG (ref 27–33)
MCHC RBC AUTO-ENTMCNC: 32.6 G/DL (ref 33.7–35.3)
MCV RBC AUTO: 96.3 FL (ref 81.4–97.8)
MICRO URNS: NORMAL
MONOCYTES # BLD AUTO: 0.73 K/UL (ref 0–0.85)
MONOCYTES NFR BLD AUTO: 11.6 % (ref 0–13.4)
NEUTROPHILS # BLD AUTO: 3.45 K/UL (ref 1.82–7.42)
NEUTROPHILS NFR BLD: 54.6 % (ref 44–72)
NITRITE UR QL STRIP.AUTO: NEGATIVE
NRBC # BLD AUTO: 0 K/UL
NRBC BLD-RTO: 0 /100 WBC
PH UR STRIP.AUTO: 5.5 [PH] (ref 5–8)
PLATELET # BLD AUTO: 230 K/UL (ref 164–446)
PMV BLD AUTO: 10.3 FL (ref 9–12.9)
POTASSIUM SERPL-SCNC: 4.2 MMOL/L (ref 3.6–5.5)
PROT SERPL-MCNC: 7.6 G/DL (ref 6–8.2)
PROT UR QL STRIP: NEGATIVE MG/DL
PSA SERPL-MCNC: 1.33 NG/ML (ref 0–4)
RBC # BLD AUTO: 5.16 M/UL (ref 4.7–6.1)
RBC UR QL AUTO: NEGATIVE
SODIUM SERPL-SCNC: 136 MMOL/L (ref 135–145)
SP GR UR STRIP.AUTO: 1.01
TRIGL SERPL-MCNC: 147 MG/DL (ref 0–149)
URATE SERPL-MCNC: 10.2 MG/DL (ref 2.5–8.3)
UROBILINOGEN UR STRIP.AUTO-MCNC: 0.2 MG/DL
WBC # BLD AUTO: 6.3 K/UL (ref 4.8–10.8)

## 2019-08-21 PROCEDURE — 80053 COMPREHEN METABOLIC PANEL: CPT

## 2019-08-21 PROCEDURE — 84153 ASSAY OF PSA TOTAL: CPT | Mod: GA

## 2019-08-21 PROCEDURE — 81003 URINALYSIS AUTO W/O SCOPE: CPT

## 2019-08-21 PROCEDURE — 85025 COMPLETE CBC W/AUTO DIFF WBC: CPT

## 2019-08-21 PROCEDURE — 36415 COLL VENOUS BLD VENIPUNCTURE: CPT

## 2019-08-21 PROCEDURE — 84550 ASSAY OF BLOOD/URIC ACID: CPT

## 2019-08-21 PROCEDURE — 80061 LIPID PANEL: CPT

## 2019-08-21 PROCEDURE — 83036 HEMOGLOBIN GLYCOSYLATED A1C: CPT | Mod: GA

## 2019-08-22 ENCOUNTER — HOSPITAL ENCOUNTER (OUTPATIENT)
Facility: MEDICAL CENTER | Age: 69
End: 2019-08-22
Attending: FAMILY MEDICINE
Payer: COMMERCIAL

## 2019-08-22 LAB — HEMOCCULT STL QL: NEGATIVE

## 2019-08-22 PROCEDURE — 82272 OCCULT BLD FECES 1-3 TESTS: CPT | Mod: GZ

## 2019-08-22 NOTE — TELEPHONE ENCOUNTER
Upon review of sample availability with [facility], Eliquis is available in 5mg.    Sample request sent to [facility].    Called pt unable to reach.  Left voicemail with findings and to return this call if he has any questions or concerns.

## 2019-11-25 ENCOUNTER — TELEPHONE (OUTPATIENT)
Dept: CARDIOLOGY | Facility: MEDICAL CENTER | Age: 69
End: 2019-11-25

## 2019-11-25 DIAGNOSIS — I10 ESSENTIAL HYPERTENSION, BENIGN: ICD-10-CM

## 2019-11-25 DIAGNOSIS — E78.2 MIXED HYPERLIPIDEMIA: ICD-10-CM

## 2019-11-25 NOTE — TELEPHONE ENCOUNTER
Called patient and LVM letting him know that CR would like him to complete fasting labs prior to follow-up on 12/5/19. Informed him that YESI Chakraborty sent lab orders in mail.      Asked that he call back if he has any questions or concerns -NICHOL

## 2019-11-25 NOTE — LETTER
June 30, 2020     iKp Harper  [address]  [place] [address]      Dear Kip:  Below are the results from your recent visit:  Resulted Orders   Comp Metabolic Panel   Result Value Ref Range    Glucose 109 (H) 65 - 99 mg/dL    Bun 22 8 - 27 mg/dL    Creatinine 1.11 0.76 - 1.27 mg/dL    GFR If Non African American 66 >59 mL/min/1.73    GFR If  77 >59 mL/min/1.73    Bun-Creatinine Ratio 20 10 - 24    Sodium 139 134 - 144 mmol/L    Potassium 4.2 3.5 - 5.2 mmol/L    Chloride 99 96 - 106 mmol/L    Co2 22 20 - 29 mmol/L    Calcium 9.3 8.6 - 10.2 mg/dL    Total Protein 7.0 6.0 - 8.5 g/dL    Albumin 4.2 3.7 - 4.7 g/dL    Globulin 2.8 1.5 - 4.5 g/dL    A-G Ratio 1.5 1.2 - 2.2    Total Bilirubin 0.6 0.0 - 1.2 mg/dL    Alkaline Phosphatase 40 39 - 117 IU/L    AST(SGOT) 20 0 - 40 IU/L    ALT(SGPT) 11 0 - 44 IU/L   LIPID PANEL   Result Value Ref Range    Cholesterol,Tot 178 100 - 199 mg/dL    Triglycerides 176 (H) 0 - 149 mg/dL    HDL 46 >39 mg/dL    VLDL Cholesterol Calc 35 5 - 40 mg/dL    LDL 97 0 - 99 mg/dL    Comment: CANCELED       Comment:      Result canceled by the ancillary.     The test results show that you have a slightly elevated blood sugar and your triglycerides are elevated.  If you have any questions or concerns, please don't hesitate to call.    Sincerely,  DESTINY Rojas  Electronically Signed

## 2019-11-25 NOTE — PROGRESS NOTES
"Lab Orders   Received: 6 days ago Message Contents   Octaviano Hua, Ariel Ass't  DESTINY Velarde!     Per CR's last visit note, he would like patient to repeat lipids and chemistry prior to follow-up visit on 12/5/19. Can you please put in orders for lab work and I can give the patient a call to get them done?     Thank you!!! -DLP      Upon chart review, per MD last OV, \"We will see him back in about 6 months for follow-up with chemistry and lipid profile prior to next visit.\"    CMP and Lipid ordered and mailed to pt mailing address.  "

## 2019-12-02 NOTE — TELEPHONE ENCOUNTER
Called patient again and LVM reminding him to complete fasting labs prior to appt with CR on 12/5/19 -DLP

## 2019-12-03 ENCOUNTER — HOSPITAL ENCOUNTER (OUTPATIENT)
Dept: LAB | Facility: MEDICAL CENTER | Age: 69
End: 2019-12-03
Attending: INTERNAL MEDICINE
Payer: COMMERCIAL

## 2019-12-03 DIAGNOSIS — E78.2 MIXED HYPERLIPIDEMIA: ICD-10-CM

## 2019-12-03 DIAGNOSIS — I10 ESSENTIAL HYPERTENSION, BENIGN: ICD-10-CM

## 2019-12-03 LAB
ALBUMIN SERPL BCP-MCNC: 4.2 G/DL (ref 3.2–4.9)
ALBUMIN/GLOB SERPL: 1.4 G/DL
ALP SERPL-CCNC: 38 U/L (ref 30–99)
ALT SERPL-CCNC: 12 U/L (ref 2–50)
ANION GAP SERPL CALC-SCNC: 9 MMOL/L (ref 0–11.9)
AST SERPL-CCNC: 19 U/L (ref 12–45)
BILIRUB SERPL-MCNC: 0.5 MG/DL (ref 0.1–1.5)
BUN SERPL-MCNC: 28 MG/DL (ref 8–22)
CALCIUM SERPL-MCNC: 9 MG/DL (ref 8.5–10.5)
CHLORIDE SERPL-SCNC: 105 MMOL/L (ref 96–112)
CHOLEST SERPL-MCNC: 147 MG/DL (ref 100–199)
CO2 SERPL-SCNC: 25 MMOL/L (ref 20–33)
CREAT SERPL-MCNC: 1.25 MG/DL (ref 0.5–1.4)
GLOBULIN SER CALC-MCNC: 3.1 G/DL (ref 1.9–3.5)
GLUCOSE SERPL-MCNC: 94 MG/DL (ref 65–99)
HDLC SERPL-MCNC: 41 MG/DL
LDLC SERPL CALC-MCNC: 86 MG/DL
POTASSIUM SERPL-SCNC: 3.9 MMOL/L (ref 3.6–5.5)
PROT SERPL-MCNC: 7.3 G/DL (ref 6–8.2)
SODIUM SERPL-SCNC: 139 MMOL/L (ref 135–145)
TRIGL SERPL-MCNC: 98 MG/DL (ref 0–149)

## 2019-12-03 PROCEDURE — 80053 COMPREHEN METABOLIC PANEL: CPT

## 2019-12-03 PROCEDURE — 80061 LIPID PANEL: CPT

## 2019-12-03 PROCEDURE — 36415 COLL VENOUS BLD VENIPUNCTURE: CPT

## 2019-12-05 ENCOUNTER — OFFICE VISIT (OUTPATIENT)
Dept: CARDIOLOGY | Facility: MEDICAL CENTER | Age: 69
End: 2019-12-05
Payer: COMMERCIAL

## 2019-12-05 VITALS
OXYGEN SATURATION: 95 % | HEIGHT: 71 IN | DIASTOLIC BLOOD PRESSURE: 70 MMHG | SYSTOLIC BLOOD PRESSURE: 116 MMHG | BODY MASS INDEX: 33.6 KG/M2 | WEIGHT: 240 LBS | HEART RATE: 72 BPM

## 2019-12-05 DIAGNOSIS — I48.19 PERSISTENT ATRIAL FIBRILLATION (HCC): ICD-10-CM

## 2019-12-05 DIAGNOSIS — E78.2 MIXED HYPERLIPIDEMIA: ICD-10-CM

## 2019-12-05 DIAGNOSIS — I10 ESSENTIAL HYPERTENSION, BENIGN: ICD-10-CM

## 2019-12-05 DIAGNOSIS — Z79.01 CHRONIC ANTICOAGULATION: ICD-10-CM

## 2019-12-05 DIAGNOSIS — I34.0 NON-RHEUMATIC MITRAL REGURGITATION: ICD-10-CM

## 2019-12-05 PROCEDURE — 99214 OFFICE O/P EST MOD 30 MIN: CPT | Performed by: INTERNAL MEDICINE

## 2019-12-05 ASSESSMENT — ENCOUNTER SYMPTOMS
MYALGIAS: 0
PALPITATIONS: 0
BLOOD IN STOOL: 0
DIZZINESS: 0
INSOMNIA: 0
SHORTNESS OF BREATH: 0
BLURRED VISION: 0
WEIGHT LOSS: 1

## 2019-12-05 NOTE — PROGRESS NOTES
Chief Complaint   Patient presents with   • Atrial Fibrillation     follow up   Anticoagulation  HTN  Dyslipidemia    Subjective:   Kip Harper is a 69 y.o. male who presents today for f/u above issues    The patient is doing well from cardiac standpoint.   He denies any chest pain, palpitation or heart failure type symptoms.  Denies any side effect from medications.    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      Past Surgical History:   Procedure Laterality Date   • GASTROSCOPY-ENDO  8/11/2016    Procedure: GASTROSCOPY-ENDO;  Surgeon: Jones Moyer M.D.;  Location: [facility]COPY [facility]  Service:      Family History   Problem Relation Age of Onset   • Heart Disease Mother    • Diabetes Mother    • Heart Disease Father    • Lung Disease Father      Social History     Socioeconomic History   • Marital status:      Spouse name: Not on file   • Number of children: Not on file   • Years of education: Not on file   • Highest education level: Not on file   Occupational History   • Not on file   Social Needs   • Financial resource strain: Not on file   • Food insecurity:     Worry: Not on file     Inability: Not on file   • Transportation needs:     Medical: Not on file     Non-medical: Not on file   Tobacco Use   • Smoking status: Never Smoker   • Smokeless tobacco: Never Used   Substance and Sexual Activity   • Alcohol use: No   • Drug use: No   • Sexual activity: Not on file   Lifestyle   • Physical activity:     Days per week: Not on file     Minutes per session: Not on file   • Stress: Not on file   Relationships   • Social connections:     Talks on phone: Not on file     Gets together: Not on file     Attends Mandaen service: Not on file     Active member of club or organization: Not on file      "Attends meetings of clubs or organizations: Not on file     Relationship status: Not on file   • Intimate partner violence:     Fear of current or ex partner: Not on file     Emotionally abused: Not on file     Physically abused: Not on file     Forced sexual activity: Not on file   Other Topics Concern   • Not on file   Social History Narrative   • Not on file     Allergies   Allergen Reactions   • No Known Drug Allergy      Outpatient Encounter Medications as of 12/5/2019   Medication Sig Dispense Refill   • pravastatin (PRAVACHOL) 40 MG tablet TAKE 1 TABLET BY MOUTH EVERY DAY IN THE EVENING 90 Tab 3   • apixaban (ELIQUIS) 5mg Tab Take 1 Tab by mouth 2 Times a Day. SAMPLE 60 Tab 0   • verapamil ER (CALAN-SR) 240 MG Tab CR Take 1 Tab by mouth every day. 30 Tab 5   • losartan-hydrochlorothiazide (HYZAAR) 100-12.5 MG per tablet Take 1 Tab by mouth every day. 30 Tab 5   • sildenafil citrate (VIAGRA) 25 MG Tab Take 1 Tab by mouth as needed for Erectile Dysfunction. 5 Tab 3   • allopurinol (ZYLOPRIM) 100 MG Tab Take 100 mg by mouth every day.  11     No facility-administered encounter medications on file as of 12/5/2019.      Review of Systems   Constitutional: Positive for weight loss.        Able to lose 10 lbs   HENT: Negative for nosebleeds.    Eyes: Negative for blurred vision.   Respiratory: Negative for shortness of breath.    Cardiovascular: Negative for chest pain, palpitations and leg swelling.   Gastrointestinal: Negative for blood in stool.   Genitourinary: Negative for hematuria.   Musculoskeletal: Negative for myalgias.   Neurological: Negative for dizziness.   Psychiatric/Behavioral: The patient does not have insomnia.         Objective:   /70 (BP Location: Left arm, Patient Position: Sitting)   Pulse 72   Ht 1.803 m (5' 11\")   Wt 108.9 kg (240 lb)   SpO2 95%   BMI 33.47 kg/m²     Physical Exam   Constitutional: He is oriented to person, place, and time. No distress.   HENT:   Head: Atraumatic. "   Eyes: Left eye exhibits no discharge.   Neck: No JVD present. No thyromegaly present.   Cardiovascular: Normal rate. An irregularly irregular rhythm present. Exam reveals distant heart sounds. Exam reveals no gallop.   No murmur heard.  Pulmonary/Chest: Effort normal and breath sounds normal.   Abdominal: Soft.   Musculoskeletal:         General: No edema.   Neurological: He is alert and oriented to person, place, and time.   Skin: Skin is warm.   Psychiatric: He has a normal mood and affect. His behavior is normal.     Labs 12/3 CMP NL except BUN 28  LDL 86, HDL 41      Assessment:     1. Essential hypertension, benign     2. Mixed hyperlipidemia     3. Persistent atrial fibrillation     4. Chronic anticoagulation     5. Non-rheumatic mitral regurgitation         Medical Decision Making:  Today's Assessment / Status / Plan:     The patient's above cardiovascular conditions are stable.   Will continue current medications and have the patient return for a followup in 6 months.   Will be happy to see the patient sooner as needed.   Thank you for allowing me to participate in the caring of this patient.

## 2019-12-14 DIAGNOSIS — I48.0 PAROXYSMAL ATRIAL FIBRILLATION (HCC): Primary | ICD-10-CM

## 2019-12-16 DIAGNOSIS — I10 ESSENTIAL HYPERTENSION, BENIGN: ICD-10-CM

## 2019-12-16 RX ORDER — VERAPAMIL HYDROCHLORIDE 240 MG/1
240 TABLET, FILM COATED, EXTENDED RELEASE ORAL DAILY
Qty: 30 TAB | Refills: 5 | Status: SHIPPED | OUTPATIENT
Start: 2019-12-16 | End: 2020-01-06 | Stop reason: SDUPTHER

## 2019-12-16 RX ORDER — LOSARTAN POTASSIUM AND HYDROCHLOROTHIAZIDE 12.5; 1 MG/1; MG/1
TABLET ORAL
Qty: 30 TAB | Refills: 5 | Status: SHIPPED | OUTPATIENT
Start: 2019-12-16 | End: 2020-01-03 | Stop reason: SDUPTHER

## 2020-01-03 ENCOUNTER — TELEPHONE (OUTPATIENT)
Dept: CARDIOLOGY | Facility: MEDICAL CENTER | Age: 70
End: 2020-01-03

## 2020-01-03 DIAGNOSIS — E78.5 HYPERLIPIDEMIA, UNSPECIFIED HYPERLIPIDEMIA TYPE: ICD-10-CM

## 2020-01-03 DIAGNOSIS — I10 ESSENTIAL HYPERTENSION, BENIGN: ICD-10-CM

## 2020-01-03 DIAGNOSIS — I48.0 PAROXYSMAL ATRIAL FIBRILLATION (HCC): ICD-10-CM

## 2020-01-03 RX ORDER — LOSARTAN POTASSIUM AND HYDROCHLOROTHIAZIDE 12.5; 1 MG/1; MG/1
1 TABLET ORAL
Qty: 90 TAB | Refills: 3 | Status: SHIPPED | OUTPATIENT
Start: 2020-01-03 | End: 2020-06-22 | Stop reason: SDUPTHER

## 2020-01-03 RX ORDER — PRAVASTATIN SODIUM 40 MG
40 TABLET ORAL
Qty: 90 TAB | Refills: 3 | Status: SHIPPED | OUTPATIENT
Start: 2020-01-03 | End: 2020-06-22 | Stop reason: SDUPTHER

## 2020-01-04 NOTE — TELEPHONE ENCOUNTER
CR    Patient called to see about getting a sample of their Eliquis because they just switched insurance and it will take awhile for the new insurance to authorize the medication and send it out. Please call the patient back at [phone].

## 2020-01-04 NOTE — TELEPHONE ENCOUNTER
Medication Refill   Sanna Avendaño, Med Ass't  You 1 hour ago (4:16 PM)      I called the patient and informed him that there are no samples, he is requesting to switch to the cheaper alternative please   Thank you      Pt request relayed to MD for advise.

## 2020-01-06 DIAGNOSIS — I10 ESSENTIAL HYPERTENSION, BENIGN: ICD-10-CM

## 2020-01-06 DIAGNOSIS — I48.0 PAROXYSMAL ATRIAL FIBRILLATION (HCC): ICD-10-CM

## 2020-01-06 RX ORDER — VERAPAMIL HYDROCHLORIDE 240 MG/1
240 TABLET, FILM COATED, EXTENDED RELEASE ORAL DAILY
Qty: 90 TAB | Refills: 3 | Status: SHIPPED | OUTPATIENT
Start: 2020-01-06 | End: 2020-06-22 | Stop reason: SDUPTHER

## 2020-01-07 NOTE — TELEPHONE ENCOUNTER
Medication Refill   Ct Hess M.D.  You 3 days ago      We can check the price for Xarelto but as you know there isn't another option except switching to warfarin      Returned pt call and reviewed MD recommendations.  Pt verbalizes understanding and states he will follow up with his insurance and pharmacy to discuss pricing for Xarelto.  Encourage pt to return this call with his decision on medication.  He verbalizes understanding and states no other concerns or questions at this time.  Pt is appreciative of information given.    Will await for pt to return this call with his decision.

## 2020-06-09 ENCOUNTER — TELEPHONE (OUTPATIENT)
Dept: CARDIOLOGY | Facility: MEDICAL CENTER | Age: 70
End: 2020-06-09

## 2020-06-09 DIAGNOSIS — E78.2 MIXED HYPERLIPIDEMIA: ICD-10-CM

## 2020-06-09 DIAGNOSIS — I10 ESSENTIAL HYPERTENSION, BENIGN: ICD-10-CM

## 2020-06-09 NOTE — TELEPHONE ENCOUNTER
CMP and Lipid ordered, slips printed and faxed [facility] - [address], [phone], completed status.    Lab slip mailed to pt mailing address.    Called pt and reviewed findings.  He verbalizes understanding and states no other concerns or questions at this time.  Pt is appreciative of information given.

## 2020-06-09 NOTE — TELEPHONE ENCOUNTER
SONIA/vandana    Pt calling for lab orders and have lab slip faxed to [facility] on [address].  Pt asking for a call to tell him which blood tests will be ordered and what prep there is.    Please call Kip

## 2020-06-16 LAB
ALBUMIN SERPL-MCNC: 4.2 G/DL (ref 3.7–4.7)
ALBUMIN/GLOB SERPL: 1.5 {RATIO} (ref 1.2–2.2)
ALP SERPL-CCNC: 40 IU/L (ref 39–117)
ALT SERPL-CCNC: 11 IU/L (ref 0–44)
AST SERPL-CCNC: 20 IU/L (ref 0–40)
BILIRUB SERPL-MCNC: 0.6 MG/DL (ref 0–1.2)
BUN SERPL-MCNC: 22 MG/DL (ref 8–27)
BUN/CREAT SERPL: 20 (ref 10–24)
CALCIUM SERPL-MCNC: 9.3 MG/DL (ref 8.6–10.2)
CHLORIDE SERPL-SCNC: 99 MMOL/L (ref 96–106)
CHOLEST SERPL-MCNC: 178 MG/DL (ref 100–199)
CO2 SERPL-SCNC: 22 MMOL/L (ref 20–29)
CREAT SERPL-MCNC: 1.11 MG/DL (ref 0.76–1.27)
GLOBULIN SER CALC-MCNC: 2.8 G/DL (ref 1.5–4.5)
GLUCOSE SERPL-MCNC: 109 MG/DL (ref 65–99)
HDLC SERPL-MCNC: 46 MG/DL
LABORATORY COMMENT REPORT: ABNORMAL
LDLC SERPL CALC-MCNC: 97 MG/DL (ref 0–99)
POTASSIUM SERPL-SCNC: 4.2 MMOL/L (ref 3.5–5.2)
PROT SERPL-MCNC: 7 G/DL (ref 6–8.5)
SODIUM SERPL-SCNC: 139 MMOL/L (ref 134–144)
TRIGL SERPL-MCNC: 176 MG/DL (ref 0–149)
VLDLC SERPL CALC-MCNC: 35 MG/DL (ref 5–40)

## 2020-06-22 DIAGNOSIS — E78.5 HYPERLIPIDEMIA, UNSPECIFIED HYPERLIPIDEMIA TYPE: ICD-10-CM

## 2020-06-22 DIAGNOSIS — I48.0 PAROXYSMAL ATRIAL FIBRILLATION (HCC): ICD-10-CM

## 2020-06-22 DIAGNOSIS — I10 ESSENTIAL HYPERTENSION, BENIGN: ICD-10-CM

## 2020-06-22 RX ORDER — PRAVASTATIN SODIUM 40 MG
40 TABLET ORAL EVERY EVENING
Qty: 90 TAB | Refills: 1 | Status: SHIPPED | OUTPATIENT
Start: 2020-06-22 | End: 2021-01-18

## 2020-06-22 RX ORDER — LOSARTAN POTASSIUM AND HYDROCHLOROTHIAZIDE 12.5; 1 MG/1; MG/1
1 TABLET ORAL
Qty: 90 TAB | Refills: 1 | Status: SHIPPED | OUTPATIENT
Start: 2020-06-22 | End: 2021-01-18 | Stop reason: SDUPTHER

## 2020-06-22 RX ORDER — VERAPAMIL HYDROCHLORIDE 240 MG/1
240 TABLET, FILM COATED, EXTENDED RELEASE ORAL DAILY
Qty: 90 TAB | Refills: 1 | Status: SHIPPED | OUTPATIENT
Start: 2020-06-22 | End: 2021-01-18 | Stop reason: SDUPTHER

## 2020-06-25 ENCOUNTER — APPOINTMENT (OUTPATIENT)
Dept: CARDIOLOGY | Facility: MEDICAL CENTER | Age: 70
End: 2020-06-25
Payer: MEDICARE

## 2021-01-15 DIAGNOSIS — Z23 NEED FOR VACCINATION: ICD-10-CM

## 2021-01-17 DIAGNOSIS — E78.5 HYPERLIPIDEMIA, UNSPECIFIED HYPERLIPIDEMIA TYPE: ICD-10-CM

## 2021-01-17 DIAGNOSIS — I10 ESSENTIAL HYPERTENSION, BENIGN: ICD-10-CM

## 2021-01-18 RX ORDER — LOSARTAN POTASSIUM AND HYDROCHLOROTHIAZIDE 12.5; 1 MG/1; MG/1
1 TABLET ORAL
Qty: 90 TAB | Refills: 0 | Status: SHIPPED | OUTPATIENT
Start: 2021-01-18 | End: 2021-01-20 | Stop reason: SDUPTHER

## 2021-01-18 RX ORDER — PRAVASTATIN SODIUM 40 MG
40 TABLET ORAL DAILY
Qty: 90 TAB | Refills: 0 | Status: SHIPPED | OUTPATIENT
Start: 2021-01-18 | End: 2021-01-20 | Stop reason: SDUPTHER

## 2021-01-18 RX ORDER — VERAPAMIL HYDROCHLORIDE 240 MG/1
240 TABLET, FILM COATED, EXTENDED RELEASE ORAL DAILY
Qty: 90 TAB | Refills: 0 | Status: SHIPPED | OUTPATIENT
Start: 2021-01-18 | End: 2021-01-20 | Stop reason: SDUPTHER

## 2021-01-18 NOTE — TELEPHONE ENCOUNTER
NM: Kip Harper   PH: [phone]   PT NM: Patricia Harper   : 1950   RE: Returning call from office, not  sure what in regards to.      --------------------------------------  Message History  Account: [id number]  Taken:  2021  3:29p   Serial#: [id number]

## 2021-01-18 NOTE — TELEPHONE ENCOUNTER
Received walk-in form received from [facility] as pt is requesting for medications to be refilled:    Pravastatin, verapamil, Losartan-HCTZ, and allopurinol  to be sent to [facility]    Cardiac medications sent to preferred pharmacy as requested.    Called pt, unable to reach.  Left detailed voicemail regarding findings and to defer allopurinol rx to PCP.    Form sent to scanning for reference.

## 2021-01-19 DIAGNOSIS — E78.5 HYPERLIPIDEMIA, UNSPECIFIED HYPERLIPIDEMIA TYPE: ICD-10-CM

## 2021-01-20 ENCOUNTER — OFFICE VISIT (OUTPATIENT)
Dept: CARDIOLOGY | Facility: MEDICAL CENTER | Age: 71
End: 2021-01-20
Payer: MEDICARE

## 2021-01-20 VITALS
BODY MASS INDEX: 33.86 KG/M2 | SYSTOLIC BLOOD PRESSURE: 118 MMHG | HEIGHT: 72 IN | WEIGHT: 250 LBS | HEART RATE: 74 BPM | OXYGEN SATURATION: 97 % | DIASTOLIC BLOOD PRESSURE: 80 MMHG

## 2021-01-20 DIAGNOSIS — E78.00 PURE HYPERCHOLESTEROLEMIA: ICD-10-CM

## 2021-01-20 DIAGNOSIS — I34.0 NON-RHEUMATIC MITRAL REGURGITATION: ICD-10-CM

## 2021-01-20 DIAGNOSIS — Z79.01 CHRONIC ANTICOAGULATION: ICD-10-CM

## 2021-01-20 DIAGNOSIS — I48.19 PERSISTENT ATRIAL FIBRILLATION (HCC): ICD-10-CM

## 2021-01-20 DIAGNOSIS — I10 ESSENTIAL HYPERTENSION, BENIGN: ICD-10-CM

## 2021-01-20 PROCEDURE — 99214 OFFICE O/P EST MOD 30 MIN: CPT | Performed by: INTERNAL MEDICINE

## 2021-01-20 RX ORDER — ALLOPURINOL 100 MG/1
100 TABLET ORAL
Qty: 90 TAB | Refills: 3 | Status: SHIPPED | OUTPATIENT
Start: 2021-01-20

## 2021-01-20 RX ORDER — VERAPAMIL HYDROCHLORIDE 240 MG/1
240 TABLET, FILM COATED, EXTENDED RELEASE ORAL DAILY
Qty: 90 TAB | Refills: 3 | Status: SHIPPED | OUTPATIENT
Start: 2021-01-20 | End: 2021-10-26 | Stop reason: SDUPTHER

## 2021-01-20 RX ORDER — LOSARTAN POTASSIUM AND HYDROCHLOROTHIAZIDE 12.5; 1 MG/1; MG/1
1 TABLET ORAL
Qty: 90 TAB | Refills: 3 | Status: SHIPPED | OUTPATIENT
Start: 2021-01-20 | End: 2021-01-29

## 2021-01-20 RX ORDER — PRAVASTATIN SODIUM 40 MG
40 TABLET ORAL DAILY
Qty: 90 TAB | Refills: 3 | Status: SHIPPED | OUTPATIENT
Start: 2021-01-20 | End: 2021-06-23 | Stop reason: SDUPTHER

## 2021-01-20 ASSESSMENT — ENCOUNTER SYMPTOMS
DOUBLE VISION: 0
RESPIRATORY NEGATIVE: 1
MYALGIAS: 0
BLURRED VISION: 0
BLOOD IN STOOL: 0
DIZZINESS: 0
BRUISES/BLEEDS EASILY: 0
CARDIOVASCULAR NEGATIVE: 1

## 2021-01-20 ASSESSMENT — FIBROSIS 4 INDEX: FIB4 SCORE: 1.86

## 2021-01-20 NOTE — PROGRESS NOTES
Chief Complaint   Patient presents with   • Atrial Fibrillation     follow up   Anticoagulation  HTN    Subjective:   Kip Harper is a 70 y.o. male who presents today follow-up above issues      The patient is doing well from cardiac standpoint.  Denies chest pain, palpitations, syncope or HF symptoms.  Denies side effects from cardiac medications.    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      Past Surgical History:   Procedure Laterality Date   • GASTROSCOPY-ENDO  8/11/2016    Procedure: GASTROSCOPY-ENDO;  Surgeon: Jones Moyer M.D.;  Location: ENDOSCOPY [facility];  Service:      Family History   Problem Relation Age of Onset   • Heart Disease Mother    • Diabetes Mother    • Heart Disease Father    • Lung Disease Father      Social History     Socioeconomic History   • Marital status:      Spouse name: Not on file   • Number of children: Not on file   • Years of education: Not on file   • Highest education level: Not on file   Occupational History   • Not on file   Social Needs   • Financial resource strain: Not on file   • Food insecurity     Worry: Not on file     Inability: Not on file   • Transportation needs     Medical: Not on file     Non-medical: Not on file   Tobacco Use   • Smoking status: Never Smoker   • Smokeless tobacco: Never Used   Substance and Sexual Activity   • Alcohol use: No   • Drug use: No   • Sexual activity: Not on file   Lifestyle   • Physical activity     Days per week: Not on file     Minutes per session: Not on file   • Stress: Not on file   Relationships   • Social connections     Talks on phone: Not on file     Gets together: Not on file     Attends [employer]an service: Not on file     Active member of club or  organization: Not on file     Attends meetings of clubs or organizations: Not on file     Relationship status: Not on file   • Intimate partner violence     Fear of current or ex partner: Not on file     Emotionally abused: Not on file     Physically abused: Not on file     Forced sexual activity: Not on file   Other Topics Concern   • Not on file   Social History Narrative   • Not on file     Allergies   Allergen Reactions   • No Known Drug Allergy      Outpatient Encounter Medications as of 1/20/2021   Medication Sig Dispense Refill   • allopurinol (ZYLOPRIM) 100 MG Tab Take 1 Tab by mouth every day. 90 Tab 3   • apixaban (ELIQUIS) 5mg Tab Take 1 Tab by mouth 2 Times a Day. 180 Tab 3   • losartan-hydrochlorothiazide (HYZAAR) 100-12.5 MG per tablet Take 1 Tab by mouth every day. **MUST BE SEEN FOR FURTHER REFILLS, THANK YOU.** 90 Tab 3   • verapamil ER (CALAN-SR) 240 MG Tab CR Take 1 Tab by mouth every day. Hold for SBP less than 100, or HR less than 55.  **MUST BE SEEN FOR FURTHER REFILLS, THANK YOU.** 90 Tab 3   • pravastatin (PRAVACHOL) 40 MG tablet Take 1 Tab by mouth every day. **MUST BE SEEN FOR FURTHER REFILLS, THANK YOU.** 90 Tab 3   • [DISCONTINUED] pravastatin (PRAVACHOL) 40 MG tablet Take 1 Tab by mouth every day. **MUST BE SEEN FOR FURTHER REFILLS, THANK YOU.** 90 Tab 0   • [DISCONTINUED] verapamil ER (CALAN-SR) 240 MG Tab CR Take 1 Tab by mouth every day. Hold for SBP less than 100, or HR less than 55.  **MUST BE SEEN FOR FURTHER REFILLS, THANK YOU.** 90 Tab 0   • [DISCONTINUED] losartan-hydrochlorothiazide (HYZAAR) 100-12.5 MG per tablet Take 1 Tab by mouth every day. **MUST BE SEEN FOR FURTHER REFILLS, THANK YOU.** 90 Tab 0   • [DISCONTINUED] apixaban (ELIQUIS) 5mg Tab Take 1 Tab by mouth 2 Times a Day. 180 Tab 1   • [DISCONTINUED] losartan-hydrochlorothiazide (HYZAAR) 100-12.5 MG per tablet Take 1 Tab by mouth every day. 90 Tab 1   • [DISCONTINUED] pravastatin (PRAVACHOL) 40 MG tablet Take 1 Tab by  "mouth every evening. 90 Tab 1   • [DISCONTINUED] verapamil ER (CALAN-SR) 240 MG Tab CR Take 1 Tab by mouth every day. Hold for SBP less than 100, or HR less than 55 90 Tab 1   • sildenafil citrate (VIAGRA) 25 MG Tab Take 1 Tab by mouth as needed for Erectile Dysfunction. (Patient not taking: Reported on 1/20/2021) 5 Tab 3   • [DISCONTINUED] allopurinol (ZYLOPRIM) 100 MG Tab Take 100 mg by mouth every day.  11     No facility-administered encounter medications on file as of 1/20/2021.      Review of Systems   Constitutional:        Gained weight   HENT: Negative for nosebleeds.    Eyes: Negative for blurred vision and double vision.   Respiratory: Negative.    Cardiovascular: Negative.    Gastrointestinal: Negative for blood in stool.   Genitourinary: Negative for hematuria.   Musculoskeletal: Negative for myalgias.   Neurological: Negative for dizziness.   Endo/Heme/Allergies: Does not bruise/bleed easily.        Objective:   /80   Pulse 74   Ht 1.816 m (5' 11.5\")   Wt 113.4 kg (250 lb)   SpO2 97%   BMI 34.38 kg/m²     Physical Exam   Constitutional: He is oriented to person, place, and time. He appears well-nourished. No distress.   Neck: No JVD present.   Cardiovascular: Normal rate. An irregularly irregular rhythm present. Exam reveals distant heart sounds. Exam reveals no gallop.   No murmur heard.  Pulmonary/Chest: Effort normal and breath sounds normal. No respiratory distress. He has no wheezes. He has no rales.   Abdominal: Soft. He exhibits no distension. There is no abdominal tenderness.   Musculoskeletal:         General: No edema.   Neurological: He is alert and oriented to person, place, and time.   Skin: Skin is warm and dry. No erythema.   Psychiatric: He has a normal mood and affect. His behavior is normal.     Last ECHO 12/2017 mild MR    Labs  6/20 CMP NL except BUN 28, Cr 1.2  LDL 86    Assessment:     1. Persistent atrial fibrillation (HCC)     2. Chronic anticoagulation     3. " Essential hypertension, benign  losartan-hydrochlorothiazide (HYZAAR) 100-12.5 MG per tablet    verapamil ER (CALAN-SR) 240 MG Tab CR    Comp Metabolic Panel   4. Non-rheumatic mitral regurgitation     5. Pure hypercholesterolemia pravastatin (PRAVACHOL) 40 MG tablet    Lipid Profile       Medical Decision Making:  Today's Assessment / Status / Plan:     The patient's above cardiovascular conditions are stable.   Advised to lose some weight. Will obtain CMP and lipid.  Will continue current medications and have the patient return for a followup in 6 months.   Will be happy to see the patient sooner as needed.   Thank you for allowing me to participate in the caring of this patient.

## 2021-01-22 ENCOUNTER — HOSPITAL ENCOUNTER (OUTPATIENT)
Dept: LAB | Facility: MEDICAL CENTER | Age: 71
End: 2021-01-22
Attending: INTERNAL MEDICINE
Payer: MEDICARE

## 2021-01-22 DIAGNOSIS — I10 ESSENTIAL HYPERTENSION, BENIGN: ICD-10-CM

## 2021-01-22 DIAGNOSIS — E78.00 PURE HYPERCHOLESTEROLEMIA: ICD-10-CM

## 2021-01-22 LAB
ALBUMIN SERPL BCP-MCNC: 4.3 G/DL (ref 3.2–4.9)
ALBUMIN/GLOB SERPL: 1.1 G/DL
ALP SERPL-CCNC: 41 U/L (ref 30–99)
ALT SERPL-CCNC: 12 U/L (ref 2–50)
ANION GAP SERPL CALC-SCNC: 13 MMOL/L (ref 7–16)
AST SERPL-CCNC: 22 U/L (ref 12–45)
BILIRUB SERPL-MCNC: 0.5 MG/DL (ref 0.1–1.5)
BUN SERPL-MCNC: 30 MG/DL (ref 8–22)
CALCIUM SERPL-MCNC: 9.5 MG/DL (ref 8.5–10.5)
CHLORIDE SERPL-SCNC: 94 MMOL/L (ref 96–112)
CHOLEST SERPL-MCNC: 145 MG/DL (ref 100–199)
CO2 SERPL-SCNC: 21 MMOL/L (ref 20–33)
CREAT SERPL-MCNC: 1.3 MG/DL (ref 0.5–1.4)
FASTING STATUS PATIENT QL REPORTED: NORMAL
GLOBULIN SER CALC-MCNC: 3.8 G/DL (ref 1.9–3.5)
GLUCOSE SERPL-MCNC: 109 MG/DL (ref 65–99)
HDLC SERPL-MCNC: 43 MG/DL
LDLC SERPL CALC-MCNC: 84 MG/DL
POTASSIUM SERPL-SCNC: 4.5 MMOL/L (ref 3.6–5.5)
PROT SERPL-MCNC: 8.1 G/DL (ref 6–8.2)
SODIUM SERPL-SCNC: 128 MMOL/L (ref 135–145)
TRIGL SERPL-MCNC: 92 MG/DL (ref 0–149)

## 2021-01-22 PROCEDURE — 80053 COMPREHEN METABOLIC PANEL: CPT

## 2021-01-22 PROCEDURE — 36415 COLL VENOUS BLD VENIPUNCTURE: CPT

## 2021-01-22 PROCEDURE — 80061 LIPID PANEL: CPT

## 2021-01-29 ENCOUNTER — TELEPHONE (OUTPATIENT)
Dept: CARDIOLOGY | Facility: MEDICAL CENTER | Age: 71
End: 2021-01-29

## 2021-01-29 ENCOUNTER — PATIENT MESSAGE (OUTPATIENT)
Dept: CARDIOLOGY | Facility: MEDICAL CENTER | Age: 71
End: 2021-01-29

## 2021-01-29 DIAGNOSIS — E87.1 HYPONATREMIA: ICD-10-CM

## 2021-01-29 DIAGNOSIS — I10 ESSENTIAL HYPERTENSION, BENIGN: ICD-10-CM

## 2021-01-29 RX ORDER — LOSARTAN POTASSIUM 100 MG/1
100 TABLET ORAL DAILY
Qty: 90 TAB | Refills: 3 | Status: SHIPPED | OUTPATIENT
Start: 2021-01-29 | End: 2021-10-26 | Stop reason: SDUPTHER

## 2021-01-29 NOTE — TELEPHONE ENCOUNTER
Called pt, unable to reach.  Left voicemail to return this call at his earliest convenience.    [employer] message sent to pt regarding MD recommendations.

## 2021-01-29 NOTE — TELEPHONE ENCOUNTER
----- Message from Jailene Vazquez R.N. sent at 1/28/2021 12:53 PM PST -----    ----- Message -----  From: Ct Hess M.D.  Sent: 1/26/2021   5:29 PM PST  To: Jailene Vazquez R.N.    Sodium is low  Other labs OK  To change from losartan HCT to losartan 100 mg/d  Any diarrhea, vomiting?  Restrict fluid (water intake to 50 ozs a day for a week or so)  Recheck BMP in 2 weeks  ----- Message -----  From: Jailene Vazquez R.N.  Sent: 1/26/2021  11:12 AM PST  To: Ct Hess M.D.    Please advise re: sodium

## 2021-01-29 NOTE — TELEPHONE ENCOUNTER
Received phone call from chuck Charles regarding pt returning call.  S/w pt regarding MD recommendations.  Pt denies diarrhea/vomiting.  He verbalizes understanding and states no other concerns or questions at this time.

## 2021-02-12 ENCOUNTER — HOSPITAL ENCOUNTER (OUTPATIENT)
Dept: LAB | Facility: MEDICAL CENTER | Age: 71
End: 2021-02-12
Attending: INTERNAL MEDICINE
Payer: MEDICARE

## 2021-02-12 DIAGNOSIS — I10 ESSENTIAL HYPERTENSION, BENIGN: ICD-10-CM

## 2021-02-12 DIAGNOSIS — E87.1 HYPONATREMIA: ICD-10-CM

## 2021-02-12 DIAGNOSIS — E78.2 MIXED HYPERLIPIDEMIA: ICD-10-CM

## 2021-02-12 LAB
ALBUMIN SERPL BCP-MCNC: 4.2 G/DL (ref 3.2–4.9)
ALBUMIN/GLOB SERPL: 1.2 G/DL
ALP SERPL-CCNC: 47 U/L (ref 30–99)
ALT SERPL-CCNC: 16 U/L (ref 2–50)
ANION GAP SERPL CALC-SCNC: 10 MMOL/L (ref 7–16)
AST SERPL-CCNC: 25 U/L (ref 12–45)
BILIRUB SERPL-MCNC: 0.4 MG/DL (ref 0.1–1.5)
BUN SERPL-MCNC: 23 MG/DL (ref 8–22)
CALCIUM SERPL-MCNC: 9.1 MG/DL (ref 8.5–10.5)
CHLORIDE SERPL-SCNC: 105 MMOL/L (ref 96–112)
CO2 SERPL-SCNC: 24 MMOL/L (ref 20–33)
CREAT SERPL-MCNC: 1.09 MG/DL (ref 0.5–1.4)
GLOBULIN SER CALC-MCNC: 3.5 G/DL (ref 1.9–3.5)
GLUCOSE SERPL-MCNC: 108 MG/DL (ref 65–99)
POTASSIUM SERPL-SCNC: 4.2 MMOL/L (ref 3.6–5.5)
PROT SERPL-MCNC: 7.7 G/DL (ref 6–8.2)
SODIUM SERPL-SCNC: 139 MMOL/L (ref 135–145)

## 2021-02-12 PROCEDURE — 80053 COMPREHEN METABOLIC PANEL: CPT

## 2021-02-12 PROCEDURE — 36415 COLL VENOUS BLD VENIPUNCTURE: CPT

## 2021-02-16 ENCOUNTER — IMMUNIZATION (OUTPATIENT)
Dept: FAMILY PLANNING/WOMEN'S HEALTH CLINIC | Facility: IMMUNIZATION CENTER | Age: 71
End: 2021-02-16
Attending: INTERNAL MEDICINE
Payer: MEDICARE

## 2021-02-16 DIAGNOSIS — Z23 NEED FOR VACCINATION: ICD-10-CM

## 2021-02-16 DIAGNOSIS — Z23 ENCOUNTER FOR VACCINATION: Primary | ICD-10-CM

## 2021-02-16 PROCEDURE — 0001A PFIZER SARS-COV-2 VACCINE: CPT

## 2021-02-16 PROCEDURE — 91300 PFIZER SARS-COV-2 VACCINE: CPT

## 2021-03-10 ENCOUNTER — IMMUNIZATION (OUTPATIENT)
Dept: FAMILY PLANNING/WOMEN'S HEALTH CLINIC | Facility: IMMUNIZATION CENTER | Age: 71
End: 2021-03-10
Attending: INTERNAL MEDICINE
Payer: MEDICARE

## 2021-03-10 DIAGNOSIS — Z23 ENCOUNTER FOR VACCINATION: Primary | ICD-10-CM

## 2021-03-10 PROCEDURE — 0002A PFIZER SARS-COV-2 VACCINE: CPT | Performed by: INTERNAL MEDICINE

## 2021-03-10 PROCEDURE — 91300 PFIZER SARS-COV-2 VACCINE: CPT | Performed by: INTERNAL MEDICINE

## 2021-03-17 ENCOUNTER — TELEPHONE (OUTPATIENT)
Dept: CARDIOLOGY | Facility: MEDICAL CENTER | Age: 71
End: 2021-03-17

## 2021-03-17 NOTE — TELEPHONE ENCOUNTER
CR    Pt called stating the pharmacy won't fill his prescription of losartan. Please call Pt back at [phone].    Thank you

## 2021-03-18 NOTE — TELEPHONE ENCOUNTER
Upon chart review:      Called [facility], [phone], and s/w Doug pharmacist regarding findings.  He states medication was rejected 90 day picked up on 1/30 so it's just too soon to .     Called pt to review findings and he states he received a call from the pharmacy stating it was refused.  Pt states he is taking medication as prescribed.  Instructed pt to disregard notification as current prescription is still active.  He verbalizes understanding and states no other concerns or questions at this time.  Pt is appreciative of information given.

## 2021-04-07 ENCOUNTER — TELEPHONE (OUTPATIENT)
Dept: CARDIOLOGY | Facility: MEDICAL CENTER | Age: 71
End: 2021-04-07

## 2021-04-08 ENCOUNTER — OFFICE VISIT (OUTPATIENT)
Dept: CARDIOLOGY | Facility: MEDICAL CENTER | Age: 71
End: 2021-04-08
Payer: MEDICARE

## 2021-04-08 VITALS
DIASTOLIC BLOOD PRESSURE: 82 MMHG | HEART RATE: 67 BPM | SYSTOLIC BLOOD PRESSURE: 128 MMHG | BODY MASS INDEX: 31.97 KG/M2 | WEIGHT: 236 LBS | HEIGHT: 72 IN | OXYGEN SATURATION: 96 %

## 2021-04-08 DIAGNOSIS — E78.00 PURE HYPERCHOLESTEROLEMIA: ICD-10-CM

## 2021-04-08 DIAGNOSIS — I48.19 PERSISTENT ATRIAL FIBRILLATION (HCC): ICD-10-CM

## 2021-04-08 DIAGNOSIS — I10 ESSENTIAL HYPERTENSION, BENIGN: ICD-10-CM

## 2021-04-08 DIAGNOSIS — I34.0 NON-RHEUMATIC MITRAL REGURGITATION: ICD-10-CM

## 2021-04-08 DIAGNOSIS — Z79.01 CHRONIC ANTICOAGULATION: ICD-10-CM

## 2021-04-08 PROCEDURE — 99214 OFFICE O/P EST MOD 30 MIN: CPT | Performed by: INTERNAL MEDICINE

## 2021-04-08 ASSESSMENT — FIBROSIS 4 INDEX: FIB4 SCORE: 1.96

## 2021-04-08 ASSESSMENT — ENCOUNTER SYMPTOMS
BLOOD IN STOOL: 0
DIZZINESS: 0
PND: 0
PALPITATIONS: 0
MYALGIAS: 0
SHORTNESS OF BREATH: 0

## 2021-04-08 NOTE — TELEPHONE ENCOUNTER
----- Message from Ct Hess M.D. sent at 2/18/2021 12:42 PM PST -----  Labs much better  Na now NL  If BP <135 cont Rx  ----- Message -----  From: Mylene Mcguire R.N.  Sent: 2/16/2021   8:58 AM PST  To: Ct Hess M.D.    FV scheduled with you 7/28/2021, thank you!

## 2021-04-08 NOTE — PROGRESS NOTES
Chief Complaint   Patient presents with   • Atrial Fibrillation     follow up       Subjective:   Kip Harper is a 71 y.o. male who presents today for f/u above issues.      The patient is doing well from cardiac standpoint.  Denies chest pain, palpitations, syncope or HF symptoms.  Denies side effects from cardiac medications.      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      Past Surgical History:   Procedure Laterality Date   • GASTROSCOPY-ENDO  8/11/2016    Procedure: GASTROSCOPY-ENDO;  Surgeon: Jones Moyer M.D.;  Location: ENDOSCOPY [facility];  Service:      Family History   Problem Relation Age of Onset   • Heart Disease Mother    • Diabetes Mother    • Heart Disease Father    • Lung Disease Father      Social History     Socioeconomic History   • Marital status:      Spouse name: Not on file   • Number of children: Not on file   • Years of education: Not on file   • Highest education level: Not on file   Occupational History   • Not on file   Tobacco Use   • Smoking status: Never Smoker   • Smokeless tobacco: Never Used   Substance and Sexual Activity   • Alcohol use: No   • Drug use: No   • Sexual activity: Not on file   Other Topics Concern   • Not on file   Social History Narrative   • Not on file     Social Determinants of Health     Financial Resource Strain:    • Difficulty of Paying Living Expenses:    Food Insecurity:    • Worried About Running Out of Food in the Last Year:    • Ran Out of Food in the Last Year:    Transportation Needs:    • Lack of Transportation (Medical):    • Lack of Transportation (Non-Medical):    Physical Activity:    • Days of Exercise per Week:    • Minutes of Exercise per Session:    Stress:    • Feeling of Stress :    Social Connections:    • Frequency of  "Communication with Friends and Family:    • Frequency of Social Gatherings with Friends and Family:    • Attends Jainism Services:    • Active Member of Clubs or Organizations:    • Attends Club or Organization Meetings:    • Marital Status:    Intimate Partner Violence:    • Fear of Current or Ex-Partner:    • Emotionally Abused:    • Physically Abused:    • Sexually Abused:      Allergies   Allergen Reactions   • No Known Drug Allergy      Outpatient Encounter Medications as of 4/8/2021   Medication Sig Dispense Refill   • losartan (COZAAR) 100 MG Tab Take 1 Tab by mouth every day. 90 Tab 3   • allopurinol (ZYLOPRIM) 100 MG Tab Take 1 Tab by mouth every day. 90 Tab 3   • apixaban (ELIQUIS) 5mg Tab Take 1 Tab by mouth 2 Times a Day. 180 Tab 3   • verapamil ER (CALAN-SR) 240 MG Tab CR Take 1 Tab by mouth every day. Hold for SBP less than 100, or HR less than 55.  **MUST BE SEEN FOR FURTHER REFILLS, THANK YOU.** 90 Tab 3   • pravastatin (PRAVACHOL) 40 MG tablet Take 1 Tab by mouth every day. **MUST BE SEEN FOR FURTHER REFILLS, THANK YOU.** 90 Tab 3   • sildenafil citrate (VIAGRA) 25 MG Tab Take 1 Tab by mouth as needed for Erectile Dysfunction. (Patient not taking: Reported on 1/20/2021) 5 Tab 3     No facility-administered encounter medications on file as of 4/8/2021.     Review of Systems   Constitutional: Negative for malaise/fatigue.        Able to lose close to 15 lbs   HENT: Negative for congestion and nosebleeds.    Respiratory: Negative for shortness of breath.    Cardiovascular: Negative for chest pain, palpitations, leg swelling and PND.   Gastrointestinal: Negative for blood in stool.   Genitourinary: Negative for hematuria.   Musculoskeletal: Negative for myalgias.   Neurological: Negative for dizziness.        Objective:   /86 (BP Location: Left arm, Patient Position: Sitting)   Pulse 68   Ht 1.816 m (5' 11.5\")   Wt 107 kg (236 lb)   SpO2 96%   BMI 32.46 kg/m²     Physical Exam "   Constitutional: He is oriented to person, place, and time. He appears well-developed and well-nourished.   Neck: No JVD present.   Cardiovascular: Normal rate. An irregularly irregular rhythm present. Exam reveals distant heart sounds. Exam reveals no gallop.   No murmur heard.  Pulmonary/Chest: Effort normal and breath sounds normal. No respiratory distress. He has no wheezes. He has no rales.   Abdominal: Soft. He exhibits no distension. There is no abdominal tenderness.   Musculoskeletal:         General: No edema.   Neurological: He is alert and oriented to person, place, and time.   Skin: Skin is warm and dry. No erythema.   Psychiatric: He has a normal mood and affect. His behavior is normal.     Lab CMP February 12 normal except BUN 23 glucose 108.    Lipid January 22nd LDL 84 HDL 43    Assessment:     1. Persistent atrial fibrillation (HCC)     2. Chronic anticoagulation     3. Essential hypertension, benign     4. Pure hypercholesterolemia     5. Non-rheumatic mitral regurgitation         Medical Decision Making:  Today's Assessment / Status / Plan:     The patient's above cardiovascular conditions are stable.   Will continue current medications.   Will check lipid and CMP in a few months.   Will have the patient return for a followup in 6 months.   Will be happy to see the patient sooner as needed.   Thank you for allowing me to participate in the caring of this patient.

## 2021-04-16 ENCOUNTER — OFFICE VISIT (OUTPATIENT)
Dept: URGENT CARE | Facility: PHYSICIAN GROUP | Age: 71
End: 2021-04-16
Payer: MEDICARE

## 2021-04-16 ENCOUNTER — HOSPITAL ENCOUNTER (OUTPATIENT)
Dept: LAB | Facility: MEDICAL CENTER | Age: 71
End: 2021-04-16
Attending: PHYSICIAN ASSISTANT
Payer: MEDICARE

## 2021-04-16 VITALS
HEART RATE: 87 BPM | OXYGEN SATURATION: 100 % | SYSTOLIC BLOOD PRESSURE: 138 MMHG | RESPIRATION RATE: 16 BRPM | WEIGHT: 236 LBS | DIASTOLIC BLOOD PRESSURE: 60 MMHG | BODY MASS INDEX: 33.04 KG/M2 | HEIGHT: 71 IN | TEMPERATURE: 98.6 F

## 2021-04-16 DIAGNOSIS — R31.9 HEMATURIA, UNSPECIFIED TYPE: ICD-10-CM

## 2021-04-16 LAB
ALBUMIN SERPL BCP-MCNC: 4.4 G/DL (ref 3.2–4.9)
ALBUMIN/GLOB SERPL: 1.3 G/DL
ALP SERPL-CCNC: 45 U/L (ref 30–99)
ALT SERPL-CCNC: 15 U/L (ref 2–50)
ANION GAP SERPL CALC-SCNC: 11 MMOL/L (ref 7–16)
APPEARANCE UR: NORMAL
AST SERPL-CCNC: 25 U/L (ref 12–45)
BASOPHILS # BLD AUTO: 0.7 % (ref 0–1.8)
BASOPHILS # BLD: 0.04 K/UL (ref 0–0.12)
BILIRUB SERPL-MCNC: 0.9 MG/DL (ref 0.1–1.5)
BILIRUB UR STRIP-MCNC: NORMAL MG/DL
BUN SERPL-MCNC: 18 MG/DL (ref 8–22)
CALCIUM SERPL-MCNC: 9.4 MG/DL (ref 8.5–10.5)
CHLORIDE SERPL-SCNC: 104 MMOL/L (ref 96–112)
CO2 SERPL-SCNC: 21 MMOL/L (ref 20–33)
COLOR UR AUTO: NORMAL
CREAT SERPL-MCNC: 0.92 MG/DL (ref 0.5–1.4)
EOSINOPHIL # BLD AUTO: 0.05 K/UL (ref 0–0.51)
EOSINOPHIL NFR BLD: 0.8 % (ref 0–6.9)
ERYTHROCYTE [DISTWIDTH] IN BLOOD BY AUTOMATED COUNT: 49.2 FL (ref 35.9–50)
GLOBULIN SER CALC-MCNC: 3.5 G/DL (ref 1.9–3.5)
GLUCOSE SERPL-MCNC: 92 MG/DL (ref 65–99)
GLUCOSE UR STRIP.AUTO-MCNC: NEGATIVE MG/DL
HCT VFR BLD AUTO: 46.1 % (ref 42–52)
HGB BLD-MCNC: 15.2 G/DL (ref 14–18)
IMM GRANULOCYTES # BLD AUTO: 0.02 K/UL (ref 0–0.11)
IMM GRANULOCYTES NFR BLD AUTO: 0.3 % (ref 0–0.9)
KETONES UR STRIP.AUTO-MCNC: NEGATIVE MG/DL
LEUKOCYTE ESTERASE UR QL STRIP.AUTO: NEGATIVE
LYMPHOCYTES # BLD AUTO: 1.4 K/UL (ref 1–4.8)
LYMPHOCYTES NFR BLD: 23.6 % (ref 22–41)
MCH RBC QN AUTO: 32.1 PG (ref 27–33)
MCHC RBC AUTO-ENTMCNC: 33 G/DL (ref 33.7–35.3)
MCV RBC AUTO: 97.3 FL (ref 81.4–97.8)
MONOCYTES # BLD AUTO: 0.51 K/UL (ref 0–0.85)
MONOCYTES NFR BLD AUTO: 8.6 % (ref 0–13.4)
NEUTROPHILS # BLD AUTO: 3.92 K/UL (ref 1.82–7.42)
NEUTROPHILS NFR BLD: 66 % (ref 44–72)
NITRITE UR QL STRIP.AUTO: NEGATIVE
NRBC # BLD AUTO: 0 K/UL
NRBC BLD-RTO: 0 /100 WBC
PH UR STRIP.AUTO: 6 [PH] (ref 5–8)
PLATELET # BLD AUTO: 198 K/UL (ref 164–446)
PMV BLD AUTO: 11.7 FL (ref 9–12.9)
POTASSIUM SERPL-SCNC: 4.1 MMOL/L (ref 3.6–5.5)
PROT SERPL-MCNC: 7.9 G/DL (ref 6–8.2)
PROT UR QL STRIP: 30 MG/DL
RBC # BLD AUTO: 4.74 M/UL (ref 4.7–6.1)
RBC UR QL AUTO: NORMAL
SODIUM SERPL-SCNC: 136 MMOL/L (ref 135–145)
SP GR UR STRIP.AUTO: 1.02
UROBILINOGEN UR STRIP-MCNC: 1 MG/DL
WBC # BLD AUTO: 5.9 K/UL (ref 4.8–10.8)

## 2021-04-16 PROCEDURE — 80053 COMPREHEN METABOLIC PANEL: CPT

## 2021-04-16 PROCEDURE — 81002 URINALYSIS NONAUTO W/O SCOPE: CPT | Performed by: PHYSICIAN ASSISTANT

## 2021-04-16 PROCEDURE — 85025 COMPLETE CBC W/AUTO DIFF WBC: CPT

## 2021-04-16 PROCEDURE — 99214 OFFICE O/P EST MOD 30 MIN: CPT | Performed by: PHYSICIAN ASSISTANT

## 2021-04-16 PROCEDURE — 36415 COLL VENOUS BLD VENIPUNCTURE: CPT

## 2021-04-16 ASSESSMENT — ENCOUNTER SYMPTOMS
ABDOMINAL PAIN: 0
FLANK PAIN: 0
ANOREXIA: 0

## 2021-04-16 ASSESSMENT — FIBROSIS 4 INDEX: FIB4 SCORE: 1.96

## 2021-04-16 NOTE — PROGRESS NOTES
Subjective:   Kip Harper is a 71 y.o. male who presents today with   Chief Complaint   Patient presents with   • Blood in Urine     last night blood in urine and this morning, no pain        Other  This is a new problem. The current episode started yesterday. The problem occurs constantly. The problem has been unchanged. Associated symptoms include urinary symptoms. Pertinent negatives include no abdominal pain, anorexia, chest pain, nausea or vomiting. Nothing aggravates the symptoms. He has tried nothing for the symptoms. The treatment provided no relief.   New onset of hematuria since last night.  Patient states this has never happened before.  He denies any nausea, vomiting, diarrhea, painful urination, abdominal pain or other symptoms.  Patient states that he drinks plenty of fluids.  Keto diet for 2 months.  He denies any other medication changes.  Denies any alcohol use.  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  MEDS:   Current Outpatient Medications:   •  losartan (COZAAR) 100 MG Tab, Take 1 Tab by mouth every day., Disp: 90 Tab, Rfl: 3  •  apixaban (ELIQUIS) 5mg Tab, Take 1 Tab by mouth 2 Times a Day., Disp: 180 Tab, Rfl: 3  •  verapamil ER (CALAN-SR) 240 MG Tab CR, Take 1 Tab by mouth every day. Hold for SBP less than 100, or HR less than 55.  **MUST BE SEEN FOR FURTHER REFILLS, THANK YOU.**, Disp: 90 Tab, Rfl: 3  •  pravastatin (PRAVACHOL) 40 MG tablet, Take 1 Tab by mouth every day. **MUST BE SEEN FOR FURTHER REFILLS, THANK YOU.**, Disp: 90 Tab, Rfl: 3  •  allopurinol (ZYLOPRIM) 100 MG Tab, Take 1 Tab by mouth every day. (Patient not taking: Reported on 4/16/2021), Disp: 90 Tab, Rfl: 3  ALLERGIES:   Allergies   Allergen Reactions   • No Known Drug Allergy      SURGHX:   Past Surgical History:   Procedure Laterality Date   •  "GASTROSCOPY-ENDO  8/11/2016    Procedure: GASTROSCOPY-ENDO;  Surgeon: Jones Moyer M.D.;  Location: ENDOSCOPY [facility];  Service:      SOCHX:  reports that he has never smoked. He has never used smokeless tobacco. He reports that he does not drink alcohol and does not use drugs.  FH: Reviewed with patient, not pertinent to this visit.       Review of Systems   Cardiovascular: Negative for chest pain.   Gastrointestinal: Negative for abdominal pain, anorexia, blood in stool, constipation, diarrhea, melena, nausea and vomiting.   Genitourinary: Positive for hematuria. Negative for dysuria, flank pain, frequency and urgency.        Objective:   /60   Pulse 87   Temp 37 °C (98.6 °F) (Temporal)   Resp 16   Ht 1.803 m (5' 11\")   Wt 107 kg (236 lb)   SpO2 100%   BMI 32.92 kg/m²   Physical Exam  Vitals and nursing note reviewed.   Constitutional:       General: He is not in acute distress.     Appearance: Normal appearance. He is well-developed. He is not ill-appearing or toxic-appearing.   HENT:      Head: Normocephalic and atraumatic.      Right Ear: Hearing normal.      Left Ear: Hearing normal.   Eyes:      Conjunctiva/sclera: Conjunctivae normal.   Cardiovascular:      Rate and Rhythm: Normal rate and regular rhythm.      Heart sounds: Murmur present.   Pulmonary:      Effort: Pulmonary effort is normal.   Abdominal:      General: Bowel sounds are normal. There is no distension or abdominal bruit.      Palpations: There is no shifting dullness, fluid wave, hepatomegaly, splenomegaly, mass or pulsatile mass.      Tenderness: There is no abdominal tenderness. There is no right CVA tenderness, left CVA tenderness, guarding or rebound.      Hernia: No hernia is present.   Genitourinary:     Comments: Patient deferred  exam  Musculoskeletal:      Comments: Normal movement in all 4 extremities   Skin:     General: Skin is warm and dry.   Neurological:      Mental Status: He is alert.      " Coordination: Coordination normal.   Psychiatric:         Mood and Affect: Mood normal.         Results for orders placed or performed in visit on 04/16/21   POCT Urinalysis   Result Value Ref Range    POC Color Fay Negative    POC Appearance Slighty Cloudy Negative    POC Leukocyte Esterase Negative Negative    POC Nitrites Negative Negative    POC Urobiligen 1.0 Negative (0.2) mg/dL    POC Protein 30 Negative mg/dL    POC Urine PH 6.0 5.0 - 8.0    POC Blood Large Negative    POC Specific Gravity 1.025 <1.005 - >1.030    POC Ketones Negative Negative mg/dL    POC Bilirubin Small Negative mg/dL    POC Glucose Negative Negative mg/dL       Assessment/Plan:   Assessment    1. Hematuria, unspecified type  - POCT Urinalysis  - CBC WITH DIFFERENTIAL; Future  - ESTIMATED GFR; Future  - AMB REFERRAL BACK TO [facility] PCP  - Comp Metabolic Panel; Future  Will obtain lab work at this time to rule out any acute kidney dysfunction.  Discussed differential which includes bladder versus kidney etiology.  No signs of infection on exam today.  Discussed low threshold for going into the ER.  Encouraged continued increase in fluids.  Lab work with no acute abnormalities. Recommend follow up with PCP or Urology, or sooner with any new or worsening symptoms.   Greater than 30 minutes were spent reviewing patient's chart, examining and obtaining history from patient, and discussing plan of care.     Please note that this dictation was created using voice recognition software. I have made every reasonable attempt to correct obvious errors, but I expect that there are errors of grammar and possibly content that I did not discover before finalizing the note.    Brien Barfield PA-C

## 2021-04-18 ASSESSMENT — ENCOUNTER SYMPTOMS
NAUSEA: 0
BLOOD IN STOOL: 0
DIARRHEA: 0
CONSTIPATION: 0
VOMITING: 0

## 2021-04-19 ENCOUNTER — TELEPHONE (OUTPATIENT)
Dept: URGENT CARE | Facility: CLINIC | Age: 71
End: 2021-04-19

## 2021-04-19 NOTE — TELEPHONE ENCOUNTER
Called and discussed results with patient. He is understanding and appreciative of my call.States blood in urine has cleared up. Will follow up with PCP later this week.

## 2021-04-22 ENCOUNTER — HOSPITAL ENCOUNTER (OUTPATIENT)
Dept: LAB | Facility: MEDICAL CENTER | Age: 71
End: 2021-04-22
Attending: FAMILY MEDICINE
Payer: MEDICARE

## 2021-04-22 LAB
APPEARANCE UR: CLEAR
BACTERIA #/AREA URNS HPF: ABNORMAL /HPF
BILIRUB UR QL STRIP.AUTO: NEGATIVE
COLOR UR: YELLOW
EPI CELLS #/AREA URNS HPF: ABNORMAL /HPF
GLUCOSE UR STRIP.AUTO-MCNC: NEGATIVE MG/DL
HCV AB SER QL: NORMAL
HYALINE CASTS #/AREA URNS LPF: ABNORMAL /LPF
KETONES UR STRIP.AUTO-MCNC: NEGATIVE MG/DL
LEUKOCYTE ESTERASE UR QL STRIP.AUTO: NEGATIVE
MICRO URNS: ABNORMAL
MUCOUS THREADS #/AREA URNS HPF: ABNORMAL /HPF
NITRITE UR QL STRIP.AUTO: NEGATIVE
PH UR STRIP.AUTO: 6 [PH] (ref 5–8)
PROT UR QL STRIP: NEGATIVE MG/DL
PSA SERPL-MCNC: 0.99 NG/ML (ref 0–4)
RBC # URNS HPF: ABNORMAL /HPF
RBC UR QL AUTO: ABNORMAL
SP GR UR STRIP.AUTO: 1.02
UROBILINOGEN UR STRIP.AUTO-MCNC: 0.2 MG/DL
WBC #/AREA URNS HPF: ABNORMAL /HPF

## 2021-04-22 PROCEDURE — 81001 URINALYSIS AUTO W/SCOPE: CPT

## 2021-04-22 PROCEDURE — 84153 ASSAY OF PSA TOTAL: CPT

## 2021-04-22 PROCEDURE — 87086 URINE CULTURE/COLONY COUNT: CPT

## 2021-04-22 PROCEDURE — 86803 HEPATITIS C AB TEST: CPT

## 2021-04-22 PROCEDURE — 36415 COLL VENOUS BLD VENIPUNCTURE: CPT

## 2021-04-22 PROCEDURE — 83036 HEMOGLOBIN GLYCOSYLATED A1C: CPT

## 2021-04-23 ENCOUNTER — OFFICE VISIT (OUTPATIENT)
Dept: CARDIOLOGY | Facility: MEDICAL CENTER | Age: 71
End: 2021-04-23
Payer: MEDICARE

## 2021-04-23 VITALS
HEART RATE: 86 BPM | DIASTOLIC BLOOD PRESSURE: 84 MMHG | BODY MASS INDEX: 32.62 KG/M2 | SYSTOLIC BLOOD PRESSURE: 154 MMHG | WEIGHT: 233 LBS | HEIGHT: 71 IN | OXYGEN SATURATION: 95 %

## 2021-04-23 DIAGNOSIS — I10 ESSENTIAL HYPERTENSION, BENIGN: ICD-10-CM

## 2021-04-23 DIAGNOSIS — R31.0 GROSS HEMATURIA: ICD-10-CM

## 2021-04-23 DIAGNOSIS — I34.0 NON-RHEUMATIC MITRAL REGURGITATION: ICD-10-CM

## 2021-04-23 DIAGNOSIS — Z79.01 CHRONIC ANTICOAGULATION: ICD-10-CM

## 2021-04-23 DIAGNOSIS — I48.19 PERSISTENT ATRIAL FIBRILLATION (HCC): ICD-10-CM

## 2021-04-23 DIAGNOSIS — E78.00 PURE HYPERCHOLESTEROLEMIA: ICD-10-CM

## 2021-04-23 LAB
EST. AVERAGE GLUCOSE BLD GHB EST-MCNC: 126 MG/DL
HBA1C MFR BLD: 6 % (ref 4–5.6)

## 2021-04-23 PROCEDURE — 99214 OFFICE O/P EST MOD 30 MIN: CPT | Performed by: INTERNAL MEDICINE

## 2021-04-23 ASSESSMENT — ENCOUNTER SYMPTOMS
BLOOD IN STOOL: 0
FOCAL WEAKNESS: 0
SPEECH CHANGE: 0
DOUBLE VISION: 0
HEMOPTYSIS: 0
PALPITATIONS: 0
BRUISES/BLEEDS EASILY: 0
DIZZINESS: 0

## 2021-04-23 ASSESSMENT — FIBROSIS 4 INDEX: FIB4 SCORE: 2.35

## 2021-04-23 NOTE — PROGRESS NOTES
Chief Complaint   Patient presents with   • Atrial Fibrillation     follow up   Hematuria  Anticoagulation    Subjective:   Kip Harper is a 71 y.o. male who presents today to discuss about anticoagulation    He stated he noted hematuria for 4 days ago. There was no luz elena clot  He denies dysuria, flank pain fever or chills.   He has no previous history of kidney stone.  He was seen at urgent care.  Urinalysis showed microscopic hematuria.  He temporary stop his Eliquis 24 hours and the hematuria resolved.  No other bleeding issue except about 4 years ago when he had GI bleed but he was taking indomethacin for his gout at the time.    He has hypertension but no diabetes mellitus.  No prior stroke.    Echocardiography in December 2017 performed at [facility] reportedly showed mild mitral regurgitation with moderately dilated left atrium.  Normal left ventricular size, wall motion and wall systolic function.  Ejection fraction was 65%.      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;  Service:      Family History   Problem Relation Age of Onset   • Heart Disease Mother    • Diabetes Mother    • Heart Disease Father    • Lung Disease Father      Social History     Socioeconomic History   • Marital status:      Spouse name: Not on file   • Number of children: Not on file   • Years of education: Not on file   • Highest education level: Not on file   Occupational History   • Not on file   Tobacco Use   • Smoking status: Never Smoker   • Smokeless tobacco: Never Used   Substance and Sexual Activity   • Alcohol use: No   • Drug  use: No   • Sexual activity: Not on file   Other Topics Concern   • Not on file   Social History Narrative   • Not on file     Social Determinants of Health     Financial Resource Strain:    • Difficulty of Paying Living Expenses:    Food Insecurity:    • Worried About Running Out of Food in the Last Year:    • Ran Out of Food in the Last Year:    Transportation Needs:    • Lack of Transportation (Medical):    • Lack of Transportation (Non-Medical):    Physical Activity:    • Days of Exercise per Week:    • Minutes of Exercise per Session:    Stress:    • Feeling of Stress :    Social Connections:    • Frequency of Communication with Friends and Family:    • Frequency of Social Gatherings with Friends and Family:    • Attends Restorationist Services:    • Active Member of Clubs or Organizations:    • Attends Club or Organization Meetings:    • Marital Status:    Intimate Partner Violence:    • Fear of Current or Ex-Partner:    • Emotionally Abused:    • Physically Abused:    • Sexually Abused:      Allergies   Allergen Reactions   • No Known Drug Allergy      Outpatient Encounter Medications as of 4/23/2021   Medication Sig Dispense Refill   • losartan (COZAAR) 100 MG Tab Take 1 Tab by mouth every day. 90 Tab 3   • allopurinol (ZYLOPRIM) 100 MG Tab Take 1 Tab by mouth every day. 90 Tab 3   • apixaban (ELIQUIS) 5mg Tab Take 1 Tab by mouth 2 Times a Day. 180 Tab 3   • verapamil ER (CALAN-SR) 240 MG Tab CR Take 1 Tab by mouth every day. Hold for SBP less than 100, or HR less than 55.  **MUST BE SEEN FOR FURTHER REFILLS, THANK YOU.** 90 Tab 3   • pravastatin (PRAVACHOL) 40 MG tablet Take 1 Tab by mouth every day. **MUST BE SEEN FOR FURTHER REFILLS, THANK YOU.** 90 Tab 3     No facility-administered encounter medications on file as of 4/23/2021.     Review of Systems   Constitutional: Negative for malaise/fatigue.   HENT: Negative for nosebleeds.    Eyes: Negative for double vision.   Respiratory: Negative for hemoptysis.   "  Cardiovascular: Negative for chest pain and palpitations.   Gastrointestinal: Negative for blood in stool.   Genitourinary: Positive for hematuria.   Neurological: Negative for dizziness, speech change and focal weakness.   Endo/Heme/Allergies: Does not bruise/bleed easily.        Objective:   /84 (BP Location: Left arm, Patient Position: Sitting)   Pulse 86   Ht 1.803 m (5' 11\")   Wt 106 kg (233 lb)   SpO2 95%   BMI 32.50 kg/m²     Physical Exam   Constitutional: He is oriented to person, place, and time. He appears well-developed and well-nourished.   Neck: No JVD present.   Cardiovascular: Normal rate. An irregularly irregular rhythm present. Exam reveals distant heart sounds. Exam reveals no gallop.   No murmur heard.  Pulmonary/Chest: Effort normal and breath sounds normal. No respiratory distress. He has no wheezes. He has no rales.   Abdominal: Soft. He exhibits no distension. There is no abdominal tenderness.   Musculoskeletal:         General: No edema.   Neurological: He is alert and oriented to person, place, and time.   Skin: Skin is warm and dry. No erythema.   Psychiatric: He has a normal mood and affect. His behavior is normal.     CMP April 16 normal.    Assessment:     1. Gross hematuria     2. Chronic anticoagulation     3. Persistent atrial fibrillation (HCC)     4. Essential hypertension, benign     5. Non-rheumatic mitral regurgitation     6. Pure hypercholesterolemia         Medical Decision Making:  Today's Assessment / Status / Plan:     We discussed at length the risk and benefit of anticoagulation.    His [id number]- vasc score is 2.  I feel that he should continue anticoagulation.  We did discuss alternative option naming watchman device.  He prefers to stay on anticoagulation for now.    I think is it very acceptable given the fact that so for he only had one episode of relatively mild hematuria.    Will see him back in 4 to 6 months for follow-up. Will be happy to see him " sooner as needed.  We will keep you posted about our findings and further recommendations as they become available. Please also do not hesitate to call for any questions.  Thank you kindly for allowing me to participate in the care of this patient.

## 2021-04-24 LAB
BACTERIA UR CULT: NORMAL
SIGNIFICANT IND 70042: NORMAL
SITE SITE: NORMAL
SOURCE SOURCE: NORMAL

## 2021-06-23 DIAGNOSIS — E78.00 PURE HYPERCHOLESTEROLEMIA: ICD-10-CM

## 2021-06-29 RX ORDER — PRAVASTATIN SODIUM 40 MG
40 TABLET ORAL DAILY
Qty: 100 TABLET | Refills: 3 | Status: SHIPPED | OUTPATIENT
Start: 2021-06-29 | End: 2021-10-26 | Stop reason: SDUPTHER

## 2021-07-21 NOTE — MR AVS SNAPSHOT
Kip Harper   2017 1:15 PM   Office Visit   MRN: [id number]    Department:  [facility]   Dept Phone:  [phone]    Description:  Male : 1950   Provider:  Cathey J Hamman, A.P.N.           Reason for Visit     Foot Problem seen  for same issue x-ray was down never got [facility]      Allergies as of 2017     Allergen Noted Reactions    No Known Drug Allergy 2012         You were diagnosed with     Right foot pain   [[id number]]       Acute right ankle pain   [[id number]]         Vital Signs     Blood Pressure Pulse Temperature Respirations Weight Oxygen Saturation    140/102 mmHg 69 36.9 °C (98.5 °F) 16 103.42 kg (228 lb) 94%    Smoking Status                   Never Smoker            Basic Information     Date Of Birth Sex Race Ethnicity Preferred Language    1950 Male  or   Origin (Tongan,Hungarian,Tongan,Rufus, etc) English      Problem List              ICD-10-CM Priority Class Noted - Resolved    Hyperlipidemia E78.5 High  Unknown - Present    Hypertensive heart disease  High  Unknown - Present    Obesity E66.9   Unknown - Present    Paroxysmal atrial fibrillation (HCC) I48.0 High  Unknown - Present    Sleep apnea G47.30   Unknown - Present    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 R76.11   Unknown - Present    GOUT  High  3/15/2012 - Present    Vision loss of right eye H54.61   2014 - Present    Atrial fibrillation (HCC) I48.91   2014 - Present    Essential hypertension, benign I10 Medium  2014 - Present    Mitral regurgitation I34.0 Medium  2014 - Present    Chronic anticoagulation Z79.01   2014 - Present    Gout M10.9   2014 - Present    Melena K92.1   2016 - Present    Syncope R55 High  2016 - Present    Anemia D64.9 High  8/10/2016 - Present      Health Maintenance        Date Due Completion Dates    IMM DTaP/Tdap/Td Vaccine (1 - Tdap) 1968 ---    COLONOSCOPY 1999 ---    IMM ZOSTER VACCINE  4/7/2009 ---    IMM PNEUMOCOCCAL 65+ (ADULT) LOW/MEDIUM RISK SERIES (1 of 2 - PCV13) 4/7/2014 ---    IMM INFLUENZA (1) 9/1/2016 ---            Current Immunizations     No immunizations on file.      Below and/or attached are the medications your provider expects you to take. Review all of your home medications and newly ordered medications with your provider and/or pharmacist. Follow medication instructions as directed by your provider and/or pharmacist. Please keep your medication list with you and share with your provider. Update the information when medications are discontinued, doses are changed, or new medications (including over-the-counter products) are added; and carry medication information at all times in the event of emergency situations     Allergies:  NO KNOWN DRUG ALLERGY - (reactions not documented)               Medications  Valid as of: January 11, 2017 -  3:39 PM    Generic Name Brand Name Tablet Size Instructions for use    Apixaban (Tab) ELIQUIS 5mg Take 1 Tab by mouth 2 Times a Day.        Colchicine (Tab) COLCRYS 0.6 MG TAKE 2 TABS AT SIGNS OF GOUT ATTACK, THEN 1 TAB ONE HOUR AFTER FIRST DOSE. DO NOT REPEAT EARLIER THAN 3 DAYS.        Hydrocodone-Acetaminophen (Tab) NORCO 5-325 MG 1 TAB EVERY 6 HOURS ONLY IF NEEDED FOR PAIN. MAY CAUSE DROWSINESS.        MethylPREDNISolone (Tablet Therapy Pack) MEDROL DOSEPAK 4 MG TAKE AS DIRECTED ON PACKAGE.        MethylPREDNISolone (Tablet Therapy Pack) MEDROL DOSEPAK 4 MG Take 1 Tab by mouth every day.        Metoprolol Tartrate (Tab) LOPRESSOR 25 MG Take 1 Tab by mouth 2 Times a Day.        Pantoprazole Sodium (Tablet Delayed Response) PROTONIX 40 MG Take 1 Tab by mouth 2 times a day.        Potassium Chloride Oksana CR (Tab CR) K-DUR 20 MEQ Take 2 Tabs by mouth every day.        Pravastatin Sodium (Tab) PRAVACHOL 40 MG Take 40 mg by mouth every evening.        Pravastatin Sodium (Tab) PRAVACHOL 40 MG TAKE 1 TABLET BY MOUTH IN THE EVENING        PredniSONE  (Tab) DELTASONE 10 MG Take 1 tablet 3 times daily for 5 days        Verapamil HCl (Tab CR) CALAN- MG Take 180 mg by mouth every evening.        .                 Medicines prescribed today were sent to:     [facility] #[id number] - [place], [place] - [address]    [address] [place] [address]    Phone: [phone] Fax: [phone]    Open 24 Hours?: No      Medication refill instructions:       If your prescription bottle indicates you have medication refills left, it is not necessary to call your provider’s office. Please contact your pharmacy and they will refill your medication.    If your prescription bottle indicates you do not have any refills left, you may request refills at any time through one of the following ways: The online DecaWave system (except Urgent Care), by calling your provider’s office, or by asking your pharmacy to contact your provider’s office with a refill request. Medication refills are processed only during regular business hours and may not be available until the next business day. Your provider may request additional information or to have a follow-up visit with you prior to refilling your medication.   *Please Note: Medication refills are assigned a new Rx number when refilled electronically. Your pharmacy may indicate that no refills were authorized even though a new prescription for the same medication is available at the pharmacy. Please request the medicine by name with the pharmacy before contacting your provider for a refill.        Your To Do List     Future Labs/Procedures Complete By Expires    DX-ANKLE 3+ VIEWS RIGHT  As directed 1/11/2018    DX-FOOT-COMPLETE 3+ RIGHT  As directed 1/11/2018         DecaWave Access Code: [email or web address]  Expires: 2/5/2017 11:28 AM    DecaWave  A secure, online tool to manage your health information     [employer]’s DecaWave® is a secure, online tool that connects you to your personalized health information from the privacy of your  home -- day or night - making it very easy for you to manage your healthcare. Once the activation process is completed, you can even access your medical information using the GOintegro cr, which is available for free in the Apple Cr store or Google Play store.     GOintegro provides the following levels of access (as shown below):   My Chart Features   [facility] Doctor [facility]  Specialists [facility]  Urgent  Care Non-[facility]  Primary Care  Doctor   Email your healthcare team securely and privately 24/7 X X X    Manage appointments: schedule your next appointment; view details of past/upcoming appointments X      Request prescription refills. X      View recent personal medical records, including lab and immunizations X X X X   View health record, including health history, allergies, medications X X X X   Read reports about your outpatient visits, procedures, consult and ER notes X X X X   See your discharge summary, which is a recap of your hospital and/or ER visit that includes your diagnosis, lab results, and care plan. X X       How to register for GOintegro:  1. Go to  [email or web address].  2. Click on the Sign Up Now box, which takes you to the New Member Sign Up page. You will need to provide the following information:  a. Enter your GOintegro Access Code exactly as it appears at the top of this page. (You will not need to use this code after you’ve completed the sign-up process. If you do not sign up before the expiration date, you must request a new code.)   b. Enter your date of birth.   c. Enter your home email address.   d. Click Submit, and follow the next screen’s instructions.  3. Create a GOintegro ID. This will be your GOintegro login ID and cannot be changed, so think of one that is secure and easy to remember.  4. Create a GOintegro password. You can change your password at any time.  5. Enter your Password Reset Question and Answer. This can be used at a later time if you forget your password.    6. Enter your e-mail address. This allows you to receive e-mail notifications when new information is available in [email or web address].  7. Click Sign Up. You can now view your health information.    For assistance activating your [email or web address] account, call [phone]         none father not allowed to visit

## 2021-08-26 ENCOUNTER — PATIENT MESSAGE (OUTPATIENT)
Dept: HEALTH INFORMATION MANAGEMENT | Facility: OTHER | Age: 71
End: 2021-08-26

## 2021-09-28 ENCOUNTER — TELEPHONE (OUTPATIENT)
Dept: CARDIOLOGY | Facility: MEDICAL CENTER | Age: 71
End: 2021-09-28

## 2021-09-28 NOTE — TELEPHONE ENCOUNTER
Called pt in regards to lab work that was ordered at previous OV with CR. Pt stated he forgot about lab work and has not gotten lab work done. Pt requested to reschedule appt for next month. Patient now seeing MR 10/26/21.

## 2021-10-11 ENCOUNTER — TELEPHONE (OUTPATIENT)
Dept: CARDIOLOGY | Facility: MEDICAL CENTER | Age: 71
End: 2021-10-11

## 2021-10-11 DIAGNOSIS — I10 ESSENTIAL HYPERTENSION, BENIGN: ICD-10-CM

## 2021-10-13 RX ORDER — LOSARTAN POTASSIUM 100 MG/1
100 TABLET ORAL DAILY
Qty: 90 TABLET | Refills: 3 | OUTPATIENT
Start: 2021-10-13

## 2021-10-13 NOTE — TELEPHONE ENCOUNTER
Upon chart review Losartan filled 90 days with 3 refills sent 1/29/2021, see below.      Medication refused.

## 2021-10-22 ENCOUNTER — HOSPITAL ENCOUNTER (OUTPATIENT)
Dept: LAB | Facility: MEDICAL CENTER | Age: 71
End: 2021-10-22
Attending: INTERNAL MEDICINE
Payer: MEDICARE

## 2021-10-22 DIAGNOSIS — E78.00 PURE HYPERCHOLESTEROLEMIA: ICD-10-CM

## 2021-10-22 DIAGNOSIS — I10 ESSENTIAL HYPERTENSION, BENIGN: ICD-10-CM

## 2021-10-22 LAB
ALBUMIN SERPL BCP-MCNC: 4.3 G/DL (ref 3.2–4.9)
ALBUMIN/GLOB SERPL: 1.3 G/DL
ALP SERPL-CCNC: 48 U/L (ref 30–99)
ALT SERPL-CCNC: 12 U/L (ref 2–50)
ANION GAP SERPL CALC-SCNC: 10 MMOL/L (ref 7–16)
AST SERPL-CCNC: 20 U/L (ref 12–45)
BILIRUB SERPL-MCNC: 0.8 MG/DL (ref 0.1–1.5)
BUN SERPL-MCNC: 14 MG/DL (ref 8–22)
CALCIUM SERPL-MCNC: 9.1 MG/DL (ref 8.5–10.5)
CHLORIDE SERPL-SCNC: 103 MMOL/L (ref 96–112)
CHOLEST SERPL-MCNC: 155 MG/DL (ref 100–199)
CO2 SERPL-SCNC: 21 MMOL/L (ref 20–33)
CREAT SERPL-MCNC: 0.75 MG/DL (ref 0.5–1.4)
FASTING STATUS PATIENT QL REPORTED: NORMAL
GLOBULIN SER CALC-MCNC: 3.2 G/DL (ref 1.9–3.5)
GLUCOSE SERPL-MCNC: 97 MG/DL (ref 65–99)
HDLC SERPL-MCNC: 55 MG/DL
LDLC SERPL CALC-MCNC: 83 MG/DL
POTASSIUM SERPL-SCNC: 4 MMOL/L (ref 3.6–5.5)
PROT SERPL-MCNC: 7.5 G/DL (ref 6–8.2)
SODIUM SERPL-SCNC: 134 MMOL/L (ref 135–145)
TRIGL SERPL-MCNC: 84 MG/DL (ref 0–149)

## 2021-10-22 PROCEDURE — 80053 COMPREHEN METABOLIC PANEL: CPT

## 2021-10-22 PROCEDURE — 80061 LIPID PANEL: CPT

## 2021-10-22 PROCEDURE — 36415 COLL VENOUS BLD VENIPUNCTURE: CPT

## 2021-10-26 ENCOUNTER — OFFICE VISIT (OUTPATIENT)
Dept: CARDIOLOGY | Facility: MEDICAL CENTER | Age: 71
End: 2021-10-26
Payer: MEDICARE

## 2021-10-26 VITALS
DIASTOLIC BLOOD PRESSURE: 88 MMHG | OXYGEN SATURATION: 96 % | BODY MASS INDEX: 33.04 KG/M2 | RESPIRATION RATE: 16 BRPM | HEIGHT: 71 IN | HEART RATE: 56 BPM | WEIGHT: 236 LBS | SYSTOLIC BLOOD PRESSURE: 136 MMHG

## 2021-10-26 DIAGNOSIS — I48.20 CHRONIC ATRIAL FIBRILLATION (HCC): ICD-10-CM

## 2021-10-26 DIAGNOSIS — Z79.01 CHRONIC ANTICOAGULATION: ICD-10-CM

## 2021-10-26 DIAGNOSIS — I10 ESSENTIAL HYPERTENSION, BENIGN: ICD-10-CM

## 2021-10-26 DIAGNOSIS — E78.00 PURE HYPERCHOLESTEROLEMIA: ICD-10-CM

## 2021-10-26 DIAGNOSIS — I34.0 MITRAL VALVE INSUFFICIENCY, UNSPECIFIED ETIOLOGY: ICD-10-CM

## 2021-10-26 DIAGNOSIS — I34.0 NON-RHEUMATIC MITRAL REGURGITATION: ICD-10-CM

## 2021-10-26 LAB — EKG IMPRESSION: NORMAL

## 2021-10-26 PROCEDURE — 99214 OFFICE O/P EST MOD 30 MIN: CPT | Performed by: NURSE PRACTITIONER

## 2021-10-26 PROCEDURE — 93000 ELECTROCARDIOGRAM COMPLETE: CPT | Performed by: INTERNAL MEDICINE

## 2021-10-26 RX ORDER — VERAPAMIL HYDROCHLORIDE 240 MG/1
240 TABLET, FILM COATED, EXTENDED RELEASE ORAL DAILY
Qty: 100 TABLET | Refills: 3 | Status: SHIPPED | OUTPATIENT
Start: 2021-10-26 | End: 2022-11-07

## 2021-10-26 RX ORDER — PRAVASTATIN SODIUM 40 MG
40 TABLET ORAL DAILY
Qty: 100 TABLET | Refills: 3 | Status: SHIPPED | OUTPATIENT
Start: 2021-10-26 | End: 2022-11-07

## 2021-10-26 RX ORDER — LOSARTAN POTASSIUM 100 MG/1
100 TABLET ORAL DAILY
Qty: 100 TABLET | Refills: 3 | Status: SHIPPED | OUTPATIENT
Start: 2021-10-26 | End: 2022-11-07

## 2021-10-26 ASSESSMENT — FIBROSIS 4 INDEX: FIB4 SCORE: 2.099455524325911871

## 2021-10-26 NOTE — PROGRESS NOTES
Cardiology Clinic Follow-up Note    Date of note:    10/26/2021  Primary Care Provider: Diya Patterson M.D.    Name:             Kip Harper  YOB: 1950  MRN:               [id number]    CC: Chronic Afib    Primary Cardiologist: Cara Hess Pt    Patient HPI:   Kip Harper is a 71 y.o. male with current medical problems including persistant A. fib, chronic anticoagulation, HTN     Interim History:  Mr. Harper was last seen in this cardiology office by Dr. Hess on 4/23/21.  At that time was experiencing mild hematuria, which resolved after stopping his apixaban for 1 day, it was recommended he continue with his oral anticoagulation.     Currently, he is feeling well, has lost 20 lbs in the last 6 months through Keto diet.    Normally BP at home is 115-120's/80's. Patient endorses medication compliance.    He denies blood in urine or stool, palpitations, chest pain, shortness of breath, dyspnea on exertion, dizziness or syncopal episodes, orthopnea, PND, lower extremity swelling, and recent weight gain. Positive complaints of bruising easily.    Cardiovascular Risk Factors:  1. Smoking status: Never  2. Type II Diabetes Mellitus: No  Lab Results   Component Value Date/Time    HBA1C 6.0 (H) 04/22/2021 01:23 PM    HBA1C 6.5 (H) 08/21/2019 11:42 AM   3. Hypertension: yes  4. Dyslipidemia: yes  Cholesterol,Tot   Date Value Ref Range Status   10/22/2021 155 100 - 199 mg/dL Final     LDL   Date Value Ref Range Status   10/22/2021 83 <100 mg/dL Final     HDL   Date Value Ref Range Status   10/22/2021 55 >=40 mg/dL Final     Triglycerides   Date Value Ref Range Status   10/22/2021 84 0 - 149 mg/dL Final     5. Family history of early Coronary Artery Disease in a first degree relative (Male less than 55 years of age; Female less than 65 years of age): Yes  6.  Obesity and/or Metabolic Syndrome: BMI 32.9  7. 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      Past Surgical History:   Procedure Laterality Date   • GASTROSCOPY-ENDO  8/11/2016    Procedure: GASTROSCOPY-ENDO;  Surgeon: Jones Moyer M.D.;  Location: ENDOSCOPY [facility];  Service:      Family History   Problem Relation Age of Onset   • Heart Disease Mother    • Diabetes Mother    • Heart Disease Father    • Lung Disease Father      Social History     Socioeconomic History   • Marital status:      Spouse name: Not on file   • Number of children: Not on file   • Years of education: Not on file   • Highest education level: Not on file   Occupational History   • Not on file   Tobacco Use   • Smoking status: Never Smoker   • Smokeless tobacco: Never Used   Vaping Use   • Vaping Use: Never used   Substance and Sexual Activity   • Alcohol use: No   • Drug use: No   • Sexual activity: Not on file   Other Topics Concern   • Not on file   Social History Narrative   • Not on file     Social Determinants of Health     Financial Resource Strain:    • Difficulty of Paying Living Expenses:    Food Insecurity:    • Worried About Running Out of Food in the Last Year:    • Ran Out of Food in the Last Year:    Transportation Needs:    • Lack of Transportation (Medical):    • Lack of Transportation (Non-Medical):    Physical Activity:    • Days of Exercise per Week:    • Minutes of Exercise per Session:    Stress:    • Feeling of Stress :    Social Connections:    • Frequency of Communication with Friends and Family:    • Frequency of Social Gatherings with Friends and Family:    • Attends Pentecostalism Services:    • Active Member of Clubs or Organizations:    •  "Attends Club or Organization Meetings:    • Marital Status:    Intimate Partner Violence:    • Fear of Current or Ex-Partner:    • Emotionally Abused:    • Physically Abused:    • Sexually Abused:      Allergies   Allergen Reactions   • No Known Drug Allergy      Current Outpatient Medications   Medication Sig Dispense Refill   • losartan (COZAAR) 100 MG Tab Take 1 Tablet by mouth every day. 100 Tablet 3   • pravastatin (PRAVACHOL) 40 MG tablet Take 1 Tablet by mouth every day. 100 Tablet 3   • verapamil ER (CALAN-SR) 240 MG Tab CR Take 1 Tablet by mouth every day. Hold for SBP less than 100, or HR less than 55.  **MUST BE SEEN FOR FURTHER REFILLS, THANK YOU.** 100 Tablet 3   • apixaban (ELIQUIS) 5mg Tab Take 1 Tablet by mouth 2 times a day. 180 Tablet 3   • allopurinol (ZYLOPRIM) 100 MG Tab Take 1 Tab by mouth every day. 90 Tab 3     No current facility-administered medications for this visit.     Physical Exam:  Ambulatory Vitals  /88 (BP Location: Left arm, Patient Position: Sitting, BP Cuff Size: Adult)   Pulse (!) 56   Resp 16   Ht 1.803 m (5' 11\")   Wt 107 kg (236 lb)   SpO2 96%    BP Readings from Last 4 Encounters:   10/26/21 136/88   04/23/21 154/84   04/16/21 138/60   04/08/21 128/82       Weight/BMI: Body mass index is 32.92 kg/m².  Wt Readings from Last 4 Encounters:   10/26/21 107 kg (236 lb)   04/23/21 106 kg (233 lb)   04/16/21 107 kg (236 lb)   04/08/21 107 kg (236 lb)     General: No apparent distress. Well nourished.   Neck: No JVD. No caroid bruits, trachea midline  Lungs: CTAB. Normal effort, without crackles/rhonchi, no wheezing  Heart: Irregularly irregular. Normal S1/S2, no murmur, no rub. no lower extremity edema. 2+ radial pulses, 2+ DT pulses  Ext: No clubbing or cyanosis.  Abdomen: soft, non tender, non distended, no luz elena hepatomegaly.  Neurological: No focal deficits, no facial asymmetry.  Normal speech.  Psychiatric: Appropriate affect, alert and oriented x 4.   Skin: Warm " and dry, no rash.    Lab Data Review:  Lab Results   Component Value Date/Time    CHOLSTRLTOT 155 10/22/2021 08:48 AM    LDL 83 10/22/2021 08:48 AM    HDL 55 10/22/2021 08:48 AM    TRIGLYCERIDE 84 10/22/2021 08:48 AM       Lab Results   Component Value Date/Time    SODIUM 134 (L) 10/22/2021 08:48 AM    POTASSIUM 4.0 10/22/2021 08:48 AM    CHLORIDE 103 10/22/2021 08:48 AM    CO2 21 10/22/2021 08:48 AM    GLUCOSE 97 10/22/2021 08:48 AM    BUN 14 10/22/2021 08:48 AM    CREATININE 0.75 10/22/2021 08:48 AM    BUNCREATRAT 20 06/15/2020 05:05 AM     Lab Results   Component Value Date/Time    ALKPHOSPHAT 48 10/22/2021 08:48 AM    ASTSGOT 20 10/22/2021 08:48 AM    ALTSGPT 12 10/22/2021 08:48 AM    TBILIRUBIN 0.8 10/22/2021 08:48 AM      Lab Results   Component Value Date/Time    WBC 5.9 2021 09:52 AM     Cardiac Imaging and Procedures Review:      EKG 10/26/21: My Personal interpretation reveals Afib 57    Echo 2017:  [facility] reportedly showed   mild mitral regurgitation with moderately dilated left atrium.  Normal left ventricular size, wall motion and wall systolic function.  Ejection fraction was 65%.    Assessment and Clinical Decision Makin. Chronic atrial fibrillation (HCC)  EC-ECHOCARDIOGRAM COMPLETE W/O CONT   2. Chronic anticoagulation  apixaban (ELIQUIS) 5mg Tab   3. Essential hypertension, benign  losartan (COZAAR) 100 MG Tab    verapamil ER (CALAN-SR) 240 MG Tab CR   4. Pure hypercholesterolemia  pravastatin (PRAVACHOL) 40 MG tablet   5. Mitral valve insufficiency, unspecified etiology     6. Non-rheumatic mitral regurgitation  EC-ECHOCARDIOGRAM COMPLETE W/O CONT     The following treatment plan was discussed    Atrial fibrillation, chronic anticoagulation  -Type: Chronic  -Rhythm and Rate: A. fib 57  -Symptoms: Asymptomatic  -[id number] Score 2, inquiring if he can only take aspirin, discussed OAC is recommended to prevent CVA  -Further Testing Recommended: Echocardiogram  -Medications:  Verapamil  mg  -Anticoagulation: Apixaban 5mg BID  -Rate v. Rhythm Control: Rate control, no plans per Dr. Hess  for rhythm control    Hypercholesterolemia  -Recent LDL 83  -Continue pravastatin 40 mg    Hypertension  -Continue losartan 100 mg  -Continue verapamil  mg    Nonrheumatic mitral regurgitation  -Last echocardiogram was from 2017  -We will reassess mitral regurgitation, however currently asymptomatic    Plan reviewed in detail with the patient, verbalizes understanding and is in agreement.  Pt is to follow up with myself in 1 year  Encouraged Pt to follow up with us over the phone or electronically using my [employer] as cardiac issues/concerns arise.      PLEASE NOTE: This dictation was created using voice recognition software. I have made every reasonable attempt to correct obvious errors, but I expect that there are errors of grammar and possibly content that I did not discover before finalizing the note.       KG Keller   [facility]  [phone]    Collaborating Physician: Dr Child

## 2021-11-17 ENCOUNTER — TELEPHONE (OUTPATIENT)
Dept: HEALTH INFORMATION MANAGEMENT | Facility: OTHER | Age: 71
End: 2021-11-17

## 2021-11-17 NOTE — TELEPHONE ENCOUNTER
Outcome: Left Message    Please transfer to Patient Outreach Team at [phone] when patient returns call.    [facility] Verified: yes    Attempt # 2

## 2021-12-27 ENCOUNTER — HOSPITAL ENCOUNTER (OUTPATIENT)
Dept: CARDIOLOGY | Facility: MEDICAL CENTER | Age: 71
End: 2021-12-27
Attending: NURSE PRACTITIONER
Payer: MEDICARE

## 2021-12-27 DIAGNOSIS — I34.0 NON-RHEUMATIC MITRAL REGURGITATION: ICD-10-CM

## 2021-12-27 DIAGNOSIS — I48.20 CHRONIC ATRIAL FIBRILLATION (HCC): ICD-10-CM

## 2021-12-27 LAB
LV EJECT FRACT  99904: 55
LV EJECT FRACT MOD 2C 99903: 65.06
LV EJECT FRACT MOD 4C 99902: 36.88
LV EJECT FRACT MOD BP 99901: 52.34

## 2021-12-27 PROCEDURE — 93306 TTE W/DOPPLER COMPLETE: CPT

## 2021-12-27 PROCEDURE — 93306 TTE W/DOPPLER COMPLETE: CPT | Mod: 26 | Performed by: INTERNAL MEDICINE

## 2022-01-04 DIAGNOSIS — M10.9 GOUT, UNSPECIFIED CAUSE, UNSPECIFIED CHRONICITY, UNSPECIFIED SITE: ICD-10-CM

## 2022-01-04 RX ORDER — ALLOPURINOL 100 MG/1
100 TABLET ORAL
Qty: 90 TABLET | Refills: 3 | Status: CANCELLED | OUTPATIENT
Start: 2022-01-04

## 2022-01-06 NOTE — TELEPHONE ENCOUNTER
Message  Received: Yesterday  KG Keller R.N.  Caller: Unspecified (2 days ago,  3:27 PM)  Yes I like that PCP idea    Thanks   ~MR      Previous Messages     ----- Message -----   From: Parisa Strickland R.N.   Sent: 1/4/2022   5:12 PM PST   To: KG Keller     Hi MR! RX was being filled by Dr. TOMAS. I can refer to PCP? Thanks!   ----- Message -----   From: Stormy Oneill, Med Ass't   Sent: 1/4/2022   3:30 PM PST   To: Parisa Strickland R.N.     ----- Message from Stormy Oneill, Med Ass't sent at 1/4/2022  3:30 PM PST -----   Please advise on Rx request last filled by  last seen by MR, Thank you!   ________________________________________________________________________    RX refused. MyChart message sent to patient with update.

## 2022-02-01 ENCOUNTER — PATIENT MESSAGE (OUTPATIENT)
Dept: HEALTH INFORMATION MANAGEMENT | Facility: OTHER | Age: 72
End: 2022-02-01
Payer: MEDICARE

## 2022-06-01 ENCOUNTER — TELEPHONE (OUTPATIENT)
Dept: HEALTH INFORMATION MANAGEMENT | Facility: OTHER | Age: 72
End: 2022-06-01

## 2022-07-29 ENCOUNTER — HOSPITAL ENCOUNTER (OUTPATIENT)
Dept: LAB | Facility: MEDICAL CENTER | Age: 72
End: 2022-07-29
Attending: FAMILY MEDICINE
Payer: MEDICARE

## 2022-07-29 LAB
ALBUMIN SERPL BCP-MCNC: 4.1 G/DL (ref 3.2–4.9)
ALBUMIN/GLOB SERPL: 1.3 G/DL
ALP SERPL-CCNC: 50 U/L (ref 30–99)
ALT SERPL-CCNC: 13 U/L (ref 2–50)
ANION GAP SERPL CALC-SCNC: 11 MMOL/L (ref 7–16)
APPEARANCE UR: CLEAR
AST SERPL-CCNC: 23 U/L (ref 12–45)
BACTERIA #/AREA URNS HPF: NEGATIVE /HPF
BASOPHILS # BLD AUTO: 0.8 % (ref 0–1.8)
BASOPHILS # BLD: 0.05 K/UL (ref 0–0.12)
BILIRUB SERPL-MCNC: 0.7 MG/DL (ref 0.1–1.5)
BILIRUB UR QL STRIP.AUTO: NEGATIVE
BUN SERPL-MCNC: 14 MG/DL (ref 8–22)
CALCIUM SERPL-MCNC: 9.3 MG/DL (ref 8.5–10.5)
CHLORIDE SERPL-SCNC: 102 MMOL/L (ref 96–112)
CHOLEST SERPL-MCNC: 149 MG/DL (ref 100–199)
CK SERPL-CCNC: 142 U/L (ref 0–154)
CO2 SERPL-SCNC: 24 MMOL/L (ref 20–33)
COLOR UR: YELLOW
CREAT SERPL-MCNC: 1.01 MG/DL (ref 0.5–1.4)
CREAT UR-MCNC: 133.61 MG/DL
CRP SERPL HS-MCNC: <0.3 MG/DL (ref 0–0.75)
EOSINOPHIL # BLD AUTO: 0.09 K/UL (ref 0–0.51)
EOSINOPHIL NFR BLD: 1.5 % (ref 0–6.9)
EPI CELLS #/AREA URNS HPF: NEGATIVE /HPF
ERYTHROCYTE [DISTWIDTH] IN BLOOD BY AUTOMATED COUNT: 45.7 FL (ref 35.9–50)
EST. AVERAGE GLUCOSE BLD GHB EST-MCNC: 131 MG/DL
GFR SERPLBLD CREATININE-BSD FMLA CKD-EPI: 78 ML/MIN/1.73 M 2
GLOBULIN SER CALC-MCNC: 3.1 G/DL (ref 1.9–3.5)
GLUCOSE SERPL-MCNC: 96 MG/DL (ref 65–99)
GLUCOSE UR STRIP.AUTO-MCNC: NEGATIVE MG/DL
HBA1C MFR BLD: 6.2 % (ref 4–5.6)
HCT VFR BLD AUTO: 43.2 % (ref 42–52)
HDLC SERPL-MCNC: 51 MG/DL
HGB BLD-MCNC: 14.6 G/DL (ref 14–18)
HYALINE CASTS #/AREA URNS LPF: ABNORMAL /LPF
IMM GRANULOCYTES # BLD AUTO: 0.02 K/UL (ref 0–0.11)
IMM GRANULOCYTES NFR BLD AUTO: 0.3 % (ref 0–0.9)
KETONES UR STRIP.AUTO-MCNC: NEGATIVE MG/DL
LDLC SERPL CALC-MCNC: 80 MG/DL
LEUKOCYTE ESTERASE UR QL STRIP.AUTO: NEGATIVE
LYMPHOCYTES # BLD AUTO: 1.5 K/UL (ref 1–4.8)
LYMPHOCYTES NFR BLD: 24.9 % (ref 22–41)
MCH RBC QN AUTO: 31.5 PG (ref 27–33)
MCHC RBC AUTO-ENTMCNC: 33.8 G/DL (ref 33.7–35.3)
MCV RBC AUTO: 93.1 FL (ref 81.4–97.8)
MICRO URNS: ABNORMAL
MICROALBUMIN UR-MCNC: 4.7 MG/DL
MICROALBUMIN/CREAT UR: 35 MG/G (ref 0–30)
MONOCYTES # BLD AUTO: 0.7 K/UL (ref 0–0.85)
MONOCYTES NFR BLD AUTO: 11.6 % (ref 0–13.4)
NEUTROPHILS # BLD AUTO: 3.67 K/UL (ref 1.82–7.42)
NEUTROPHILS NFR BLD: 60.9 % (ref 44–72)
NITRITE UR QL STRIP.AUTO: NEGATIVE
NRBC # BLD AUTO: 0 K/UL
NRBC BLD-RTO: 0 /100 WBC
PH UR STRIP.AUTO: 6 [PH] (ref 5–8)
PLATELET # BLD AUTO: 208 K/UL (ref 164–446)
PMV BLD AUTO: 10.9 FL (ref 9–12.9)
POTASSIUM SERPL-SCNC: 3.8 MMOL/L (ref 3.6–5.5)
PROT SERPL-MCNC: 7.2 G/DL (ref 6–8.2)
PROT UR QL STRIP: NEGATIVE MG/DL
PSA SERPL-MCNC: 1.19 NG/ML (ref 0–4)
RBC # BLD AUTO: 4.64 M/UL (ref 4.7–6.1)
RBC # URNS HPF: ABNORMAL /HPF
RBC UR QL AUTO: ABNORMAL
SODIUM SERPL-SCNC: 137 MMOL/L (ref 135–145)
SP GR UR STRIP.AUTO: 1.02
TRIGL SERPL-MCNC: 92 MG/DL (ref 0–149)
TSH SERPL DL<=0.005 MIU/L-ACNC: 2.63 UIU/ML (ref 0.38–5.33)
URATE SERPL-MCNC: 6.1 MG/DL (ref 2.5–8.3)
UROBILINOGEN UR STRIP.AUTO-MCNC: 1 MG/DL
WBC # BLD AUTO: 6 K/UL (ref 4.8–10.8)
WBC #/AREA URNS HPF: ABNORMAL /HPF

## 2022-07-29 PROCEDURE — 85025 COMPLETE CBC W/AUTO DIFF WBC: CPT

## 2022-07-29 PROCEDURE — 84443 ASSAY THYROID STIM HORMONE: CPT

## 2022-07-29 PROCEDURE — 84550 ASSAY OF BLOOD/URIC ACID: CPT

## 2022-07-29 PROCEDURE — 80053 COMPREHEN METABOLIC PANEL: CPT

## 2022-07-29 PROCEDURE — 86140 C-REACTIVE PROTEIN: CPT

## 2022-07-29 PROCEDURE — 82570 ASSAY OF URINE CREATININE: CPT

## 2022-07-29 PROCEDURE — 36415 COLL VENOUS BLD VENIPUNCTURE: CPT

## 2022-07-29 PROCEDURE — 82550 ASSAY OF CK (CPK): CPT

## 2022-07-29 PROCEDURE — 80061 LIPID PANEL: CPT

## 2022-07-29 PROCEDURE — 81001 URINALYSIS AUTO W/SCOPE: CPT

## 2022-07-29 PROCEDURE — 84153 ASSAY OF PSA TOTAL: CPT

## 2022-07-29 PROCEDURE — 83036 HEMOGLOBIN GLYCOSYLATED A1C: CPT

## 2022-07-29 PROCEDURE — 82043 UR ALBUMIN QUANTITATIVE: CPT

## 2022-09-01 ENCOUNTER — HOSPITAL ENCOUNTER (OUTPATIENT)
Facility: MEDICAL CENTER | Age: 72
End: 2022-09-01
Attending: PHYSICIAN ASSISTANT
Payer: MEDICARE

## 2022-09-01 LAB
BUN SERPL-MCNC: 17 MG/DL (ref 8–22)
CREAT SERPL-MCNC: 1 MG/DL (ref 0.5–1.4)
GFR SERPLBLD CREATININE-BSD FMLA CKD-EPI: 79 ML/MIN/1.73 M 2

## 2022-09-01 PROCEDURE — 82565 ASSAY OF CREATININE: CPT

## 2022-09-01 PROCEDURE — 84520 ASSAY OF UREA NITROGEN: CPT

## 2022-10-31 ENCOUNTER — DOCUMENTATION (OUTPATIENT)
Dept: HEALTH INFORMATION MANAGEMENT | Facility: OTHER | Age: 72
End: 2022-10-31
Payer: MEDICARE

## 2022-11-02 ENCOUNTER — TELEPHONE (OUTPATIENT)
Dept: CARDIOLOGY | Facility: MEDICAL CENTER | Age: 72
End: 2022-11-02
Payer: MEDICARE

## 2022-11-02 DIAGNOSIS — Z79.01 CHRONIC ANTICOAGULATION: ICD-10-CM

## 2022-11-02 NOTE — TELEPHONE ENCOUNTER
Is the patient due for a refill? Yes    Was the patient seen the past year? No    Date of last office visit: 10/26/2022    Does the patient have an upcoming appointment?  Yes   If yes, When? 12/2/2022    Provider to refill:    Does the patients insurance require a 100 day supply?  Yes

## 2022-11-02 NOTE — TELEPHONE ENCOUNTER
MR    Caller:  Dilshad CamargoKip    Medication Name and Dosage:    apixaban (ELIQUIS) 5mg Tab [[id number]]    Medication amount left: 1 week    [facility]     Other questions (Topic): N/A    Callback Number (Will only call for issues): [phone]    Thank you   Zoe MARC

## 2022-11-05 DIAGNOSIS — E78.00 PURE HYPERCHOLESTEROLEMIA: ICD-10-CM

## 2022-11-05 DIAGNOSIS — I10 ESSENTIAL HYPERTENSION, BENIGN: ICD-10-CM

## 2022-11-07 RX ORDER — LOSARTAN POTASSIUM 100 MG/1
100 TABLET ORAL DAILY
Qty: 100 TABLET | Refills: 0 | Status: SHIPPED | OUTPATIENT
Start: 2022-11-07 | End: 2022-12-02 | Stop reason: SDUPTHER

## 2022-11-07 RX ORDER — PRAVASTATIN SODIUM 40 MG
40 TABLET ORAL DAILY
Qty: 100 TABLET | Refills: 0 | Status: SHIPPED | OUTPATIENT
Start: 2022-11-07 | End: 2022-12-02 | Stop reason: SDUPTHER

## 2022-11-07 RX ORDER — VERAPAMIL HYDROCHLORIDE 240 MG/1
240 TABLET, FILM COATED, EXTENDED RELEASE ORAL DAILY
Qty: 100 TABLET | Refills: 0 | Status: SHIPPED | OUTPATIENT
Start: 2022-11-07 | End: 2022-12-02 | Stop reason: SDUPTHER

## 2022-11-07 NOTE — TELEPHONE ENCOUNTER
Upon chart review pt last seen 10/26/21.  FV scheduled with PAC 12/2/2022.    90 day courtesy refill sent.

## 2022-11-07 NOTE — TELEPHONE ENCOUNTER
Is the patient due for a refill? Yes    Was the patient seen the past year? Yes    Date of last office visit: 12/26/21    Does the patient have an upcoming appointment?  Yes   If yes, When? 12/2/22    Provider to refill:MR    Does the patients insurance require a 100 day supply?  YES

## 2022-11-18 ENCOUNTER — TELEPHONE (OUTPATIENT)
Dept: CARDIOLOGY | Facility: MEDICAL CENTER | Age: 72
End: 2022-11-18
Payer: MEDICARE

## 2022-11-18 DIAGNOSIS — Z79.01 CHRONIC ANTICOAGULATION: ICD-10-CM

## 2022-11-18 PROCEDURE — RXMED WILLOW AMBULATORY MEDICATION CHARGE: Performed by: NURSE PRACTITIONER

## 2022-11-18 NOTE — TELEPHONE ENCOUNTER
[facility]    [address] [place] [address]    Phone:  [phone]    Fax:  [phone]       Thank You for choosing us for your health care visit. We are glad to serve you and happy to provide you with this summary of your visit. Please help us to ensure we have accurate records. If you find anything that needs to be changed, please let our staff know as soon as possible.          Your Demographic Information     Patient Name Sex     Ifeanyi Moffett Male 1962       Ethnic Group Patient Race    Not of  or  Origin White      Your Visit Details     Date & Time Provider Department    2017 3:45 PM Kirit Flores MD [facility]      Your Upcoming Appointment*(Max 10)     2017  7:45 AM CDT   Worker's Comp Follow Up Visit with Kirit Flores MD   [facility] ([facility])    [address] [place] [address]   [phone]            2017  5:00 PM CDT   Office Visit with Abhishek Kohli MD   [facility] ([facility])    [address] [place] [address]   [phone]              Your To Do List     Follow-Up    Return in about 3 months (around 2017).      We Ordered or Performed the Following     XR FOOT 3+ VW RIGHT       Conditions Discussed Today or Order-Related Diagnoses        Comments    Pain of foot, unspecified laterality    -  Primary       Your Vitals Were     Smoking Status                   Never Smoker           Medications Prescribed or Re-Ordered Today     None      Your Current Medications Are        Disp Refills Start End    moxifloxacin (VIGAMOX) 0.5 % ophthalmic solution 1 Bottle 3 2017     Sig - Route: Place 1 drop into right eye 4 times daily. Instruct [id number]. May substitute Ciloxan. - Right  MR    Caller: Kip Harper    Topic/issue: ELIQUIS    Patient would like to know if he can get samples of this medication sent to the [facility] pharmacy. Patient states that the medication is to expensive at his regular pharmacy. Please advise.    Thank you,  Lele WATTS    Callback Number: [phone] (home)        Eye    Class: Eprescribe    Ophthalmic Irrigation Solution (RA STERILE EYE WASH) Solution 1 Bottle 3 1/31/2017     Sig: Sterile solution soaked eye pads may be used over closed lids between applications of medicated drops to soothe eye.    Class: Eprescribe    ondansetron (ZOFRAN ODT) 4 MG disintegrating tablet 10 tablet 0 1/31/2017     Sig - Route: Take 1 tablet by mouth every 8 hours as needed for Nausea. May substitute with 25mg Compazine suppository. - Oral    Class: Eprescribe    EPINEPHrine (EPIPEN 2-SAMANTHA) 0.3 MG/0.3ML Solution Auto-injector 2 each 1 8/1/2016     Sig - Route: Inject 0.3 mLs into the muscle once as needed for Anaphylaxis. - Intramuscular    Class: Eprescribe    Cosign for Ordering: Accepted by Manjinder Chowdhury MD on 8/1/2016 12:18 PM    aspirin 81 MG tablet        Sig - Route: Take 81 mg by mouth daily. - Oral    Class: Historical Med    vitamin - therapeutic multivitamins w/minerals (CENTRUM SILVER,THERA-M) TABS        Sig - Route: Take 1 tablet by mouth daily. - Oral    Class: Historical Med    Ascorbic Acid (VITAMIN C) 100 MG tablet        Sig - Route: Take 500 mg by mouth daily.  - Oral    Class: Historical Med    vitamin E 400 UNIT capsule        Sig - Route: Take 400 Units by mouth daily. - Oral    Class: Historical Med    prednisoLONE acetate (PRED FORTE, OMNIPRED) 1 % ophthalmic suspension 1 Bottle 3 1/31/2017     Sig - Route: Place 1 drop into right eye 4 times daily. Instruct [id number] - Right Eye    Class: Eprescribe    traMADOL (ULTRAM) 50 MG tablet 15 tablet 0 1/31/2017     Sig - Route: Take 1 tablet by mouth every 6 hours as needed for Pain. - Oral      Allergies     Hornet Venom ANAPHYLAXIS    Oxycontin PRURITUS      Immunizations History as of 4/17/2017     Name Date    Influenza 10/19/2011    Influenza Recombinant Pres Free,egg Free 10/19/2013  8:45 AM    Td:Adult type tetanus/diphtheria 8/21/2009      Problem List as of 4/17/2017     Closed nondisplaced fracture of  fifth metatarsal bone      Results of Testing Done Today             Patient Instructions     None

## 2022-11-18 NOTE — TELEPHONE ENCOUNTER
Is the patient due for a refill? No- pt requesting samples    Was the patient seen the past year? No    Date of last office visit: 10/26/2021    Does the patient have an upcoming appointment?  Yes   If yes, When? 12/2/2022    Provider to refill:    Does the patients insurance require a 100 day supply?  Yes

## 2022-12-01 ENCOUNTER — PHARMACY VISIT (OUTPATIENT)
Dept: PHARMACY | Facility: MEDICAL CENTER | Age: 72
End: 2022-12-01
Payer: COMMERCIAL

## 2022-12-02 ENCOUNTER — OFFICE VISIT (OUTPATIENT)
Dept: CARDIOLOGY | Facility: MEDICAL CENTER | Age: 72
End: 2022-12-02
Payer: MEDICARE

## 2022-12-02 VITALS
SYSTOLIC BLOOD PRESSURE: 130 MMHG | BODY MASS INDEX: 34.86 KG/M2 | WEIGHT: 249 LBS | DIASTOLIC BLOOD PRESSURE: 76 MMHG | OXYGEN SATURATION: 96 % | RESPIRATION RATE: 17 BRPM | HEIGHT: 71 IN | HEART RATE: 89 BPM

## 2022-12-02 DIAGNOSIS — E78.00 PURE HYPERCHOLESTEROLEMIA: ICD-10-CM

## 2022-12-02 DIAGNOSIS — I10 ESSENTIAL HYPERTENSION, BENIGN: ICD-10-CM

## 2022-12-02 DIAGNOSIS — I34.0 NON-RHEUMATIC MITRAL REGURGITATION: ICD-10-CM

## 2022-12-02 DIAGNOSIS — I48.20 CHRONIC ATRIAL FIBRILLATION (HCC): ICD-10-CM

## 2022-12-02 DIAGNOSIS — Z79.01 CHRONIC ANTICOAGULATION: ICD-10-CM

## 2022-12-02 LAB — EKG IMPRESSION: NORMAL

## 2022-12-02 PROCEDURE — 99214 OFFICE O/P EST MOD 30 MIN: CPT | Performed by: PHYSICIAN ASSISTANT

## 2022-12-02 PROCEDURE — 93000 ELECTROCARDIOGRAM COMPLETE: CPT | Performed by: INTERNAL MEDICINE

## 2022-12-02 RX ORDER — LOSARTAN POTASSIUM 100 MG/1
100 TABLET ORAL DAILY
Qty: 90 TABLET | Refills: 3 | Status: SHIPPED | OUTPATIENT
Start: 2022-12-02 | End: 2023-10-24

## 2022-12-02 RX ORDER — VERAPAMIL HYDROCHLORIDE 240 MG/1
240 TABLET, FILM COATED, EXTENDED RELEASE ORAL DAILY
Qty: 90 TABLET | Refills: 3 | Status: SHIPPED | OUTPATIENT
Start: 2022-12-02 | End: 2024-02-05

## 2022-12-02 RX ORDER — PRAVASTATIN SODIUM 40 MG
40 TABLET ORAL DAILY
Qty: 90 TABLET | Refills: 3 | Status: SHIPPED | OUTPATIENT
Start: 2022-12-02 | End: 2024-02-05

## 2022-12-02 ASSESSMENT — ENCOUNTER SYMPTOMS
LOSS OF CONSCIOUSNESS: 0
FEVER: 0
ORTHOPNEA: 0
ABDOMINAL PAIN: 0
DIZZINESS: 0
COUGH: 0
EYES NEGATIVE: 1
HEADACHES: 0
NEUROLOGICAL NEGATIVE: 1
MUSCULOSKELETAL NEGATIVE: 1
CHILLS: 0
MYALGIAS: 0
CONSTITUTIONAL NEGATIVE: 1
GASTROINTESTINAL NEGATIVE: 1
NAUSEA: 0
VOMITING: 0
WEAKNESS: 0
PSYCHIATRIC NEGATIVE: 1
CLAUDICATION: 0
DOUBLE VISION: 0
BRUISES/BLEEDS EASILY: 0
SHORTNESS OF BREATH: 0
PALPITATIONS: 0
BLURRED VISION: 0
PND: 0

## 2022-12-02 ASSESSMENT — FIBROSIS 4 INDEX: FIB4 SCORE: 2.24

## 2022-12-02 NOTE — PROGRESS NOTES
Chief Complaint   Patient presents with    Atrial Fibrillation     D/V DX: Chronic atrial fibrillation (HCC)        Subjective     Kip Harper is a 72 y.o. male A. Fib on chronic anticoagulation, HTN and mitral valve insufficieny who presents today for 1 year follow up.     He is a prior patient of Dr. Hess.  Last seen 10/26/2021 by CANDIDA Godwin.  At that exam, he was feeling well and has lost 20 pounds in the past 6 months through the keto diet.  His blood pressures were well controlled and he denied any cardiac symptoms.  His medications were continued and he was recommended to complete an echocardiogram which revealed no significant changes.  He was to follow-up in 1 year.    Today, he reports that he has been doing well over the past year. No chest pain or palpitations. No shortness of breath, dyspnea on exertion, orthopnea or PND. No lower extremity edema. No dizziness or lightheadedness. No syncope or presyncope.      His wife has cancer and so this causes a lot of stress occasionally it will affect with blood pressure with occasional highs, but he reports that his blood pressures at home are on average 132-130/80-83.    He continues to stay active by walking around a lot at work. He works at an [employer] on [employer].  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      Past Surgical History:   Procedure Laterality Date    GASTROSCOPY-ENDO  8/11/2016    Procedure: GASTROSCOPY-ENDO;  Surgeon: Jones Moyer M.D.;  Location: ENDOSCOPY [facility];  Service:      Family History   Problem Relation Age of Onset    Heart Disease Mother     Diabetes Mother     Heart Disease Father     Lung Disease Father      Social History     Socioeconomic History    Marital status:      Spouse name: Not on  file    Number of children: Not on file    Years of education: Not on file    Highest education level: Not on file   Occupational History    Not on file   Tobacco Use    Smoking status: Never    Smokeless tobacco: Never   Vaping Use    Vaping Use: Never used   Substance and Sexual Activity    Alcohol use: No    Drug use: No    Sexual activity: Not on file   Other Topics Concern    Not on file   Social History Narrative    Not on file     Social Determinants of Health     Financial Resource Strain: Not on file   Food Insecurity: Not on file   Transportation Needs: Not on file   Physical Activity: Not on file   Stress: Not on file   Social Connections: Not on file   Intimate Partner Violence: Not on file   Housing Stability: Not on file     Allergies   Allergen Reactions    No Known Drug Allergy      Outpatient Encounter Medications as of 12/2/2022   Medication Sig Dispense Refill    apixaban (ELIQUIS) 5mg Tab Take 1 Tablet by mouth 2 times a day. 180 Tablet 3    losartan (COZAAR) 100 MG Tab Take 1 Tablet by mouth every day. 90 Tablet 3    pravastatin (PRAVACHOL) 40 MG tablet Take 1 Tablet by mouth every day. 90 Tablet 3    verapamil ER (CALAN-SR) 240 MG Tab CR Take 1 Tablet by mouth every day. HOLD FOR SBP LESS THAN 100, OR HR LESS THAN 55 90 Tablet 3    diclofenac DR (VOLTAREN) 50 MG Tablet Delayed Response Take 50 mg by mouth 1 time a day as needed.      allopurinol (ZYLOPRIM) 100 MG Tab Take 1 Tab by mouth every day. 90 Tab 3    [DISCONTINUED] apixaban (ELIQUIS) 5mg Tab Take 1 Tablet by mouth 2 times a day. **SAMPLE** 60 Tablet 0    [DISCONTINUED] losartan (COZAAR) 100 MG Tab Take 1 Tablet by mouth every day. **LAST SEEN 10/2021 .  TO ENSURE FURTHER REFILLS, KEEP SCHEDULED FOLLOW UP VISIT 12/2022.** 100 Tablet 0    [DISCONTINUED] verapamil ER (CALAN-SR) 240 MG Tab CR Take 1 Tablet by mouth every day. HOLD FOR SBP LESS THAN 100, OR HR LESS THAN 55 **LAST SEEN 10/2021 .  TO ENSURE FURTHER REFILLS, KEEP SCHEDULED  "FOLLOW UP VISIT 12/2022.** 100 Tablet 0    [DISCONTINUED] pravastatin (PRAVACHOL) 40 MG tablet Take 1 Tablet by mouth every day. **LAST SEEN 10/2021 .  TO ENSURE FURTHER REFILLS, KEEP SCHEDULED FOLLOW UP VISIT 12/2022.** 100 Tablet 0     No facility-administered encounter medications on file as of 12/2/2022.     Review of Systems   Constitutional: Negative.  Negative for chills, fever and malaise/fatigue.   HENT: Negative.     Eyes: Negative.  Negative for blurred vision and double vision.   Respiratory:  Negative for cough and shortness of breath.    Cardiovascular:  Negative for chest pain, palpitations, orthopnea, claudication, leg swelling and PND.   Gastrointestinal: Negative.  Negative for abdominal pain, nausea and vomiting.   Genitourinary: Negative.    Musculoskeletal: Negative.  Negative for myalgias.   Skin: Negative.  Negative for rash.   Neurological: Negative.  Negative for dizziness, loss of consciousness, weakness and headaches.   Endo/Heme/Allergies: Negative.  Does not bruise/bleed easily.   Psychiatric/Behavioral: Negative.              Objective     /76   Pulse 89   Resp 17   Ht 1.811 m (5' 11.3\")   Wt 113 kg (249 lb)   SpO2 96%   BMI 34.44 kg/m²     Physical Exam  Vitals reviewed.   Constitutional:       General: He is not in acute distress.     Appearance: Normal appearance.   HENT:      Head: Normocephalic and atraumatic.      Right Ear: External ear normal.      Left Ear: External ear normal.   Eyes:      General: No scleral icterus.     Extraocular Movements: Extraocular movements intact.      Conjunctiva/sclera: Conjunctivae normal.      Pupils: Pupils are equal, round, and reactive to light.   Cardiovascular:      Rate and Rhythm: Normal rate and regular rhythm.      Pulses: Normal pulses.      Heart sounds: Normal heart sounds. No murmur heard.    No friction rub. No gallop.   Pulmonary:      Effort: Pulmonary effort is normal.      Breath sounds: Normal breath sounds. "   Abdominal:      General: Bowel sounds are normal.      Palpations: Abdomen is soft.      Tenderness: There is no abdominal tenderness.   Musculoskeletal:         General: Normal range of motion.      Cervical back: Normal range of motion and neck supple.      Right lower leg: No edema.      Left lower leg: No edema.   Skin:     General: Skin is warm and dry.      Capillary Refill: Capillary refill takes less than 2 seconds.   Neurological:      General: No focal deficit present.      Mental Status: He is alert and oriented to person, place, and time.   Psychiatric:         Mood and Affect: Mood normal.         Behavior: Behavior normal.         Judgment: Judgment normal.       Lab Results   Component Value Date/Time    CHOLSTRLTOT 149 07/29/2022 12:18 PM    LDL 80 07/29/2022 12:18 PM    HDL 51 07/29/2022 12:18 PM    TRIGLYCERIDE 92 07/29/2022 12:18 PM       Lab Results   Component Value Date/Time    SODIUM 137 07/29/2022 12:18 PM    POTASSIUM 3.8 07/29/2022 12:18 PM    CHLORIDE 102 07/29/2022 12:18 PM    CO2 24 07/29/2022 12:18 PM    GLUCOSE 96 07/29/2022 12:18 PM    BUN 17 09/01/2022 08:50 AM    CREATININE 1.00 09/01/2022 08:50 AM    BUNCREATRAT 20 06/15/2020 05:05 AM     Lab Results   Component Value Date/Time    ALKPHOSPHAT 50 07/29/2022 12:18 PM    ASTSGOT 23 07/29/2022 12:18 PM    ALTSGPT 13 07/29/2022 12:18 PM    TBILIRUBIN 0.7 07/29/2022 12:18 PM       Cardiovascular imaging and procedures:    EKG 12/2/2022:  Personally interpreted by me as atrial fibrillation at a rate of 77 with nonspecific ST-T wave changes    Echo 12/6/2017:  [facility] reportedly showed   mild mitral regurgitation with moderately dilated left atrium.  Normal left ventricular size, wall motion and wall systolic function.  Ejection fraction was 65%.    Echo 12/27/2021  CONCLUSIONS  No prior study is available for comparison.   Normal left ventricular chamber size.  The left ventricular ejection fraction is visually estimated to be  55%.  Mild mitral regurgitation.  Normal inferior vena cava size and inspiratory collapse.  Estimated right ventricular systolic pressure is 35 mmHg.     Assessment & Plan     1. Paroxysmal atrial fibrillation (HCC)  EKG      2. Chronic anticoagulation  apixaban (ELIQUIS) 5mg Tab      3. Essential hypertension, benign  losartan (COZAAR) 100 MG Tab    verapamil ER (CALAN-SR) 240 MG Tab CR      4. Pure hypercholesterolemia  pravastatin (PRAVACHOL) 40 MG tablet        Medical Decision Making: Today's Assessment/Status/Plan:      Chronic Atrial fibrillation  Chronic anticoagulation  - Asymptomatic  -EKG performed today demonstrates atrial fibrillation at a rate of 77  -[id number] Score 2, inquiring if he can only take aspirin, discussed OAC is recommended to prevent CVA  - Continue anticoagulation with Eliquis 5mg BID  -Continue Verapamil  mg  -Given longstanding afib, asymptomatic, and normal LVEF no indication for rhythm control at this time.      Hypercholesterolemia  -Recent LDL 80; goal<100  -Repeat lipid panel yearly  -Continue pravastatin 40 mg     Hypertension  -Well controlled at home and in office  - Electrolytes and renal function excellent on recent labs   -Continue losartan 100 mg and verapamil  mg     Nonrheumatic mitral regurgitation  -Asymptomatic  -Mild on echocardiogram performed 12/27/2021; no significant changes compared to prior echo on 12/6/2017.  -Repeat echocardiogram every 3 to 5 years.    Follow-up in 1 year or sooner with changes in clinical condition.    Encouraged him to reach out via MyChart or telephone with any questions or concerns.    Thank you for allowing me to participate in the care of Kip Harper .    Keila Cross PA-C, Cardiology  [facility]  [address] [address]  Phone: [phone]  Fax: [phone]    PLEASE NOTE: This Note was created using voice  recognition Software. I have made every reasonable attempt to correct obvious errors, but I expect that there are errors of grammar and possibly content that I did not discover before finalizing the note.

## 2023-07-22 DIAGNOSIS — Z79.01 CHRONIC ANTICOAGULATION: ICD-10-CM

## 2023-07-22 RX ORDER — APIXABAN 5 MG/1
5 TABLET, FILM COATED ORAL 2 TIMES DAILY
Qty: 180 TABLET | Refills: 3 | Status: SHIPPED | OUTPATIENT
Start: 2023-07-22 | End: 2023-10-25 | Stop reason: SDUPTHER

## 2023-08-22 ENCOUNTER — APPOINTMENT (OUTPATIENT)
Dept: RADIOLOGY | Facility: MEDICAL CENTER | Age: 73
End: 2023-08-22
Attending: PHYSICIAN ASSISTANT
Payer: MEDICARE

## 2023-09-05 ENCOUNTER — TELEPHONE (OUTPATIENT)
Dept: HEALTH INFORMATION MANAGEMENT | Facility: OTHER | Age: 73
End: 2023-09-05

## 2023-09-13 ENCOUNTER — HOSPITAL ENCOUNTER (OUTPATIENT)
Dept: LAB | Facility: MEDICAL CENTER | Age: 73
End: 2023-09-13
Attending: FAMILY MEDICINE
Payer: MEDICARE

## 2023-09-13 LAB
BASOPHILS # BLD AUTO: 0.9 % (ref 0–1.8)
BASOPHILS # BLD: 0.06 K/UL (ref 0–0.12)
EOSINOPHIL # BLD AUTO: 0.13 K/UL (ref 0–0.51)
EOSINOPHIL NFR BLD: 1.9 % (ref 0–6.9)
ERYTHROCYTE [DISTWIDTH] IN BLOOD BY AUTOMATED COUNT: 45.2 FL (ref 35.9–50)
EST. AVERAGE GLUCOSE BLD GHB EST-MCNC: 140 MG/DL
HBA1C MFR BLD: 6.5 % (ref 4–5.6)
HCT VFR BLD AUTO: 49.5 % (ref 42–52)
HGB BLD-MCNC: 16.3 G/DL (ref 14–18)
IMM GRANULOCYTES # BLD AUTO: 0.01 K/UL (ref 0–0.11)
IMM GRANULOCYTES NFR BLD AUTO: 0.1 % (ref 0–0.9)
LYMPHOCYTES # BLD AUTO: 2.06 K/UL (ref 1–4.8)
LYMPHOCYTES NFR BLD: 30.7 % (ref 22–41)
MCH RBC QN AUTO: 31.7 PG (ref 27–33)
MCHC RBC AUTO-ENTMCNC: 32.9 G/DL (ref 32.3–36.5)
MCV RBC AUTO: 96.3 FL (ref 81.4–97.8)
MONOCYTES # BLD AUTO: 0.77 K/UL (ref 0–0.85)
MONOCYTES NFR BLD AUTO: 11.5 % (ref 0–13.4)
NEUTROPHILS # BLD AUTO: 3.68 K/UL (ref 1.82–7.42)
NEUTROPHILS NFR BLD: 54.9 % (ref 44–72)
NRBC # BLD AUTO: 0 K/UL
NRBC BLD-RTO: 0 /100 WBC (ref 0–0.2)
PLATELET # BLD AUTO: 214 K/UL (ref 164–446)
PMV BLD AUTO: 10.7 FL (ref 9–12.9)
PSA SERPL-MCNC: 1.27 NG/ML (ref 0–4)
RBC # BLD AUTO: 5.14 M/UL (ref 4.7–6.1)
WBC # BLD AUTO: 6.7 K/UL (ref 4.8–10.8)

## 2023-09-13 PROCEDURE — 84153 ASSAY OF PSA TOTAL: CPT

## 2023-09-13 PROCEDURE — 83036 HEMOGLOBIN GLYCOSYLATED A1C: CPT

## 2023-09-13 PROCEDURE — 82043 UR ALBUMIN QUANTITATIVE: CPT

## 2023-09-13 PROCEDURE — 85025 COMPLETE CBC W/AUTO DIFF WBC: CPT

## 2023-09-13 PROCEDURE — 80053 COMPREHEN METABOLIC PANEL: CPT

## 2023-09-13 PROCEDURE — 84550 ASSAY OF BLOOD/URIC ACID: CPT

## 2023-09-13 PROCEDURE — 36415 COLL VENOUS BLD VENIPUNCTURE: CPT

## 2023-09-13 PROCEDURE — 80061 LIPID PANEL: CPT

## 2023-09-13 PROCEDURE — 82570 ASSAY OF URINE CREATININE: CPT

## 2023-09-14 LAB
ALBUMIN SERPL BCP-MCNC: 4.4 G/DL (ref 3.2–4.9)
ALBUMIN/GLOB SERPL: 1.3 G/DL
ALP SERPL-CCNC: 54 U/L (ref 30–99)
ALT SERPL-CCNC: 17 U/L (ref 2–50)
ANION GAP SERPL CALC-SCNC: 8 MMOL/L (ref 7–16)
AST SERPL-CCNC: 28 U/L (ref 12–45)
BILIRUB SERPL-MCNC: 0.9 MG/DL (ref 0.1–1.5)
BUN SERPL-MCNC: 14 MG/DL (ref 8–22)
CALCIUM ALBUM COR SERPL-MCNC: 8.9 MG/DL (ref 8.5–10.5)
CALCIUM SERPL-MCNC: 9.2 MG/DL (ref 8.5–10.5)
CHLORIDE SERPL-SCNC: 101 MMOL/L (ref 96–112)
CHOLEST SERPL-MCNC: 162 MG/DL (ref 100–199)
CO2 SERPL-SCNC: 28 MMOL/L (ref 20–33)
CREAT SERPL-MCNC: 1.01 MG/DL (ref 0.5–1.4)
CREAT UR-MCNC: 143.35 MG/DL
FASTING STATUS PATIENT QL REPORTED: NORMAL
GFR SERPLBLD CREATININE-BSD FMLA CKD-EPI: 78 ML/MIN/1.73 M 2
GLOBULIN SER CALC-MCNC: 3.4 G/DL (ref 1.9–3.5)
GLUCOSE SERPL-MCNC: 99 MG/DL (ref 65–99)
HDLC SERPL-MCNC: 51 MG/DL
LDLC SERPL CALC-MCNC: 95 MG/DL
MICROALBUMIN UR-MCNC: 14.7 MG/DL
MICROALBUMIN/CREAT UR: 103 MG/G (ref 0–30)
POTASSIUM SERPL-SCNC: 4.2 MMOL/L (ref 3.6–5.5)
PROT SERPL-MCNC: 7.8 G/DL (ref 6–8.2)
SODIUM SERPL-SCNC: 137 MMOL/L (ref 135–145)
TRIGL SERPL-MCNC: 80 MG/DL (ref 0–149)
URATE SERPL-MCNC: 7.2 MG/DL (ref 2.5–8.3)

## 2023-10-23 ENCOUNTER — TELEPHONE (OUTPATIENT)
Dept: CARDIOLOGY | Facility: MEDICAL CENTER | Age: 73
End: 2023-10-23
Payer: MEDICARE

## 2023-10-23 DIAGNOSIS — I10 ESSENTIAL HYPERTENSION, BENIGN: ICD-10-CM

## 2023-10-24 RX ORDER — LOSARTAN POTASSIUM 100 MG/1
100 TABLET ORAL DAILY
Qty: 100 TABLET | Refills: 3 | Status: SHIPPED | OUTPATIENT
Start: 2023-10-24

## 2023-10-24 NOTE — TELEPHONE ENCOUNTER
Is the patient due for a refill? Yes    Was the patient seen the past year? Yes    Date of last office visit: 12/2/22    Does the patient have an upcoming appointment?  Yes   If yes, When? 12/6/23    Provider to refill:AM    Does the patients insurance require a 100 day supply?  Yes

## 2023-10-25 ENCOUNTER — TELEPHONE (OUTPATIENT)
Dept: CARDIOLOGY | Facility: MEDICAL CENTER | Age: 73
End: 2023-10-25
Payer: MEDICARE

## 2023-10-25 DIAGNOSIS — Z79.01 CHRONIC ANTICOAGULATION: ICD-10-CM

## 2023-10-25 PROCEDURE — RXMED WILLOW AMBULATORY MEDICATION CHARGE: Performed by: NURSE PRACTITIONER

## 2023-10-25 NOTE — TELEPHONE ENCOUNTER
Ok to refill, please help patient with assistance or refer over to anticoagulation clinic to switch over to a different medication due to cost if patient does not qualify.  If there is a sample available, please provide for patient.

## 2023-10-25 NOTE — TELEPHONE ENCOUNTER
AM     Caller: Kip    Medication Name and Dosage:   apixaban (ELIQUIS) 5mg Tab [[id number]]    Medication amount left: 2    [facility]    Other questions (Topic):   Cost is $388.00 - he would like to get samples.  He would also like to know about patents assistance for this medication    Callback Number (Will only call for issues): [phone]    Thank you,   Zoe MARC

## 2023-10-25 NOTE — TELEPHONE ENCOUNTER
Phone Number Called: [phone]    Call outcome: Did not leave a detailed message. Requested patient to call back.    Message: Called to relay information regarding Eliquis. Prescription for samples sent to [facility] [address] . Message sent to pharmacy to assist with Eliquis patient assistance.

## 2023-10-26 ENCOUNTER — TELEPHONE (OUTPATIENT)
Dept: CARDIOLOGY | Facility: MEDICAL CENTER | Age: 73
End: 2023-10-26
Payer: MEDICARE

## 2023-10-26 ENCOUNTER — PHARMACY VISIT (OUTPATIENT)
Dept: PHARMACY | Facility: MEDICAL CENTER | Age: 73
End: 2023-10-26
Payer: COMMERCIAL

## 2023-10-26 NOTE — TELEPHONE ENCOUNTER
AM    Caller: Kip Harper     Topic/issue: Pt returning call    Callback Number: [phone] (home)      Thank You    Nuria STEELE

## 2023-10-26 NOTE — TELEPHONE ENCOUNTER
Phone Number Called: [phone]    Call outcome: Spoke to patient regarding message below.    Message: Called to inform patient that message has been sent to pharmacy for patient assistance. Patient also reports he has received his samples. No further questions at this time.

## 2023-10-26 NOTE — TELEPHONE ENCOUNTER
AM    Caller: Kip Harper     Topic/issue: Pt is returning phone call in regards to message above please advise.    Callback Number: [phone] (home)      Thank you,     Evaristo PD

## 2023-10-26 NOTE — TELEPHONE ENCOUNTER
AM    Caller: Kip Harper    Topic/issue: Patient returning Ninoska's call.   See Telephone encounter from 10/25/23.  Advised of prescription of samples sent to [facility] pharmacy per Ninoska. Patient requesting return call regarding financial assistance.     Callback Number: [phone] (cell)     Thank you,  Malinda RANKIN

## 2023-11-14 ENCOUNTER — TELEPHONE (OUTPATIENT)
Dept: CARDIOLOGY | Facility: MEDICAL CENTER | Age: 73
End: 2023-11-14
Payer: MEDICARE

## 2023-11-14 DIAGNOSIS — Z79.01 CHRONIC ANTICOAGULATION: ICD-10-CM

## 2023-11-14 NOTE — TELEPHONE ENCOUNTER
Caller: Kip Harper     Topic/issue: Patient returning Nino's Call     Callback Number: [phone]    Thank You   Celia OLIVAREZ

## 2023-11-14 NOTE — TELEPHONE ENCOUNTER
Caller: Dilshad Shin    Topic/issue: Kip is returning your call.     Callback Number: [phone]    Thank you,   Zoe MARC

## 2023-11-14 NOTE — TELEPHONE ENCOUNTER
Phone Number Called: [phone]     Call outcome: Did not leave a detailed message. Requested patient to call back.    Message: Called to inform patient about medications per pharmacy and to see if patient wanted to continue with current medications or switch to a more affordable option. Unable to reach. Left VM for patient to call back when able.       ----- Message from Malinda Alcaraz LIANNA sent at 11/9/2023  4:49 PM PST -----  Regarding: RE: Eliquis PAP Denial  No limit, however our pharmacy currently does not have any samples available.  ----- Message -----  From: Nguyễn Delong  Sent: 11/9/2023   3:29 PM PST  To: Ninoska Robbins R.N.; Malinda Alcaraz LIANNA  Subject: RE: Eliquis PAP Denial                           They might need a new script for samples next month to hold them over to the new year I am not aware if there is a limit for patients on sample fills  @Malinda is there an established limit for Cardio patients?  ----- Message -----  From: Ninoska Robbins R.N.  Sent: 11/9/2023   3:22 PM PST  To: Nguyễn Delong  Subject: RE: Eliquis PAP Denial                           Olga Nguyễn,     Thank you! Is there anything else I need to do for this patient?  ----- Message -----  From: Nguyễn Delong  Sent: 11/9/2023   3:17 PM PST  To: Ninoska Robbins R.N.; #  Subject: Eliquis PAP Denial                               Good Afternoon,  Kip was denied ELIQUIS Patient assistance:  Reason  PT  needs to have spent another $826.40 to qualify   ( OOP out of pocket expense per plan must be 3% of annual income)    I have made patient aware, he expressed understanding.      Thank you  Nguyễn

## 2023-11-16 NOTE — TELEPHONE ENCOUNTER
Phone Number Called: [phone]    Call outcome: Spoke to patient regarding message below.    Message: Called to inform patient of pharmacy technicians notes below. Patient reports he will try the free sample again.     RN called [facility]. Samples are available per pharmacy technician. RN to reach out to [facility] as careteam as AM is OOO to refill Eliquis.

## 2023-11-16 NOTE — TELEPHONE ENCOUNTER
Phone Number Called:  [phone]     Call outcome: Did not leave a detailed message. Requested patient to call back.    Message: Called to return patient's phone call. Unable to reach. Left VM for patient to call back when able.

## 2023-11-16 NOTE — TELEPHONE ENCOUNTER
AM    Caller: Kip Harper     Topic/issue: Pt is returning call    Callback Number: [phone] ([place])      Thank You  Nuria STEELE

## 2023-11-17 PROCEDURE — RXMED WILLOW AMBULATORY MEDICATION CHARGE: Performed by: NURSE PRACTITIONER

## 2023-11-20 ENCOUNTER — PHARMACY VISIT (OUTPATIENT)
Dept: PHARMACY | Facility: MEDICAL CENTER | Age: 73
End: 2023-11-20
Payer: COMMERCIAL

## 2023-11-27 NOTE — TELEPHONE ENCOUNTER
CR      Received request via: Patient    Was the patient seen in the last year in this department? Yes    Does the patient have an active prescription (recently filled or refills available) for medication(s) requested? Yes   Dr Olivo notified for    Positive orthostatic hypotension. Patient reported feeling better than this morning. Denies dizziness.     MAGI stockings on.       Response:  No new orders  Repeat orthostatic in the AM

## 2023-12-21 ENCOUNTER — TELEPHONE (OUTPATIENT)
Dept: CARDIOLOGY | Facility: MEDICAL CENTER | Age: 73
End: 2023-12-21
Payer: MEDICARE

## 2023-12-21 DIAGNOSIS — Z79.01 CHRONIC ANTICOAGULATION: ICD-10-CM

## 2023-12-21 NOTE — TELEPHONE ENCOUNTER
Phone number called: [phone] (home)       Call outcome: Spoke with pt about refill for x1 month sample of Eliquis. Pt aware to  medication at [facility]. Pt have f/v with JI on 1/17/24      To PACO: Ok to send sample of Eliquis to [facility] as AM is OOO.

## 2023-12-21 NOTE — TELEPHONE ENCOUNTER
AM    Caller: Kip Harper     Medication Name and Dosage: apixaban (ELIQUIS) 5mg      Medication amount left: 5 days    Preferred Pharmacy: [facility].     Other questions (Topic): Pt would like samples for this medication, pt also called in regards to previous enc on the 11- on getting the medication sent to the [facility] pharmacy he would like a call back to discuss further.     Callback Number (Will only call for issues): [phone] (home)    Thank you,     Evaristo HALL

## 2023-12-22 ENCOUNTER — PHARMACY VISIT (OUTPATIENT)
Dept: PHARMACY | Facility: MEDICAL CENTER | Age: 73
End: 2023-12-22
Payer: COMMERCIAL

## 2023-12-22 PROCEDURE — RXMED WILLOW AMBULATORY MEDICATION CHARGE: Performed by: NURSE PRACTITIONER

## 2024-01-17 ENCOUNTER — APPOINTMENT (OUTPATIENT)
Dept: CARDIOLOGY | Facility: MEDICAL CENTER | Age: 74
End: 2024-01-17
Attending: INTERNAL MEDICINE
Payer: MEDICARE

## 2024-01-22 ENCOUNTER — TELEPHONE (OUTPATIENT)
Dept: CARDIOLOGY | Facility: MEDICAL CENTER | Age: 74
End: 2024-01-22
Payer: MEDICARE

## 2024-01-22 ENCOUNTER — PHARMACY VISIT (OUTPATIENT)
Dept: PHARMACY | Facility: MEDICAL CENTER | Age: 74
End: 2024-01-22
Payer: COMMERCIAL

## 2024-01-22 DIAGNOSIS — Z79.01 CHRONIC ANTICOAGULATION: ICD-10-CM

## 2024-01-22 PROCEDURE — RXMED WILLOW AMBULATORY MEDICATION CHARGE: Performed by: PHYSICIAN ASSISTANT

## 2024-01-22 NOTE — TELEPHONE ENCOUNTER
AM    Caller: Kip Harper    Topic/issue: MEDICATION MANAGEMENT    Kip states that he will be out of his ELIQUIS all week until he meets with TW. He would like to know if he can get a short supply for this medication sent over to the [facility] pharmacy for samples. Please advise.    Thank you,  Lele WATTS    Callback Number: [phone] (home)

## 2024-01-23 ENCOUNTER — TELEPHONE (OUTPATIENT)
Dept: CARDIOLOGY | Facility: MEDICAL CENTER | Age: 74
End: 2024-01-23
Payer: MEDICARE

## 2024-01-23 DIAGNOSIS — Z79.01 CHRONIC ANTICOAGULATION: ICD-10-CM

## 2024-01-23 NOTE — TELEPHONE ENCOUNTER
I spoke with pt about his medication for Eliquis. Pt wants prescription to be sent to [facility] and because [place], script must be printed, signed and faxed to [phone]. Pt is already scheduled to see TW on 01/26/24. Please have TW sign physical RX and fax to number above.

## 2024-01-23 NOTE — TELEPHONE ENCOUNTER
AM  Caller: Kip Harper     Topic/issue: Patient had some questions regarding his Eliquis prescription. Patient has spoken with RN Nino and would like a call back from her as soon as possible.    Callback Number: [phone]      Thank you,  Selma HOWARD

## 2024-01-26 ENCOUNTER — OFFICE VISIT (OUTPATIENT)
Dept: CARDIOLOGY | Facility: MEDICAL CENTER | Age: 74
End: 2024-01-26
Attending: INTERNAL MEDICINE
Payer: MEDICARE

## 2024-01-26 VITALS
RESPIRATION RATE: 16 BRPM | HEIGHT: 71 IN | OXYGEN SATURATION: 96 % | WEIGHT: 240 LBS | DIASTOLIC BLOOD PRESSURE: 70 MMHG | SYSTOLIC BLOOD PRESSURE: 138 MMHG | BODY MASS INDEX: 33.6 KG/M2 | HEART RATE: 71 BPM

## 2024-01-26 DIAGNOSIS — Z79.01 CHRONIC ANTICOAGULATION: ICD-10-CM

## 2024-01-26 DIAGNOSIS — I10 ESSENTIAL HYPERTENSION, BENIGN: ICD-10-CM

## 2024-01-26 DIAGNOSIS — I48.21 PERMANENT ATRIAL FIBRILLATION (HCC): ICD-10-CM

## 2024-01-26 PROCEDURE — 3078F DIAST BP <80 MM HG: CPT | Performed by: INTERNAL MEDICINE

## 2024-01-26 PROCEDURE — 99215 OFFICE O/P EST HI 40 MIN: CPT | Performed by: INTERNAL MEDICINE

## 2024-01-26 PROCEDURE — 3075F SYST BP GE 130 - 139MM HG: CPT | Performed by: INTERNAL MEDICINE

## 2024-01-26 PROCEDURE — 99212 OFFICE O/P EST SF 10 MIN: CPT | Performed by: INTERNAL MEDICINE

## 2024-01-26 RX ORDER — ACETAMINOPHEN 500 MG
500-1000 TABLET ORAL EVERY 6 HOURS PRN
COMMUNITY

## 2024-01-26 ASSESSMENT — FIBROSIS 4 INDEX: FIB4 SCORE: 2.35

## 2024-01-26 NOTE — PROGRESS NOTES
Chief Complaint   Patient presents with    Atrial Fibrillation     F/v dx: Chronic atrial fibrillation (HCC)       Subjective     Kip Harper is a 73 y.o. male who presents today for initial meeting in follow-up of chronic oral anticoagulation and permanent atrial fibrillation.  Also has hypertension which is well-controlled.  He is asymptomatic from his atrial fibrillation.  He is tolerating his anticoagulation well aside from price.  He is active and continues to work.    Past Medical History:   Diagnosis Date    Hyperlipidemia     Hypertensive heart disease     Hypertensive heart disease     Obesity     Paroxysmal atrial fibrillation (HCC)     Sleep apnea     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      Past Surgical History:   Procedure Laterality Date    GASTROSCOPY-ENDO  8/11/2016    Procedure: GASTROSCOPY-ENDO;  Surgeon: Jones Moyer M.D.;  Location: ENDOSCOPY [facility];  Service:      Family History   Problem Relation Age of Onset    Heart Disease Mother     Diabetes Mother     Heart Disease Father     Lung Disease Father      Social History     Socioeconomic History    Marital status:      Spouse name: Not on file    Number of children: Not on file    Years of education: Not on file    Highest education level: Not on file   Occupational History    Not on file   Tobacco Use    Smoking status: Never    Smokeless tobacco: Never   Vaping Use    Vaping Use: Never used   Substance and Sexual Activity    Alcohol use: No    Drug use: No    Sexual activity: Not on file   Other Topics Concern    Not on file   Social History Narrative    Not on file     Social Determinants of Health     Financial Resource Strain: Not on file   Food Insecurity: Not on file   Transportation Needs: Not on file   Physical Activity: Not on file   Stress: Not on file   Social Connections: Not on file   Intimate Partner Violence: Not on file   Housing Stability: Not on file     Allergies   Allergen Reactions    No Known  "Drug Allergy      Outpatient Encounter Medications as of 1/26/2024   Medication Sig Dispense Refill    acetaminophen (TYLENOL) 500 MG Tab Take 500-1,000 mg by mouth every 6 hours as needed.      apixaban (ELIQUIS) 5mg Tab Take 1 tablet by mouth 2 times a day. 60 Tablet 11    losartan (COZAAR) 100 MG Tab TAKE 1 TABLET BY MOUTH EVERY  Tablet 3    pravastatin (PRAVACHOL) 40 MG tablet Take 1 Tablet by mouth every day. 90 Tablet 3    verapamil ER (CALAN-SR) 240 MG Tab CR Take 1 Tablet by mouth every day. HOLD FOR SBP LESS THAN 100, OR HR LESS THAN 55 90 Tablet 3    allopurinol (ZYLOPRIM) 100 MG Tab Take 1 Tab by mouth every day. 90 Tab 3    [DISCONTINUED] apixaban (ELIQUIS) 5mg Tab Take 1 tablet by mouth 2 times a day. 56 Tablet 0    [DISCONTINUED] apixaban (ELIQUIS) 5mg Tab Take 1 tablet by mouth 2 times a day. 60 Tablet 0     No facility-administered encounter medications on file as of 1/26/2024.     Review of Systems   All other systems reviewed and are negative.             Objective     /70 (BP Location: Left arm, Patient Position: Sitting, BP Cuff Size: Adult)   Pulse 71   Resp 16   Ht 1.803 m (5' 11\")   Wt 109 kg (240 lb)   SpO2 96%   BMI 33.47 kg/m²     Physical Exam  Vitals and nursing note reviewed.   Constitutional:       General: He is not in acute distress.     Appearance: Normal appearance.   HENT:      Head: Normocephalic and atraumatic.      Right Ear: External ear normal.      Left Ear: External ear normal.      Nose: Nose normal.   Eyes:      Conjunctiva/sclera: Conjunctivae normal.   Cardiovascular:      Rate and Rhythm: Normal rate. Rhythm irregular.      Pulses: Normal pulses.      Heart sounds: No murmur heard.  Pulmonary:      Effort: Pulmonary effort is normal. No respiratory distress.      Breath sounds: Normal breath sounds.   Abdominal:      General: There is no distension.      Palpations: Abdomen is soft.   Musculoskeletal:      Cervical back: No rigidity or tenderness.     " " Right lower leg: No edema.      Left lower leg: No edema.   Skin:     General: Skin is warm and dry.      Capillary Refill: Capillary refill takes 2 to 3 seconds.   Neurological:      General: No focal deficit present.      Mental Status: He is alert and oriented to person, place, and time.   Psychiatric:         Mood and Affect: Mood normal.         Behavior: Behavior normal.         Thought Content: Thought content normal.       LABS:  Lab Results   Component Value Date/Time    CHOLSTRLTOT 162 09/13/2023 09:52 AM    LDL 95 09/13/2023 09:52 AM    HDL 51 09/13/2023 09:52 AM    TRIGLYCERIDE 80 09/13/2023 09:52 AM       Lab Results   Component Value Date/Time    WBC 6.7 09/13/2023 09:52 AM    RBC 5.14 09/13/2023 09:52 AM    HEMOGLOBIN 16.3 09/13/2023 09:52 AM    HEMATOCRIT 49.5 09/13/2023 09:52 AM    MCV 96.3 09/13/2023 09:52 AM    NEUTSPOLYS 54.90 09/13/2023 09:52 AM    LYMPHOCYTES 30.70 09/13/2023 09:52 AM    MONOCYTES 11.50 09/13/2023 09:52 AM    EOSINOPHILS 1.90 09/13/2023 09:52 AM    BASOPHILS 0.90 09/13/2023 09:52 AM     Lab Results   Component Value Date/Time    SODIUM 137 09/13/2023 09:52 AM    POTASSIUM 4.2 09/13/2023 09:52 AM    CHLORIDE 101 09/13/2023 09:52 AM    CO2 28 09/13/2023 09:52 AM    GLUCOSE 99 09/13/2023 09:52 AM    BUN 14 09/13/2023 09:52 AM    CREATININE 1.01 09/13/2023 09:52 AM    BUNCREATRAT 20 06/15/2020 05:05 AM     Lab Results   Component Value Date    HBA1C 6.5 (H) 09/13/2023      Lab Results   Component Value Date/Time    ALKPHOSPHAT 54 09/13/2023 09:52 AM    ASTSGOT 28 09/13/2023 09:52 AM    ALTSGPT 17 09/13/2023 09:52 AM    TBILIRUBIN 0.9 09/13/2023 09:52 AM      Lab Results   Component Value Date/Time    BNPBTYPENAT 197.7 (H) 11/02/2017 08:48 AM      No results found for: \"TSH\"  Lab Results   Component Value Date/Time    PROTHROMBTM 15.6 (H) 08/11/2016 02:39 AM    INR 1.25 (H) 08/11/2016 02:39 AM        Imaging reviewed           Assessment & Plan     1. Permanent atrial fibrillation " (HCC)  apixaban (ELIQUIS) 5mg Tab      2. Chronic anticoagulation  apixaban (ELIQUIS) 5mg Tab    Basic Metabolic Panel      3. Essential hypertension, benign            Medical Decision Making: Today's Assessment/Status/Plan:          Tolerating his anticoagulation overall well aside from the price.  This is a high risk medication being monitored with laboratory studies ordered today.  His current dosing is appropriate based on his laboratory studies currently reviewed.  Atrial fibrillation is largely asymptomatic.  With regard to the cost we discussed more cost efficient alternatives including warfarin and its attendant pluses and minuses, we also briefly discussed watchman left atrial appendage occluder and he is very much not interested in any procedural interventions in this regard.  Blood pressure is well-controlled.  We discussed his activity level and functional capacity.  Follow-up routinely.    Mr. Harper's care is highly complex due to high risk diagnosis with either severe exacerbation, progression, or side effects of treatment. We specifically discussed the need for high risk medication requiring at least quarterly testing and/or made a decision on elective/emergent major surgery with identified patient or procedure risk factors specific to Mr. Harper. I have personally spent extra time in discussion about these facts with Mr. Harper and reviewed and or ordered at least 3 tests, documents or other physician/WILDER reports available including labs, imaging and EKGs as appropriate separate from today's encounter.  I have reviewed images with Mr. Harper and personally interpreted on this encounter day the referenced EKG, echocardiogram, stress tests, CT scan, cardiac catheterization or other cardiac imaging and my personal interpretation is what is specifically stated in this note.    t was my pleasure to see Kip Harper today.    Shai Montoya MD, FACC, [facility]  Division of Interventional  Cardiology  [facility]

## 2024-01-31 PROCEDURE — RXMED WILLOW AMBULATORY MEDICATION CHARGE: Performed by: INTERNAL MEDICINE

## 2024-02-01 ENCOUNTER — PHARMACY VISIT (OUTPATIENT)
Dept: PHARMACY | Facility: MEDICAL CENTER | Age: 74
End: 2024-02-01
Payer: COMMERCIAL

## 2024-02-02 DIAGNOSIS — I10 ESSENTIAL HYPERTENSION, BENIGN: ICD-10-CM

## 2024-02-02 DIAGNOSIS — E78.00 PURE HYPERCHOLESTEROLEMIA: ICD-10-CM

## 2024-02-05 RX ORDER — PRAVASTATIN SODIUM 40 MG
40 TABLET ORAL DAILY
Qty: 100 TABLET | Refills: 2 | Status: SHIPPED | OUTPATIENT
Start: 2024-02-05

## 2024-02-05 RX ORDER — VERAPAMIL HYDROCHLORIDE 240 MG/1
240 TABLET, FILM COATED, EXTENDED RELEASE ORAL DAILY
Qty: 100 TABLET | Refills: 2 | Status: SHIPPED | OUTPATIENT
Start: 2024-02-05

## 2024-02-05 NOTE — TELEPHONE ENCOUNTER
Is the patient due for a refill? Yes    Was the patient seen the past year? YES   Date of last office visit: 01/26/2024    Does the patient have an upcoming appointment?  Yes   If yes, When? 08/15/2024    Provider to refill:TAMAR    Does the patients insurance require a 100 day supply?  Yes

## 2024-05-06 PROCEDURE — RXMED WILLOW AMBULATORY MEDICATION CHARGE: Performed by: INTERNAL MEDICINE

## 2024-05-07 ENCOUNTER — TELEPHONE (OUTPATIENT)
Dept: HEALTH INFORMATION MANAGEMENT | Facility: OTHER | Age: 74
End: 2024-05-07

## 2024-05-10 ENCOUNTER — PHARMACY VISIT (OUTPATIENT)
Dept: PHARMACY | Facility: MEDICAL CENTER | Age: 74
End: 2024-05-10
Payer: COMMERCIAL

## 2024-07-15 ENCOUNTER — PHARMACY VISIT (OUTPATIENT)
Dept: PHARMACY | Facility: MEDICAL CENTER | Age: 74
End: 2024-07-15
Payer: COMMERCIAL

## 2024-07-15 PROCEDURE — RXMED WILLOW AMBULATORY MEDICATION CHARGE: Performed by: INTERNAL MEDICINE

## 2024-08-05 ENCOUNTER — TELEPHONE (OUTPATIENT)
Dept: CARDIOLOGY | Facility: MEDICAL CENTER | Age: 74
End: 2024-08-05
Payer: MEDICARE

## 2024-08-05 NOTE — TELEPHONE ENCOUNTER
Attempted to call patient in regards to an upcoming appointment with **. LVM letting patient know to call us back. Attempting to verify where lab work is done. Patient does have  lab work to do.      Pending call back.     TW 8/20/24

## 2024-08-08 ENCOUNTER — HOSPITAL ENCOUNTER (OUTPATIENT)
Dept: LAB | Facility: MEDICAL CENTER | Age: 74
End: 2024-08-08
Attending: FAMILY MEDICINE
Payer: MEDICARE

## 2024-08-08 ENCOUNTER — HOSPITAL ENCOUNTER (OUTPATIENT)
Dept: LAB | Facility: MEDICAL CENTER | Age: 74
End: 2024-08-08
Attending: INTERNAL MEDICINE
Payer: MEDICARE

## 2024-08-08 DIAGNOSIS — I48.21 PERMANENT ATRIAL FIBRILLATION (HCC): ICD-10-CM

## 2024-08-08 DIAGNOSIS — Z79.01 CHRONIC ANTICOAGULATION: ICD-10-CM

## 2024-08-08 LAB
ALBUMIN SERPL BCP-MCNC: 3.8 G/DL (ref 3.2–4.9)
ALBUMIN/GLOB SERPL: 1 G/DL
ALP SERPL-CCNC: 57 U/L (ref 30–99)
ALT SERPL-CCNC: 11 U/L (ref 2–50)
ANION GAP SERPL CALC-SCNC: 11 MMOL/L (ref 7–16)
AST SERPL-CCNC: 23 U/L (ref 12–45)
BILIRUB SERPL-MCNC: 0.6 MG/DL (ref 0.1–1.5)
BUN SERPL-MCNC: 16 MG/DL (ref 8–22)
CALCIUM ALBUM COR SERPL-MCNC: 9.6 MG/DL (ref 8.5–10.5)
CALCIUM SERPL-MCNC: 9.4 MG/DL (ref 8.5–10.5)
CHLORIDE SERPL-SCNC: 104 MMOL/L (ref 96–112)
CO2 SERPL-SCNC: 21 MMOL/L (ref 20–33)
CREAT SERPL-MCNC: 0.99 MG/DL (ref 0.5–1.4)
EST. AVERAGE GLUCOSE BLD GHB EST-MCNC: 134 MG/DL
GFR SERPLBLD CREATININE-BSD FMLA CKD-EPI: 79 ML/MIN/1.73 M 2
GLOBULIN SER CALC-MCNC: 3.8 G/DL (ref 1.9–3.5)
GLUCOSE SERPL-MCNC: 94 MG/DL (ref 65–99)
HBA1C MFR BLD: 6.3 % (ref 4–5.6)
POTASSIUM SERPL-SCNC: 4.3 MMOL/L (ref 3.6–5.5)
PROT SERPL-MCNC: 7.6 G/DL (ref 6–8.2)
SODIUM SERPL-SCNC: 136 MMOL/L (ref 135–145)

## 2024-08-08 PROCEDURE — 82043 UR ALBUMIN QUANTITATIVE: CPT

## 2024-08-08 PROCEDURE — 83036 HEMOGLOBIN GLYCOSYLATED A1C: CPT

## 2024-08-08 PROCEDURE — 82570 ASSAY OF URINE CREATININE: CPT

## 2024-08-08 PROCEDURE — 80053 COMPREHEN METABOLIC PANEL: CPT

## 2024-08-08 PROCEDURE — 36415 COLL VENOUS BLD VENIPUNCTURE: CPT

## 2024-08-08 NOTE — TELEPHONE ENCOUNTER
Received request via: Patient    Was the patient seen in the last year in this department? Yes    Does the patient have an active prescription (recently filled or refills available) for medication(s) requested? No    Pharmacy Name: [facility]     Does the patient have [facility] Plus and need 100-day supply? (This applies to ALL medications) Yes, quantity updated to 100 days

## 2024-08-08 NOTE — TELEPHONE ENCOUNTER
TAMAR      Caller: Kip Harper     Topic/issue: Returning Rita's call, he will be going to the [facility] to complete his labs     Callback Number: [phone]    Thank You   Celia OLIVAREZ

## 2024-08-09 LAB
CREAT UR-MCNC: 74.45 MG/DL
MICROALBUMIN UR-MCNC: 3.6 MG/DL
MICROALBUMIN/CREAT UR: 48 MG/G (ref 0–30)

## 2024-08-12 ENCOUNTER — TELEPHONE (OUTPATIENT)
Dept: CARDIOLOGY | Facility: MEDICAL CENTER | Age: 74
End: 2024-08-12
Payer: MEDICARE

## 2024-08-12 ENCOUNTER — PHARMACY VISIT (OUTPATIENT)
Dept: PHARMACY | Facility: MEDICAL CENTER | Age: 74
End: 2024-08-12
Payer: COMMERCIAL

## 2024-08-12 DIAGNOSIS — I48.21 PERMANENT ATRIAL FIBRILLATION (HCC): ICD-10-CM

## 2024-08-12 DIAGNOSIS — Z79.01 CHRONIC ANTICOAGULATION: ICD-10-CM

## 2024-08-12 PROCEDURE — RXMED WILLOW AMBULATORY MEDICATION CHARGE: Performed by: INTERNAL MEDICINE

## 2024-08-12 NOTE — TELEPHONE ENCOUNTER
TW    Received request via: Patient    Was the patient seen in the last year in this department? Yes    Does the patient have an active prescription (recently filled or refills available) for medication(s) requested?  YES    Pharmacy Name:   [facility]    Does the patient have assisted Plus and need 100-day supply? (This applies to ALL medications) Yes, quantity updated to 100 days

## 2024-08-20 ENCOUNTER — OFFICE VISIT (OUTPATIENT)
Dept: CARDIOLOGY | Facility: MEDICAL CENTER | Age: 74
End: 2024-08-20
Attending: INTERNAL MEDICINE
Payer: MEDICARE

## 2024-08-20 ENCOUNTER — PHARMACY VISIT (OUTPATIENT)
Dept: PHARMACY | Facility: MEDICAL CENTER | Age: 74
End: 2024-08-20
Payer: COMMERCIAL

## 2024-08-20 VITALS
HEIGHT: 71 IN | HEART RATE: 64 BPM | RESPIRATION RATE: 16 BRPM | DIASTOLIC BLOOD PRESSURE: 80 MMHG | BODY MASS INDEX: 31.5 KG/M2 | WEIGHT: 225 LBS | OXYGEN SATURATION: 98 % | SYSTOLIC BLOOD PRESSURE: 128 MMHG

## 2024-08-20 DIAGNOSIS — E78.00 PURE HYPERCHOLESTEROLEMIA: ICD-10-CM

## 2024-08-20 DIAGNOSIS — I48.21 PERMANENT ATRIAL FIBRILLATION (HCC): ICD-10-CM

## 2024-08-20 DIAGNOSIS — Z79.01 CHRONIC ANTICOAGULATION: ICD-10-CM

## 2024-08-20 DIAGNOSIS — N52.9 ERECTILE DYSFUNCTION, UNSPECIFIED ERECTILE DYSFUNCTION TYPE: ICD-10-CM

## 2024-08-20 DIAGNOSIS — I10 ESSENTIAL HYPERTENSION, BENIGN: ICD-10-CM

## 2024-08-20 PROCEDURE — 3074F SYST BP LT 130 MM HG: CPT | Performed by: INTERNAL MEDICINE

## 2024-08-20 PROCEDURE — 99212 OFFICE O/P EST SF 10 MIN: CPT | Performed by: INTERNAL MEDICINE

## 2024-08-20 PROCEDURE — RXMED WILLOW AMBULATORY MEDICATION CHARGE: Performed by: INTERNAL MEDICINE

## 2024-08-20 PROCEDURE — G2211 COMPLEX E/M VISIT ADD ON: HCPCS | Performed by: INTERNAL MEDICINE

## 2024-08-20 PROCEDURE — 99215 OFFICE O/P EST HI 40 MIN: CPT | Performed by: INTERNAL MEDICINE

## 2024-08-20 PROCEDURE — 3079F DIAST BP 80-89 MM HG: CPT | Performed by: INTERNAL MEDICINE

## 2024-08-20 RX ORDER — SILDENAFIL 50 MG/1
50 TABLET, FILM COATED ORAL
Qty: 10 TABLET | Refills: 3 | Status: SHIPPED | OUTPATIENT
Start: 2024-08-20

## 2024-08-20 ASSESSMENT — FIBROSIS 4 INDEX: FIB4 SCORE: 2.43

## 2024-08-20 NOTE — PROGRESS NOTES
Chief Complaint   Patient presents with    Atrial Fibrillation     Follow up       Subjective     Kip Harper is a 74 y.o. male who presents today for initial follow-up of chronic oral anticoagulation and permanent atrial fibrillation.  Also has hypertension which is well-controlled.    In the interim he remains asymptomatic tolerating oral anticoagulation well which is appropriately dosed to bring to his laboratory studies.  Mentions erectile dysfunction and is interested in trying medication if safe.    Past Medical History:   Diagnosis Date    Hyperlipidemia     Hypertensive heart disease     Hypertensive heart disease     Obesity     Paroxysmal atrial fibrillation (HCC)     Sleep apnea     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      Past Surgical History:   Procedure Laterality Date    GASTROSCOPY-ENDO  8/11/2016    Procedure: GASTROSCOPY-ENDO;  Surgeon: Jones Moyer M.D.;  Location: ENDOSCOPY [facility];  Service:      Family History   Problem Relation Age of Onset    Heart Disease Mother     Diabetes Mother     Heart Disease Father     Lung Disease Father      Social History     Socioeconomic History    Marital status:      Spouse name: Not on file    Number of children: Not on file    Years of education: Not on file    Highest education level: Not on file   Occupational History    Not on file   Tobacco Use    Smoking status: Never    Smokeless tobacco: Never   Vaping Use    Vaping status: Never Used   Substance and Sexual Activity    Alcohol use: No    Drug use: No    Sexual activity: Not on file   Other Topics Concern    Not on file   Social History Narrative    Not on file     Social Determinants of Health     Financial Resource Strain: Not on file   Food Insecurity: Not on file   Transportation Needs: Not on file   Physical Activity: Not on file   Stress: Not on file   Social Connections: Not on file   Intimate Partner Violence: Not on file   Housing Stability: Not on file  "    Allergies   Allergen Reactions    No Known Drug Allergy      Outpatient Encounter Medications as of 8/20/2024   Medication Sig Dispense Refill    diclofenac DR (VOLTAREN) 50 MG Tablet Delayed Response Take 50 mg by mouth as needed.      sildenafil citrate (VIAGRA) 50 MG tablet Take 1 Tablet by mouth 1 time a day as needed for Erectile Dysfunction. 10 Tablet 3    apixaban (ELIQUIS) 5mg Tab Take 1 tablet by mouth 2 times a day. 60 Tablet 11    pravastatin (PRAVACHOL) 40 MG tablet TAKE 1 TABLET BY MOUTH EVERY  Tablet 2    verapamil ER (CALAN SR) 240 MG Tab CR TAKE 1 TABLET BY MOUTH EVERY DAY. HOLD FOR SBP LESS THAN 100, OR HR LESS THAN 55 100 Tablet 2    acetaminophen (TYLENOL) 500 MG Tab Take 500-1,000 mg by mouth every 6 hours as needed.      losartan (COZAAR) 100 MG Tab TAKE 1 TABLET BY MOUTH EVERY  Tablet 3    allopurinol (ZYLOPRIM) 100 MG Tab Take 1 Tab by mouth every day. 90 Tab 3    [DISCONTINUED] apixaban (ELIQUIS) 5mg Tab Take 1 tablet by mouth 2 times a day. 200 Tablet 2    [DISCONTINUED] apixaban (ELIQUIS) 5mg Tab Take 1 tablet by mouth 2 times a day. 60 Tablet 11     No facility-administered encounter medications on file as of 8/20/2024.     Review of Systems   All other systems reviewed and are negative.             Objective     /80 (BP Location: Left arm, Patient Position: Sitting)   Pulse 64   Resp 16   Ht 1.803 m (5' 11\")   Wt 102 kg (225 lb)   SpO2 98%   BMI 31.38 kg/m²     Physical Exam  Vitals and nursing note reviewed.   Constitutional:       General: He is not in acute distress.     Appearance: Normal appearance.   HENT:      Head: Normocephalic and atraumatic.      Right Ear: External ear normal.      Left Ear: External ear normal.      Nose: Nose normal.   Eyes:      Conjunctiva/sclera: Conjunctivae normal.   Cardiovascular:      Rate and Rhythm: Normal rate. Rhythm irregular.      Pulses: Normal pulses.      Heart sounds: No murmur heard.  Pulmonary:      Effort: " Pulmonary effort is normal. No respiratory distress.      Breath sounds: Normal breath sounds.   Abdominal:      General: There is no distension.      Palpations: Abdomen is soft.   Musculoskeletal:      Cervical back: No rigidity or tenderness.      Right lower leg: No edema.      Left lower leg: No edema.   Skin:     General: Skin is warm and dry.      Capillary Refill: Capillary refill takes 2 to 3 seconds.   Neurological:      General: No focal deficit present.      Mental Status: He is alert and oriented to person, place, and time.   Psychiatric:         Mood and Affect: Mood normal.         Behavior: Behavior normal.         Thought Content: Thought content normal.       LABS:  Lab Results   Component Value Date/Time    CHOLSTRLTOT 162 09/13/2023 09:52 AM    LDL 95 09/13/2023 09:52 AM    HDL 51 09/13/2023 09:52 AM    TRIGLYCERIDE 80 09/13/2023 09:52 AM       Lab Results   Component Value Date/Time    WBC 6.7 09/13/2023 09:52 AM    RBC 5.14 09/13/2023 09:52 AM    HEMOGLOBIN 16.3 09/13/2023 09:52 AM    HEMATOCRIT 49.5 09/13/2023 09:52 AM    MCV 96.3 09/13/2023 09:52 AM    NEUTSPOLYS 54.90 09/13/2023 09:52 AM    LYMPHOCYTES 30.70 09/13/2023 09:52 AM    MONOCYTES 11.50 09/13/2023 09:52 AM    EOSINOPHILS 1.90 09/13/2023 09:52 AM    BASOPHILS 0.90 09/13/2023 09:52 AM     Lab Results   Component Value Date/Time    SODIUM 136 08/08/2024 10:31 AM    POTASSIUM 4.3 08/08/2024 10:31 AM    CHLORIDE 104 08/08/2024 10:31 AM    CO2 21 08/08/2024 10:31 AM    GLUCOSE 94 08/08/2024 10:31 AM    BUN 16 08/08/2024 10:31 AM    CREATININE 0.99 08/08/2024 10:31 AM    BUNCREATRAT 20 06/15/2020 05:05 AM     Lab Results   Component Value Date    HBA1C 6.3 (H) 08/08/2024      Lab Results   Component Value Date/Time    ALKPHOSPHAT 57 08/08/2024 10:31 AM    ASTSGOT 23 08/08/2024 10:31 AM    ALTSGPT 11 08/08/2024 10:31 AM    TBILIRUBIN 0.6 08/08/2024 10:31 AM      Lab Results   Component Value Date/Time    BNPBTYPENAT 197.7 (H) 11/02/2017  "08:48 AM      No results found for: \"TSH\"  Lab Results   Component Value Date/Time    PROTHROMBTM 15.6 (H) 08/11/2016 02:39 AM    INR 1.25 (H) 08/11/2016 02:39 AM        Imaging reviewed           Assessment & Plan     1. Permanent atrial fibrillation (HCC)        2. Chronic anticoagulation  Basic Metabolic Panel      3. Essential hypertension, benign        4. Pure hypercholesterolemia        5. Erectile dysfunction, unspecified erectile dysfunction type  sildenafil citrate (VIAGRA) 50 MG tablet          Medical Decision Making: Today's Assessment/Status/Plan:          Permanent asymptomatic atrial fibrillation.  Doing well.  Tolerating oral anticoagulation which is high risk medication being monitored with laboratory studies ordered today.  Medical treatment is appropriate and blood pressure is good.  We discussed his blood pressure management lifestyle and dietary changes to help address this as well.  He mentions his erectile dysfunction is not new but he would like to try medication is safe.  We discussed Viagra erectile dysfunction etiologies and therapeutics.  We discussed side effect profile of Viagra not to use it with concomitant nitrates and to allow EMS to know that he is taking it if he is taking it within 48 hours about their arrival, or within that timeframe of arrival to the hospital for any reason.  I prescribed this for him.  Instruction on how to administer it and what to expect were also discussed at length.  Side effects of priapism and visual disturbances were also discussed.  He will follow-up routinely.    Mr. Harper's care is highly complex due to high risk diagnosis with either severe exacerbation, progression, or side effects of treatment and is at high risk for complication, morbidity, and mortality. We specifically discussed the need for high risk medication requiring at least quarterly testing and/or made a decision on elective/emergent major surgery with identified patient or procedure " risk factors specific to Mr. Harper. I have personally spent extra time in discussion about these facts with Mr. Harper and reviewed and or ordered at least 3 tests, documents or other physician/WILDER reports available including labs, imaging and EKGs as appropriate separate from today's encounter.  When relevant, I have reviewed images with Mr. Harper and personally interpreted on this encounter day the referenced EKG, echocardiogram, stress tests, CT scan, cardiac catheterization or other cardiac imaging and my personal interpretation is what is specifically stated in this note.    () Today's E/M visit is associated with medical care services that serve as the continuing focal point for all needed health care services and/or with medical care services that  are part of ongoing care related to a patient's single, serious condition, or a complex condition: This includes  furnishing services to patients on an ongoing basis that result in care that is personalized  to the patient. The services result in a comprehensive, longitudinal, and continuous  relationship with the patient and involve delivery of team-based care that is accessible, coordinated with other practitioners and providers, and integrated with the broader health  care landscape.

## 2024-09-17 DIAGNOSIS — E78.00 PURE HYPERCHOLESTEROLEMIA: ICD-10-CM

## 2024-09-17 RX ORDER — PRAVASTATIN SODIUM 40 MG
40 TABLET ORAL DAILY
Qty: 100 TABLET | Refills: 2 | OUTPATIENT
Start: 2024-09-17

## 2024-11-08 PROCEDURE — RXMED WILLOW AMBULATORY MEDICATION CHARGE: Performed by: INTERNAL MEDICINE

## 2024-11-11 ENCOUNTER — TELEPHONE (OUTPATIENT)
Dept: CARDIOLOGY | Facility: MEDICAL CENTER | Age: 74
End: 2024-11-11
Payer: MEDICARE

## 2024-11-11 ENCOUNTER — PHARMACY VISIT (OUTPATIENT)
Dept: PHARMACY | Facility: MEDICAL CENTER | Age: 74
End: 2024-11-11
Payer: COMMERCIAL

## 2024-11-11 DIAGNOSIS — I48.21 PERMANENT ATRIAL FIBRILLATION (HCC): ICD-10-CM

## 2024-11-11 DIAGNOSIS — Z79.01 CHRONIC ANTICOAGULATION: ICD-10-CM

## 2024-11-11 NOTE — TELEPHONE ENCOUNTER
TW    Caller: Kip Harper    Topic/issue: Patient called because he recently picked up his prescription for apixaban (ELIQUIS) 5mg Tab but it was only a 1 month supply and he stated he thought he would be getting a 3 month supply with each refill. Patient would like a callback.    Please advise.     Callback Number: [phone]    Thank you,     Megan HO

## 2024-11-12 DIAGNOSIS — I10 ESSENTIAL HYPERTENSION, BENIGN: ICD-10-CM

## 2024-11-12 NOTE — TELEPHONE ENCOUNTER
Is the patient due for a refill? Yes    Was the patient seen the past year? Yes    Date of last office visit: 08.20.2024    Does the patient have an upcoming appointment?  No        Provider to refill:TW    Does the patients insurance require a 100 day supply?  Yes

## 2024-11-26 RX ORDER — LOSARTAN POTASSIUM 100 MG/1
100 TABLET ORAL DAILY
Qty: 100 TABLET | Refills: 3 | Status: SHIPPED | OUTPATIENT
Start: 2024-11-26

## 2024-12-05 DIAGNOSIS — I10 ESSENTIAL HYPERTENSION, BENIGN: ICD-10-CM

## 2024-12-09 DIAGNOSIS — I10 ESSENTIAL HYPERTENSION, BENIGN: ICD-10-CM

## 2024-12-09 RX ORDER — VERAPAMIL HYDROCHLORIDE 240 MG/1
240 TABLET, FILM COATED, EXTENDED RELEASE ORAL DAILY
Qty: 100 TABLET | Refills: 2 | OUTPATIENT
Start: 2024-12-09

## 2024-12-09 RX ORDER — VERAPAMIL HYDROCHLORIDE 240 MG/1
240 TABLET, FILM COATED, EXTENDED RELEASE ORAL DAILY
Qty: 100 TABLET | Refills: 2 | Status: SHIPPED | OUTPATIENT
Start: 2024-12-09

## 2024-12-09 NOTE — TELEPHONE ENCOUNTER
Is the patient due for a refill? Yes    Was the patient seen the past year? Yes    Date of last office visit: 08.20.2024    Does the patient have an upcoming appointment?  No      Provider to refill: TW    Does the patient have [facility] Plus and need 100-day supply? (This applies to ALL medications) Yes, quantity updated to 100 days

## 2024-12-12 DIAGNOSIS — E78.00 PURE HYPERCHOLESTEROLEMIA: ICD-10-CM

## 2024-12-12 PROCEDURE — RXMED WILLOW AMBULATORY MEDICATION CHARGE: Performed by: INTERNAL MEDICINE

## 2024-12-12 NOTE — TELEPHONE ENCOUNTER
Is the patient due for a refill? Yes    Was the patient seen the past year? Yes    Date of last office visit: 08.20.2024    Does the patient have an upcoming appointment?  No    Provider to refill:TW    Does the patient have [facility] Plus and need 100-day supply? (This applies to ALL medications) Yes, quantity updated to 100 days

## 2024-12-13 ENCOUNTER — PHARMACY VISIT (OUTPATIENT)
Dept: PHARMACY | Facility: MEDICAL CENTER | Age: 74
End: 2024-12-13
Payer: COMMERCIAL

## 2024-12-13 RX ORDER — PRAVASTATIN SODIUM 40 MG
40 TABLET ORAL DAILY
Qty: 100 TABLET | Refills: 2 | Status: SHIPPED | OUTPATIENT
Start: 2024-12-13

## 2025-01-11 PROCEDURE — RXMED WILLOW AMBULATORY MEDICATION CHARGE: Performed by: INTERNAL MEDICINE

## 2025-01-14 ENCOUNTER — PHARMACY VISIT (OUTPATIENT)
Dept: PHARMACY | Facility: MEDICAL CENTER | Age: 75
End: 2025-01-14
Payer: COMMERCIAL

## 2025-01-24 ENCOUNTER — HOSPITAL ENCOUNTER (OUTPATIENT)
Dept: LAB | Facility: MEDICAL CENTER | Age: 75
End: 2025-01-24
Attending: FAMILY MEDICINE
Payer: MEDICARE

## 2025-01-24 LAB
ALBUMIN SERPL BCP-MCNC: 4.1 G/DL (ref 3.2–4.9)
ALBUMIN/GLOB SERPL: 1.2 G/DL
ALP SERPL-CCNC: 51 U/L (ref 30–99)
ALT SERPL-CCNC: 13 U/L (ref 2–50)
ANION GAP SERPL CALC-SCNC: 12 MMOL/L (ref 7–16)
AST SERPL-CCNC: 28 U/L (ref 12–45)
BASOPHILS # BLD AUTO: 0.7 % (ref 0–1.8)
BASOPHILS # BLD: 0.04 K/UL (ref 0–0.12)
BILIRUB SERPL-MCNC: 0.6 MG/DL (ref 0.1–1.5)
BUN SERPL-MCNC: 14 MG/DL (ref 8–22)
CALCIUM ALBUM COR SERPL-MCNC: 9.3 MG/DL (ref 8.5–10.5)
CALCIUM SERPL-MCNC: 9.4 MG/DL (ref 8.5–10.5)
CHLORIDE SERPL-SCNC: 101 MMOL/L (ref 96–112)
CHOLEST SERPL-MCNC: 175 MG/DL (ref 100–199)
CO2 SERPL-SCNC: 22 MMOL/L (ref 20–33)
CREAT SERPL-MCNC: 0.95 MG/DL (ref 0.5–1.4)
EOSINOPHIL # BLD AUTO: 0.08 K/UL (ref 0–0.51)
EOSINOPHIL NFR BLD: 1.4 % (ref 0–6.9)
ERYTHROCYTE [DISTWIDTH] IN BLOOD BY AUTOMATED COUNT: 47.9 FL (ref 35.9–50)
EST. AVERAGE GLUCOSE BLD GHB EST-MCNC: 134 MG/DL
GFR SERPLBLD CREATININE-BSD FMLA CKD-EPI: 83 ML/MIN/1.73 M 2
GLOBULIN SER CALC-MCNC: 3.4 G/DL (ref 1.9–3.5)
GLUCOSE SERPL-MCNC: 90 MG/DL (ref 65–99)
HBA1C MFR BLD: 6.3 % (ref 4–5.6)
HCT VFR BLD AUTO: 47.5 % (ref 42–52)
HDLC SERPL-MCNC: 67 MG/DL
HGB BLD-MCNC: 15.5 G/DL (ref 14–18)
IMM GRANULOCYTES # BLD AUTO: 0.03 K/UL (ref 0–0.11)
IMM GRANULOCYTES NFR BLD AUTO: 0.5 % (ref 0–0.9)
LDLC SERPL CALC-MCNC: 91 MG/DL
LYMPHOCYTES # BLD AUTO: 1.63 K/UL (ref 1–4.8)
LYMPHOCYTES NFR BLD: 29.2 % (ref 22–41)
MCH RBC QN AUTO: 31.3 PG (ref 27–33)
MCHC RBC AUTO-ENTMCNC: 32.6 G/DL (ref 32.3–36.5)
MCV RBC AUTO: 96 FL (ref 81.4–97.8)
MONOCYTES # BLD AUTO: 0.57 K/UL (ref 0–0.85)
MONOCYTES NFR BLD AUTO: 10.2 % (ref 0–13.4)
NEUTROPHILS # BLD AUTO: 3.24 K/UL (ref 1.82–7.42)
NEUTROPHILS NFR BLD: 58 % (ref 44–72)
NRBC # BLD AUTO: 0 K/UL
NRBC BLD-RTO: 0 /100 WBC (ref 0–0.2)
PLATELET # BLD AUTO: 205 K/UL (ref 164–446)
PMV BLD AUTO: 11.1 FL (ref 9–12.9)
POTASSIUM SERPL-SCNC: 4.2 MMOL/L (ref 3.6–5.5)
PROT SERPL-MCNC: 7.5 G/DL (ref 6–8.2)
PSA SERPL DL<=0.01 NG/ML-MCNC: 1.32 NG/ML (ref 0–4)
RBC # BLD AUTO: 4.95 M/UL (ref 4.7–6.1)
SODIUM SERPL-SCNC: 135 MMOL/L (ref 135–145)
TRIGL SERPL-MCNC: 83 MG/DL (ref 0–149)
WBC # BLD AUTO: 5.6 K/UL (ref 4.8–10.8)

## 2025-01-24 PROCEDURE — 80053 COMPREHEN METABOLIC PANEL: CPT

## 2025-01-24 PROCEDURE — 85025 COMPLETE CBC W/AUTO DIFF WBC: CPT

## 2025-01-24 PROCEDURE — 80061 LIPID PANEL: CPT

## 2025-01-24 PROCEDURE — 84153 ASSAY OF PSA TOTAL: CPT

## 2025-01-24 PROCEDURE — 36415 COLL VENOUS BLD VENIPUNCTURE: CPT

## 2025-01-24 PROCEDURE — 83036 HEMOGLOBIN GLYCOSYLATED A1C: CPT

## 2025-04-14 ENCOUNTER — PHARMACY VISIT (OUTPATIENT)
Dept: PHARMACY | Facility: MEDICAL CENTER | Age: 75
End: 2025-04-14
Payer: COMMERCIAL

## 2025-04-14 PROCEDURE — RXMED WILLOW AMBULATORY MEDICATION CHARGE: Performed by: INTERNAL MEDICINE

## 2025-05-02 ENCOUNTER — HOSPITAL ENCOUNTER (OUTPATIENT)
Dept: LAB | Facility: MEDICAL CENTER | Age: 75
End: 2025-05-02
Attending: FAMILY MEDICINE
Payer: MEDICARE

## 2025-05-02 LAB
ALBUMIN SERPL BCP-MCNC: 4.1 G/DL (ref 3.2–4.9)
ALBUMIN/GLOB SERPL: 1.2 G/DL
ALP SERPL-CCNC: 55 U/L (ref 30–99)
ALT SERPL-CCNC: 17 U/L (ref 2–50)
ANION GAP SERPL CALC-SCNC: 9 MMOL/L (ref 7–16)
APPEARANCE UR: CLEAR
AST SERPL-CCNC: 27 U/L (ref 12–45)
BASOPHILS # BLD AUTO: 1 % (ref 0–1.8)
BASOPHILS # BLD: 0.05 K/UL (ref 0–0.12)
BILIRUB SERPL-MCNC: 0.7 MG/DL (ref 0.1–1.5)
BILIRUB UR QL STRIP.AUTO: NEGATIVE
BUN SERPL-MCNC: 21 MG/DL (ref 8–22)
CALCIUM ALBUM COR SERPL-MCNC: 9.2 MG/DL (ref 8.5–10.5)
CALCIUM SERPL-MCNC: 9.3 MG/DL (ref 8.5–10.5)
CHLORIDE SERPL-SCNC: 104 MMOL/L (ref 96–112)
CO2 SERPL-SCNC: 24 MMOL/L (ref 20–33)
COLOR UR: YELLOW
CREAT SERPL-MCNC: 1.03 MG/DL (ref 0.5–1.4)
EOSINOPHIL # BLD AUTO: 0.1 K/UL (ref 0–0.51)
EOSINOPHIL NFR BLD: 2.1 % (ref 0–6.9)
ERYTHROCYTE [DISTWIDTH] IN BLOOD BY AUTOMATED COUNT: 48.2 FL (ref 35.9–50)
GFR SERPLBLD CREATININE-BSD FMLA CKD-EPI: 75 ML/MIN/1.73 M 2
GLOBULIN SER CALC-MCNC: 3.4 G/DL (ref 1.9–3.5)
GLUCOSE SERPL-MCNC: 98 MG/DL (ref 65–99)
GLUCOSE UR STRIP.AUTO-MCNC: NEGATIVE MG/DL
HCT VFR BLD AUTO: 46.4 % (ref 42–52)
HGB BLD-MCNC: 15.3 G/DL (ref 14–18)
IMM GRANULOCYTES # BLD AUTO: 0.02 K/UL (ref 0–0.11)
IMM GRANULOCYTES NFR BLD AUTO: 0.4 % (ref 0–0.9)
KETONES UR STRIP.AUTO-MCNC: NEGATIVE MG/DL
LEUKOCYTE ESTERASE UR QL STRIP.AUTO: NEGATIVE
LYMPHOCYTES # BLD AUTO: 1.5 K/UL (ref 1–4.8)
LYMPHOCYTES NFR BLD: 31.1 % (ref 22–41)
MCH RBC QN AUTO: 31.9 PG (ref 27–33)
MCHC RBC AUTO-ENTMCNC: 33 G/DL (ref 32.3–36.5)
MCV RBC AUTO: 96.9 FL (ref 81.4–97.8)
MICRO URNS: NORMAL
MONOCYTES # BLD AUTO: 0.63 K/UL (ref 0–0.85)
MONOCYTES NFR BLD AUTO: 13 % (ref 0–13.4)
NEUTROPHILS # BLD AUTO: 2.53 K/UL (ref 1.82–7.42)
NEUTROPHILS NFR BLD: 52.4 % (ref 44–72)
NITRITE UR QL STRIP.AUTO: NEGATIVE
NRBC # BLD AUTO: 0 K/UL
NRBC BLD-RTO: 0 /100 WBC (ref 0–0.2)
NT-PROBNP SERPL IA-MCNC: 654 PG/ML (ref 0–125)
PH UR STRIP.AUTO: 6.5 [PH] (ref 5–8)
PLATELET # BLD AUTO: 183 K/UL (ref 164–446)
PMV BLD AUTO: 11 FL (ref 9–12.9)
POTASSIUM SERPL-SCNC: 4.5 MMOL/L (ref 3.6–5.5)
PROT SERPL-MCNC: 7.5 G/DL (ref 6–8.2)
PROT UR QL STRIP: NEGATIVE MG/DL
RBC # BLD AUTO: 4.79 M/UL (ref 4.7–6.1)
RBC UR QL AUTO: NEGATIVE
SODIUM SERPL-SCNC: 137 MMOL/L (ref 135–145)
SP GR UR STRIP.AUTO: 1.02
UROBILINOGEN UR STRIP.AUTO-MCNC: 1 EU/DL
WBC # BLD AUTO: 4.8 K/UL (ref 4.8–10.8)

## 2025-05-02 PROCEDURE — 81003 URINALYSIS AUTO W/O SCOPE: CPT

## 2025-05-02 PROCEDURE — 36415 COLL VENOUS BLD VENIPUNCTURE: CPT

## 2025-05-02 PROCEDURE — 85025 COMPLETE CBC W/AUTO DIFF WBC: CPT

## 2025-05-02 PROCEDURE — 83880 ASSAY OF NATRIURETIC PEPTIDE: CPT

## 2025-05-02 PROCEDURE — 80053 COMPREHEN METABOLIC PANEL: CPT

## 2025-07-15 PROCEDURE — RXMED WILLOW AMBULATORY MEDICATION CHARGE: Performed by: INTERNAL MEDICINE

## 2025-07-16 ENCOUNTER — PHARMACY VISIT (OUTPATIENT)
Dept: PHARMACY | Facility: MEDICAL CENTER | Age: 75
End: 2025-07-16
Payer: COMMERCIAL